# Patient Record
Sex: MALE | Race: WHITE | NOT HISPANIC OR LATINO | Employment: OTHER | URBAN - METROPOLITAN AREA
[De-identification: names, ages, dates, MRNs, and addresses within clinical notes are randomized per-mention and may not be internally consistent; named-entity substitution may affect disease eponyms.]

---

## 2018-07-17 ENCOUNTER — APPOINTMENT (OUTPATIENT)
Dept: RADIOLOGY | Facility: CLINIC | Age: 78
End: 2018-07-17
Payer: COMMERCIAL

## 2018-07-17 ENCOUNTER — OFFICE VISIT (OUTPATIENT)
Dept: URGENT CARE | Facility: CLINIC | Age: 78
End: 2018-07-17
Payer: COMMERCIAL

## 2018-07-17 VITALS
SYSTOLIC BLOOD PRESSURE: 178 MMHG | TEMPERATURE: 97.4 F | WEIGHT: 160 LBS | DIASTOLIC BLOOD PRESSURE: 88 MMHG | RESPIRATION RATE: 22 BRPM | HEART RATE: 84 BPM | OXYGEN SATURATION: 95 % | BODY MASS INDEX: 22.9 KG/M2 | HEIGHT: 70 IN

## 2018-07-17 DIAGNOSIS — S09.93XA FACIAL INJURY, INITIAL ENCOUNTER: ICD-10-CM

## 2018-07-17 DIAGNOSIS — S09.93XA FACIAL INJURY, INITIAL ENCOUNTER: Primary | ICD-10-CM

## 2018-07-17 PROCEDURE — 99203 OFFICE O/P NEW LOW 30 MIN: CPT

## 2018-07-17 PROCEDURE — 70160 X-RAY EXAM OF NASAL BONES: CPT

## 2018-07-17 PROCEDURE — S9088 SERVICES PROVIDED IN URGENT: HCPCS

## 2018-07-17 RX ORDER — PROPRANOLOL HYDROCHLORIDE 10 MG/1
10 TABLET ORAL 3 TIMES DAILY
COMMUNITY

## 2018-07-17 RX ORDER — DONEPEZIL HYDROCHLORIDE 10 MG/1
10 TABLET, FILM COATED ORAL
COMMUNITY

## 2018-07-17 RX ORDER — MULTIVITAMIN
1 CAPSULE ORAL DAILY
COMMUNITY
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
## 2021-01-20 ENCOUNTER — APPOINTMENT (EMERGENCY)
Dept: RADIOLOGY | Facility: HOSPITAL | Age: 81
End: 2021-01-20
Payer: COMMERCIAL

## 2021-01-20 ENCOUNTER — HOSPITAL ENCOUNTER (EMERGENCY)
Facility: HOSPITAL | Age: 81
Discharge: HOME | End: 2021-01-20
Attending: EMERGENCY MEDICINE | Admitting: EMERGENCY MEDICINE
Payer: COMMERCIAL

## 2021-01-20 VITALS
TEMPERATURE: 97.9 F | HEIGHT: 70 IN | WEIGHT: 169 LBS | BODY MASS INDEX: 24.2 KG/M2 | HEART RATE: 71 BPM | DIASTOLIC BLOOD PRESSURE: 75 MMHG | RESPIRATION RATE: 20 BRPM | OXYGEN SATURATION: 98 % | SYSTOLIC BLOOD PRESSURE: 137 MMHG

## 2021-01-20 DIAGNOSIS — R07.9 CHEST PAIN: Primary | ICD-10-CM

## 2021-01-20 DIAGNOSIS — J18.9 PNEUMONIA: ICD-10-CM

## 2021-01-20 DIAGNOSIS — R93.89 ABNORMAL CHEST X-RAY: ICD-10-CM

## 2021-01-20 LAB
ALBUMIN SERPL BCP-MCNC: 3.8 G/DL (ref 3.5–5)
ALP SERPL-CCNC: 105 U/L (ref 46–116)
ALT SERPL W P-5'-P-CCNC: 31 U/L (ref 12–78)
ANION GAP SERPL CALCULATED.3IONS-SCNC: 9 MMOL/L (ref 4–13)
AST SERPL W P-5'-P-CCNC: 22 U/L (ref 5–45)
BASOPHILS # BLD AUTO: 0.05 THOUSANDS/ΜL (ref 0–0.1)
BASOPHILS NFR BLD AUTO: 1 % (ref 0–1)
BILIRUB SERPL-MCNC: 0.72 MG/DL (ref 0.2–1)
BUN SERPL-MCNC: 17 MG/DL (ref 5–25)
CALCIUM SERPL-MCNC: 8.6 MG/DL (ref 8.3–10.1)
CHLORIDE SERPL-SCNC: 107 MMOL/L (ref 100–108)
CK SERPL-CCNC: 100 U/L (ref 39–308)
CO2 SERPL-SCNC: 26 MMOL/L (ref 21–32)
CREAT SERPL-MCNC: 1.04 MG/DL (ref 0.6–1.3)
EOSINOPHIL # BLD AUTO: 0.13 THOUSAND/ΜL (ref 0–0.61)
EOSINOPHIL NFR BLD AUTO: 2 % (ref 0–6)
ERYTHROCYTE [DISTWIDTH] IN BLOOD BY AUTOMATED COUNT: 13.1 % (ref 11.6–15.1)
FLUAV RNA RESP QL NAA+PROBE: NEGATIVE
FLUBV RNA RESP QL NAA+PROBE: NEGATIVE
GFR SERPL CREATININE-BSD FRML MDRD: 68 ML/MIN/1.73SQ M
GLUCOSE SERPL-MCNC: 124 MG/DL (ref 65–140)
HCT VFR BLD AUTO: 48.6 % (ref 36.5–49.3)
HGB BLD-MCNC: 15.9 G/DL (ref 12–17)
IMM GRANULOCYTES # BLD AUTO: 0.03 THOUSAND/UL (ref 0–0.2)
IMM GRANULOCYTES NFR BLD AUTO: 0 % (ref 0–2)
INR PPP: 1.06 (ref 0.84–1.19)
LIPASE SERPL-CCNC: 221 U/L (ref 73–393)
LYMPHOCYTES # BLD AUTO: 1.6 THOUSANDS/ΜL (ref 0.6–4.47)
LYMPHOCYTES NFR BLD AUTO: 19 % (ref 14–44)
MAGNESIUM SERPL-MCNC: 2 MG/DL (ref 1.6–2.6)
MCH RBC QN AUTO: 31.4 PG (ref 26.8–34.3)
MCHC RBC AUTO-ENTMCNC: 32.7 G/DL (ref 31.4–37.4)
MCV RBC AUTO: 96 FL (ref 82–98)
MONOCYTES # BLD AUTO: 0.52 THOUSAND/ΜL (ref 0.17–1.22)
MONOCYTES NFR BLD AUTO: 6 % (ref 4–12)
NEUTROPHILS # BLD AUTO: 6.09 THOUSANDS/ΜL (ref 1.85–7.62)
NEUTS SEG NFR BLD AUTO: 72 % (ref 43–75)
NRBC BLD AUTO-RTO: 0 /100 WBCS
PLATELET # BLD AUTO: 134 THOUSANDS/UL (ref 149–390)
PMV BLD AUTO: 12.4 FL (ref 8.9–12.7)
POTASSIUM SERPL-SCNC: 4 MMOL/L (ref 3.5–5.3)
PROT SERPL-MCNC: 7.3 G/DL (ref 6.4–8.2)
PROTHROMBIN TIME: 13.6 SECONDS (ref 11.6–14.5)
RBC # BLD AUTO: 5.07 MILLION/UL (ref 3.88–5.62)
RSV RNA RESP QL NAA+PROBE: NEGATIVE
SARS-COV-2 RNA RESP QL NAA+PROBE: NEGATIVE
SODIUM SERPL-SCNC: 142 MMOL/L (ref 136–145)
TROPONIN I SERPL-MCNC: <0.02 NG/ML
TROPONIN I SERPL-MCNC: <0.02 NG/ML
TSH SERPL DL<=0.05 MIU/L-ACNC: 1.93 UIU/ML (ref 0.36–3.74)
WBC # BLD AUTO: 8.42 THOUSAND/UL (ref 4.31–10.16)

## 2021-01-20 PROCEDURE — 80053 COMPREHEN METABOLIC PANEL: CPT | Performed by: EMERGENCY MEDICINE

## 2021-01-20 PROCEDURE — 36415 COLL VENOUS BLD VENIPUNCTURE: CPT | Performed by: EMERGENCY MEDICINE

## 2021-01-20 PROCEDURE — 99285 EMERGENCY DEPT VISIT HI MDM: CPT

## 2021-01-20 PROCEDURE — 0241U HB NFCT DS VIR RESP RNA 4 TRGT: CPT | Performed by: EMERGENCY MEDICINE

## 2021-01-20 PROCEDURE — 83735 ASSAY OF MAGNESIUM: CPT | Performed by: EMERGENCY MEDICINE

## 2021-01-20 PROCEDURE — 71045 X-RAY EXAM CHEST 1 VIEW: CPT

## 2021-01-20 PROCEDURE — 85610 PROTHROMBIN TIME: CPT | Performed by: EMERGENCY MEDICINE

## 2021-01-20 PROCEDURE — 85025 COMPLETE CBC W/AUTO DIFF WBC: CPT | Performed by: EMERGENCY MEDICINE

## 2021-01-20 PROCEDURE — 84443 ASSAY THYROID STIM HORMONE: CPT | Performed by: EMERGENCY MEDICINE

## 2021-01-20 PROCEDURE — 84484 ASSAY OF TROPONIN QUANT: CPT | Performed by: EMERGENCY MEDICINE

## 2021-01-20 PROCEDURE — 83690 ASSAY OF LIPASE: CPT | Performed by: EMERGENCY MEDICINE

## 2021-01-20 PROCEDURE — 96360 HYDRATION IV INFUSION INIT: CPT

## 2021-01-20 PROCEDURE — 82550 ASSAY OF CK (CPK): CPT | Performed by: EMERGENCY MEDICINE

## 2021-01-20 PROCEDURE — 99285 EMERGENCY DEPT VISIT HI MDM: CPT | Performed by: EMERGENCY MEDICINE

## 2021-01-20 RX ORDER — DOXYCYCLINE HYCLATE 100 MG/1
100 CAPSULE ORAL 2 TIMES DAILY
Qty: 14 CAPSULE | Refills: 0 | Status: SHIPPED | OUTPATIENT
Start: 2021-01-20 | End: 2021-01-27

## 2021-01-20 RX ORDER — SODIUM CHLORIDE 9 MG/ML
3 INJECTION INTRAVENOUS
Status: DISCONTINUED | OUTPATIENT
Start: 2021-01-20 | End: 2021-01-20 | Stop reason: HOSPADM

## 2021-01-20 RX ORDER — DOXYCYCLINE HYCLATE 100 MG/1
100 CAPSULE ORAL ONCE
Status: COMPLETED | OUTPATIENT
Start: 2021-01-20 | End: 2021-01-20

## 2021-01-20 RX ADMIN — SODIUM CHLORIDE 500 ML: 0.9 INJECTION, SOLUTION INTRAVENOUS at 10:39

## 2021-01-20 RX ADMIN — DOXYCYCLINE 100 MG: 100 CAPSULE ORAL at 14:21

## 2021-01-20 NOTE — PROGRESS NOTES
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

## 2021-01-20 NOTE — DISCHARGE INSTRUCTIONS
Your chest x-ray shows bilateral infiltrates concerning for COVID-19 pneumonia  Your COVID-19 test, however, was negative today  You will be started on antibiotics prophylactically to cover you for pneumonia  Please discussed return to work with your manager

## 2021-01-20 NOTE — ED PROVIDER NOTES
History  Chief Complaint   Patient presents with    Chest Pain     Pt reports sudden onset of left sided chest pain while sitting at a lunch room table, EMS gave 2 SL nitro and 324mg ASA pts chest has since resolved  77-year-old male with a past medical history of diverticulitis, BPH, hypertension, daily smoker one pack per day presents with left-sided sudden-onset chest pain that started at 8:45 a m  This morning, now resolved  Patient was at work and was sitting down  Denies previous history of chest pain and denies history of myocardial infarction, stents or aortic dissection  Patient denies trauma, fall or exertional activity  Patient arrives by EMS and received aspirin 324 mg p o  And two sublingual nitros with chest pain resolution  Patient denies diaphoresis, fever, chills, headache, neck stiffness, sore throat, cough, sputum production, nausea, vomiting, chest pain, shortness of breath, back pain, rash, abdominal pain, urinary symptoms, penile discharge, scrotal swelling, focal motor or sensory deficits, lower extremity swelling or pain, diarrhea, bloody stools or constipation  Patient has never seen a cardiologist before  No known COVID-19 exposure  Dr Conrad Crawford wore PPE during clinical evaluation due to COVID-19 pandemic including bonnet, eye goggles, face mask, gown and gloves            History provided by:  Patient and medical records   used: No    Chest Pain  Pain location:  L chest  Pain quality: pressure, sharp and tightness    Pain radiates to:  Does not radiate  Pain radiates to the back: no    Pain severity:  No pain  Onset quality:  Sudden  Duration:  10 minutes  Timing:  Constant  Progression:  Resolved  Chronicity:  New  Context: at rest    Context: not breathing, no drug use, not eating, no intercourse, not lifting, no movement, not raising an arm, no stress and no trauma    Relieved by:  Aspirin and nitroglycerin  Worsened by:  Nothing tried  Ineffective treatments:  None tried  Associated symptoms: no abdominal pain, no AICD problem, no altered mental status, no anorexia, no anxiety, no back pain, no claudication, no cough, no diaphoresis, no dizziness, no dysphagia, no fatigue, no fever, no headache, no heartburn, no lower extremity edema, no nausea, no near-syncope, no numbness, no orthopnea, no palpitations, no PND, no shortness of breath, no syncope, not vomiting and no weakness    Risk factors: hypertension, male sex and smoking    Risk factors: no aortic disease, no birth control, no coronary artery disease, no diabetes mellitus, no Nathaniel-Danlos syndrome, no high cholesterol, no immobilization, no Marfan's syndrome, not obese, not pregnant, no prior DVT/PE and no surgery        Prior to Admission Medications   Prescriptions Last Dose Informant Patient Reported? Taking? Misc Natural Products ([facility] PROSTATE HEALTH FORMULA PO)   Yes Yes   Sig: Take by mouth   Multiple Vitamin (MULTIVITAMIN) capsule   Yes Yes   Sig: Take 1 capsule by mouth daily   donepezil (ARICEPT) 10 mg tablet   Yes Yes   Sig: Take 10 mg by mouth daily at bedtime   propranolol (INDERAL) 10 mg tablet   Yes Yes   Sig: Take 10 mg by mouth 3 (three) times a day      Facility-Administered Medications: None       Past Medical History:   Diagnosis Date    BPH (benign prostatic hyperplasia)     Diverticulitis     Hypertension        History reviewed  No pertinent surgical history  History reviewed  No pertinent family history  I have reviewed and agree with the history as documented  E-Cigarette/Vaping     E-Cigarette/Vaping Substances     Social History     Tobacco Use    Smoking status: Current Every Day Smoker     Packs/day: 1 00     Types: Cigarettes    Smokeless tobacco: Never Used   Substance Use Topics    Alcohol use: No    Drug use: No       Review of Systems   Constitutional: Negative for chills, diaphoresis, fatigue and fever     HENT: Negative for congestion, drooling, facial swelling, nosebleeds, sneezing, trouble swallowing and voice change  Eyes: Negative for photophobia, pain and visual disturbance  Respiratory: Negative for cough, chest tightness, shortness of breath and wheezing  Cardiovascular: Positive for chest pain  Negative for palpitations, orthopnea, claudication, leg swelling, syncope, PND and near-syncope  Gastrointestinal: Negative for abdominal pain, anorexia, constipation, diarrhea, heartburn, nausea and vomiting  Genitourinary: Negative for decreased urine volume, difficulty urinating, discharge, dysuria, flank pain, frequency, hematuria, penile pain, penile swelling, scrotal swelling, testicular pain and urgency  Musculoskeletal: Negative for arthralgias, back pain, myalgias, neck pain and neck stiffness  Skin: Negative for color change, pallor, rash and wound  Allergic/Immunologic: Negative for immunocompromised state  Neurological: Negative for dizziness, tremors, seizures, syncope, facial asymmetry, speech difficulty, weakness, light-headedness, numbness and headaches  Hematological: Negative for adenopathy  Psychiatric/Behavioral: Negative for agitation, confusion, hallucinations and suicidal ideas  The patient is not nervous/anxious  Physical Exam  Physical Exam  Vitals signs and nursing note reviewed  Exam conducted with a chaperone present  Constitutional:       General: He is not in acute distress  Appearance: Normal appearance  He is well-developed and normal weight  He is not ill-appearing, toxic-appearing or diaphoretic  HENT:      Head: Normocephalic and atraumatic  Jaw: There is normal jaw occlusion  Right Ear: Hearing, tympanic membrane, ear canal and external ear normal  There is no impacted cerumen  No mastoid tenderness  No hemotympanum  Left Ear: Hearing, tympanic membrane, ear canal and external ear normal  There is no impacted cerumen  No mastoid tenderness  No hemotympanum        Nose: Nose normal       Right Nostril: No epistaxis  Left Nostril: No epistaxis  Right Sinus: No maxillary sinus tenderness or frontal sinus tenderness  Left Sinus: No maxillary sinus tenderness or frontal sinus tenderness  Mouth/Throat:      Lips: Pink  No lesions  Mouth: Mucous membranes are moist  No lacerations or angioedema  Tongue: No lesions  Tongue does not deviate from midline  Palate: No mass and lesions  Pharynx: Oropharynx is clear  Uvula midline  No pharyngeal swelling, oropharyngeal exudate, posterior oropharyngeal erythema or uvula swelling  Tonsils: No tonsillar exudate or tonsillar abscesses  Eyes:      General: Lids are normal  Vision grossly intact  Gaze aligned appropriately  No visual field deficit or scleral icterus  Right eye: No discharge  Left eye: No discharge  Extraocular Movements: Extraocular movements intact  Right eye: No nystagmus  Left eye: No nystagmus  Conjunctiva/sclera: Conjunctivae normal       Right eye: Right conjunctiva is not injected  Left eye: Left conjunctiva is not injected  Pupils: Pupils are equal, round, and reactive to light  Neck:      Musculoskeletal: Full passive range of motion without pain, normal range of motion and neck supple  No neck rigidity, spinous process tenderness or muscular tenderness  Thyroid: No thyroid mass or thyromegaly  Trachea: Trachea and phonation normal    Cardiovascular:      Rate and Rhythm: Normal rate and regular rhythm  Pulses: Normal pulses  Radial pulses are 2+ on the right side and 2+ on the left side  Dorsalis pedis pulses are 2+ on the right side and 2+ on the left side  Heart sounds: Normal heart sounds, S1 normal and S2 normal    Pulmonary:      Effort: Pulmonary effort is normal  No tachypnea, accessory muscle usage, respiratory distress or retractions        Breath sounds: Normal breath sounds and air entry  No stridor or decreased air movement  No decreased breath sounds, wheezing, rhonchi or rales  Chest:      Chest wall: No tenderness  Abdominal:      General: Abdomen is flat  Bowel sounds are normal  There is no distension  Palpations: Abdomen is soft  Abdomen is not rigid  There is no hepatomegaly, splenomegaly or mass  Tenderness: There is no abdominal tenderness  There is no right CVA tenderness, left CVA tenderness, guarding or rebound  Negative signs include Aguirre's sign and McBurney's sign  Hernia: No hernia is present  Genitourinary:     Penis: Normal        Scrotum/Testes: Normal    Musculoskeletal: Normal range of motion  General: No swelling, tenderness, deformity or signs of injury  Right lower leg: He exhibits no tenderness  No edema  Left lower leg: He exhibits no tenderness  No edema  Lymphadenopathy:      Cervical: No cervical adenopathy  Skin:     General: Skin is warm and dry  Capillary Refill: Capillary refill takes less than 2 seconds  Coloration: Skin is not ashen, cyanotic, jaundiced, mottled, pale or sallow  Findings: No abrasion, abscess, acne, bruising, burn, ecchymosis, erythema, signs of injury, laceration, lesion, petechiae, rash or wound  Rash is not macular or papular  Nails: There is no clubbing  Neurological:      General: No focal deficit present  Mental Status: He is alert and oriented to person, place, and time  Mental status is at baseline  He is not disoriented  GCS: GCS eye subscore is 4  GCS verbal subscore is 5  GCS motor subscore is 6  Cranial Nerves: Cranial nerves are intact  No cranial nerve deficit, dysarthria or facial asymmetry  Sensory: Sensation is intact  No sensory deficit  Motor: Motor function is intact  No weakness, tremor, atrophy, abnormal muscle tone or seizure activity  Coordination: Coordination is intact  Coordination normal       Gait: Gait is intact  Gait normal       Comments: Patient is AAOx4, GCS 15; speaking clearly and appropriately; motor and sensation intact; visual fields intact; cranial nerves II-XII grossly intact; no facial droop, slurred speech or arm drift   Psychiatric:         Attention and Perception: Attention and perception normal  He is attentive  Mood and Affect: Mood and affect normal          Speech: Speech normal          Behavior: Behavior normal  Behavior is cooperative  Thought Content:  Thought content normal          Cognition and Memory: Cognition and memory normal          Judgment: Judgment normal          Vital Signs  ED Triage Vitals [01/20/21 1024]   Temperature Pulse Respirations Blood Pressure SpO2   97 9 °F (36 6 °C) 84 20 (!) 180/92 97 %      Temp Source Heart Rate Source Patient Position - Orthostatic VS BP Location FiO2 (%)   Temporal Monitor Lying Left arm --      Pain Score       No Pain           Vitals:    01/20/21 1130 01/20/21 1330 01/20/21 1337 01/20/21 1449   BP: 150/82 159/87 151/81 137/75   Pulse: 75 68 66 71   Patient Position - Orthostatic VS: Lying Sitting Lying Lying         Visual Acuity      ED Medications  Medications   sodium chloride (PF) 0 9 % injection 3 mL (has no administration in time range)   sodium chloride 0 9 % bolus 500 mL (0 mL Intravenous Stopped 1/20/21 1121)   doxycycline hyclate (VIBRAMYCIN) capsule 100 mg (100 mg Oral Given 1/20/21 1421)       Diagnostic Studies  Results Reviewed     Procedure Component Value Units Date/Time    Troponin I repeat in 3hrs [[id number]]  (Normal) Collected: 01/20/21 1330    Lab Status: Final result Specimen: Blood from Arm, Left Updated: 01/20/21 1351     Troponin I <0 02 ng/mL     COVID19, Influenza A/B, RSV PCR, [facility] [[id number]]  (Normal) Collected: 01/20/21 1123    Lab Status: Final result Specimen: Nares from Nasopharyngeal Swab Updated: 01/20/21 1210     SARS-CoV-2 Negative     INFLUENZA A PCR Negative     INFLUENZA B PCR Negative     RSV PCR Negative    Narrative: This test has been authorized by FDA under an EUA (Emergency Use Assay) for use by authorized laboratories  Clinical caution and judgement should be used with the interpretation of these results with consideration of the clinical impression and other laboratory testing  Testing reported as "Positive" or "Negative" has been proven to be accurate according to standard laboratory validation requirements  All testing is performed with control materials showing appropriate reactivity at standard intervals      Comprehensive metabolic panel [[id number]] Collected: 01/20/21 1038    Lab Status: Final result Specimen: Blood from Arm, Left Updated: 01/20/21 1112     Sodium 142 mmol/L      Potassium 4 0 mmol/L      Chloride 107 mmol/L      CO2 26 mmol/L      ANION GAP 9 mmol/L      BUN 17 mg/dL      Creatinine 1 04 mg/dL      Glucose 124 mg/dL      Calcium 8 6 mg/dL      AST 22 U/L      ALT 31 U/L      Alkaline Phosphatase 105 U/L      Total Protein 7 3 g/dL      Albumin 3 8 g/dL      Total Bilirubin 0 72 mg/dL      eGFR 68 ml/min/1 73sq m     Narrative:      [place] guidelines for Chronic Kidney Disease (CKD):     Stage 1 with normal or high GFR (GFR > 90 mL/min/1 73 square meters)    Stage 2 Mild CKD (GFR = 60-89 mL/min/1 73 square meters)    Stage 3A Moderate CKD (GFR = 45-59 mL/min/1 73 square meters)    Stage 3B Moderate CKD (GFR = 30-44 mL/min/1 73 square meters)    Stage 4 Severe CKD (GFR = 15-29 mL/min/1 73 square meters)    Stage 5 End Stage CKD (GFR <15 mL/min/1 73 square meters)  Note: GFR calculation is accurate only with a steady state creatinine    Lipase [[id number]]  (Normal) Collected: 01/20/21 1038    Lab Status: Final result Specimen: Blood from Arm, Left Updated: 01/20/21 1112     Lipase 221 u/L     Magnesium [[id number]]  (Normal) Collected: 01/20/21 1038    Lab Status: Final result Specimen: Blood from Arm, Left Updated: 01/20/21 1112 Magnesium 2 0 mg/dL     TSH, 3rd generation [[id number]]  (Normal) Collected: 01/20/21 1038    Lab Status: Final result Specimen: Blood from Arm, Left Updated: 01/20/21 1112     TSH 3RD GENERATON 1 931 uIU/mL     Narrative:      Patients undergoing fluorescein dye angiography may retain small amounts of fluorescein in the body for 48-72 hours post procedure  Samples containing fluorescein can produce falsely depressed TSH values  If the patient had this procedure,a specimen should be resubmitted post fluorescein clearance        CK (with reflex to MB) [[id number]]  (Normal) Collected: 01/20/21 1038    Lab Status: Final result Specimen: Blood from Arm, Left Updated: 01/20/21 1112     Total  U/L     Troponin I [[id number]]  (Normal) Collected: 01/20/21 1038    Lab Status: Final result Specimen: Blood from Arm, Left Updated: 01/20/21 1108     Troponin I <0 02 ng/mL     Protime-INR [[id number]]  (Normal) Collected: 01/20/21 1038    Lab Status: Final result Specimen: Blood from Arm, Left Updated: 01/20/21 1058     Protime 13 6 seconds      INR 1 06    CBC and differential [[id number]]  (Abnormal) Collected: 01/20/21 1038    Lab Status: Final result Specimen: Blood from Arm, Left Updated: 01/20/21 1045     WBC 8 42 Thousand/uL      RBC 5 07 Million/uL      Hemoglobin 15 9 g/dL      Hematocrit 48 6 %      MCV 96 fL      MCH 31 4 pg      MCHC 32 7 g/dL      RDW 13 1 %      MPV 12 4 fL      Platelets 919 Thousands/uL      nRBC 0 /100 WBCs      Neutrophils Relative 72 %      Immat GRANS % 0 %      Lymphocytes Relative 19 %      Monocytes Relative 6 %      Eosinophils Relative 2 %      Basophils Relative 1 %      Neutrophils Absolute 6 09 Thousands/µL      Immature Grans Absolute 0 03 Thousand/uL      Lymphocytes Absolute 1 60 Thousands/µL      Monocytes Absolute 0 52 Thousand/µL      Eosinophils Absolute 0 13 Thousand/µL      Basophils Absolute 0 05 Thousands/µL                  X-ray chest 1 view portable   Final Result by Chris Torre MD (01/20 1108)      Diffuse bilateral groundglass opacities suggesting possible Covid pneumonia      Findings personally discussed by phone with Dr Juana Castro at 11:10 AM, 1/20/2021            Workstation performed: [id number]                    Procedures  ECG 12 Lead Documentation Only    Date/Time: 1/20/2021 10:23 AM  Performed by: Petr Dorantes MD  Authorized by: Petr Dorantes MD     Indications / Diagnosis:  Cp  Patient location:  ED  Previous ECG:     Comparison to cardiac monitor: Yes    Interpretation:     Interpretation: normal    Rate:     ECG rate:  85bpm    ECG rate assessment: normal    Rhythm:     Rhythm: sinus rhythm    Ectopy:     Ectopy: none    QRS:     QRS axis:  Left  Conduction:     Conduction: normal    ST segments:     ST segments:  Abnormal    Depression:  V6, V5, V4, I and II  T waves:     T waves: flattening and inverted      Flattening:  AVL and V1    Inverted:  AVR and V1  Comments:      No STEMI  FL 126ms  QRS 102ms  QT 454ms             ED Course  ED Course as of Jan 20 1450   Wed Jan 20, 2021   1124 CXR:IMPRESSION:     Diffuse bilateral groundglass opacities suggesting possible Covid pneumonia     Findings personally discussed by phone with Dr Juana Castro at 11:10 AM, 1/20/2021         1125 HEART score 5      1125 Texted Dr Trever Angel, Hospitalist for MS observation status for rule out ACS  02 [phone] 46 test added due to CXR findings, patient denies cough, shortness of breath or fever  1130 Will get delta troponin, per Dr Trever Angel  If trending upwards, then patient may require transfer for cardiac cath  [facility] patient  He has no chest pain at this time  Reviewed labs and imaging and plan for delta troponin at 1:30 p m       1222 Dr Trever Angel has evaluated patient in the ED   If delta troponin is normal, then discharge with outpatient follow-up with Cardiology for stress test       1325 COVID negative      1333 CXR:IMPRESSION:     Diffuse bilateral groundglass opacities suggesting possible Covid pneumonia     Findings personally discussed by phone with Dr Anamika Villegas at 11:10 AM, 1/20/2021         22 [id number] Reassess patient  He has no chest pain  Discussed chest x-ray findings patient denies shortness of breath or cough  No known COVID exposures  Will start patient on doxycycline to cover him as he is a heavy smoker  1432 Delta troponin within normal limits  Reassessed patient  He has no complaints at this time  Reviewed labs and imaging  Plan for discharge with primary care provider follow-up and cardiology referral in the next 72 hours for outpatient evaluation and possible stress test   Work note provided  Wife at bedside will drive patient home  Strict return to ER precautions discussed with and acknowledged by patient                  MDM  Number of Diagnoses or Management Options  Chest pain:      Amount and/or Complexity of Data Reviewed  Clinical lab tests: ordered and reviewed  Tests in the radiology section of CPT®: reviewed and ordered  Tests in the medicine section of CPT®: reviewed and ordered  Review and summarize past medical records: yes  Discuss the patient with other providers: yes (Hospitalist, Dr Moo Christie)  Independent visualization of images, tracings, or specimens: yes (Chest x-ray, EKG)    Risk of Complications, Morbidity, and/or Mortality  Presenting problems: moderate  Diagnostic procedures: moderate  Management options: moderate    Patient Progress  Patient progress: stable      Disposition  Final diagnoses:   Chest pain   Abnormal chest x-ray   Pneumonia     Time reflects when diagnosis was documented in both MDM as applicable and the Disposition within this note     Time User Action Codes Description Comment    1/20/2021 12:03 PM Rachel Packer Add [R07 [id number]] Chest pain     1/20/2021  2:33 PM Rachel Packer Add [R93 [id number]] Abnormal chest x-ray     1/20/2021  2:35 PM Rachel Smith [J18 [id number]] Pneumonia ED Disposition     ED Disposition Condition Date/Time Comment    Discharge Stable Wed Jan 20, 2021  2:33 PM [id number] discharge to home/self care  Follow-up Information     Follow up With Specialties Details Why Contact Info    Priyank Suh MD Internal Medicine Call in 1 day Please follow-up with your primary care provider for suspected COVID pneumonia  [address] [facility]      Sweta Overton MD Cardiology Call in 3 days Please follow-up with cardiology within the next 3 days for outpatient evaluation and possible stress test  Eloy Bedoya [address] [place], [place] [address] [phone]            Patient's Medications   Discharge Prescriptions    DOXYCYCLINE HYCLATE (VIBRAMYCIN) 100 MG CAPSULE    Take 1 capsule (100 mg total) by mouth 2 (two) times a day for 7 days       Start Date: 1/20/2021 End Date: 1/27/2021       Order Dose: 100 mg       Quantity: 14 capsule    Refills: 0     No discharge procedures on file      PDMP Review     None          ED Provider  Electronically Signed by    MD Jamil Gonsales MD  01/20/21 5332

## 2021-01-20 NOTE — Clinical Note
Shruthi Barrazas was seen and treated in our emergency department on 1/20/2021  Other - See Comments        Diagnosis:     Anitha Sun  may return to work on return date  He may return on this date:     Please discuss with your manager when you can return to work  If you have any questions or concerns, please don't hesitate to call        Erika Moody MD    ______________________________           _______________          _______________  Hospital Representative                              Date                                Time

## 2021-10-19 ENCOUNTER — OFFICE VISIT (OUTPATIENT)
Dept: URGENT CARE | Facility: CLINIC | Age: 81
End: 2021-10-19
Payer: COMMERCIAL

## 2021-10-19 VITALS
OXYGEN SATURATION: 92 % | HEIGHT: 69 IN | TEMPERATURE: 100.4 F | BODY MASS INDEX: 25.18 KG/M2 | RESPIRATION RATE: 20 BRPM | HEART RATE: 112 BPM | WEIGHT: 170 LBS

## 2021-10-19 DIAGNOSIS — R05.9 COUGH: ICD-10-CM

## 2021-10-19 DIAGNOSIS — R09.02 HYPOXIA: Primary | ICD-10-CM

## 2021-10-19 DIAGNOSIS — R50.9 FEVER, UNSPECIFIED FEVER CAUSE: ICD-10-CM

## 2021-10-19 LAB
SL AMB  POCT GLUCOSE, UA: ABNORMAL
SL AMB LEUKOCYTE ESTERASE,UA: ABNORMAL
SL AMB POCT BILIRUBIN,UA: ABNORMAL
SL AMB POCT BLOOD,UA: ABNORMAL
SL AMB POCT CLARITY,UA: CLEAR
SL AMB POCT COLOR,UA: YELLOW
SL AMB POCT KETONES,UA: ABNORMAL
SL AMB POCT NITRITE,UA: ABNORMAL
SL AMB POCT PH,UA: 6.5
SL AMB POCT SPECIFIC GRAVITY,UA: 1.01
SL AMB POCT URINE PROTEIN: ABNORMAL
SL AMB POCT UROBILINOGEN: ABNORMAL

## 2021-10-19 PROCEDURE — 81002 URINALYSIS NONAUTO W/O SCOPE: CPT | Performed by: PHYSICIAN ASSISTANT

## 2021-10-19 PROCEDURE — 99213 OFFICE O/P EST LOW 20 MIN: CPT | Performed by: PHYSICIAN ASSISTANT

## 2021-10-19 PROCEDURE — S9083 URGENT CARE CENTER GLOBAL: HCPCS | Performed by: PHYSICIAN ASSISTANT

## 2021-10-19 RX ORDER — AMLODIPINE BESYLATE 5 MG/1
TABLET ORAL
COMMUNITY
Start: 2021-10-04

## 2021-10-19 RX ORDER — DEXTROMETHORPHAN HYDROBROMIDE AND PROMETHAZINE HYDROCHLORIDE 15; 6.25 MG/5ML; MG/5ML
5 SYRUP ORAL 4 TIMES DAILY PRN
COMMUNITY
Start: 2021-10-19

## 2021-10-19 RX ORDER — LOSARTAN POTASSIUM 50 MG/1
TABLET ORAL
COMMUNITY
Start: 2021-10-04

## 2021-10-19 RX ORDER — TAMSULOSIN HYDROCHLORIDE 0.4 MG/1
CAPSULE ORAL
COMMUNITY
Start: 2021-10-04

## 2021-10-19 RX ORDER — MEMANTINE HYDROCHLORIDE 10 MG/1
TABLET ORAL
COMMUNITY
Start: 2021-08-16

## 2024-05-13 ENCOUNTER — HOSPITAL ENCOUNTER (INPATIENT)
Facility: HOSPITAL | Age: 84
LOS: 1 days | Discharge: HOME | DRG: 067 | End: 2024-05-14
Attending: EMERGENCY MEDICINE | Admitting: INTERNAL MEDICINE
Payer: COMMERCIAL

## 2024-05-13 ENCOUNTER — APPOINTMENT (INPATIENT)
Dept: MRI IMAGING | Facility: HOSPITAL | Age: 84
DRG: 067 | End: 2024-05-13
Payer: COMMERCIAL

## 2024-05-13 ENCOUNTER — APPOINTMENT (INPATIENT)
Dept: NON INVASIVE DIAGNOSTICS | Facility: HOSPITAL | Age: 84
DRG: 067 | End: 2024-05-13
Payer: COMMERCIAL

## 2024-05-13 ENCOUNTER — APPOINTMENT (EMERGENCY)
Dept: CT IMAGING | Facility: HOSPITAL | Age: 84
DRG: 067 | End: 2024-05-13
Payer: COMMERCIAL

## 2024-05-13 DIAGNOSIS — R42 DIZZINESS: Primary | ICD-10-CM

## 2024-05-13 DIAGNOSIS — I63.9 STROKE (HCC): ICD-10-CM

## 2024-05-13 DIAGNOSIS — I10 UNCONTROLLED HYPERTENSION: ICD-10-CM

## 2024-05-13 DIAGNOSIS — I63.521 ACUTE ISCHEMIC MULTIFOCAL RIGHT-SIDED ANTERIOR CIRCULATION STROKE (HCC): ICD-10-CM

## 2024-05-13 PROBLEM — R29.90 STROKE-LIKE SYMPTOMS: Status: ACTIVE | Noted: 2024-05-13

## 2024-05-13 PROBLEM — F03.90 DEMENTIA (HCC): Status: ACTIVE | Noted: 2024-05-13

## 2024-05-13 PROBLEM — N40.0 BPH (BENIGN PROSTATIC HYPERPLASIA): Status: ACTIVE | Noted: 2024-05-13

## 2024-05-13 LAB
2HR DELTA HS TROPONIN: 0 NG/L
4HR DELTA HS TROPONIN: 0 NG/L
ALBUMIN SERPL BCP-MCNC: 4.3 G/DL (ref 3.5–5)
ALP SERPL-CCNC: 102 U/L (ref 34–104)
ALT SERPL W P-5'-P-CCNC: 17 U/L (ref 7–52)
ANION GAP SERPL CALCULATED.3IONS-SCNC: 6 MMOL/L (ref 4–13)
AORTIC ROOT: 3.3 CM
APICAL FOUR CHAMBER EJECTION FRACTION: 60 %
APTT PPP: 35 SECONDS (ref 23–37)
AST SERPL W P-5'-P-CCNC: 16 U/L (ref 13–39)
BASOPHILS # BLD AUTO: 0.05 THOUSANDS/ÂΜL (ref 0–0.1)
BASOPHILS NFR BLD AUTO: 1 % (ref 0–1)
BILIRUB SERPL-MCNC: 0.68 MG/DL (ref 0.2–1)
BSA FOR ECHO PROCEDURE: 1.93 M2
BUN SERPL-MCNC: 15 MG/DL (ref 5–25)
CALCIUM SERPL-MCNC: 9.1 MG/DL (ref 8.4–10.2)
CARDIAC TROPONIN I PNL SERPL HS: 4 NG/L
CHLORIDE SERPL-SCNC: 110 MMOL/L (ref 96–108)
CO2 SERPL-SCNC: 26 MMOL/L (ref 21–32)
CREAT SERPL-MCNC: 1.06 MG/DL (ref 0.6–1.3)
E WAVE DECELERATION TIME: 255 MS
E/A RATIO: 0.54
EOSINOPHIL # BLD AUTO: 0.24 THOUSAND/ÂΜL (ref 0–0.61)
EOSINOPHIL NFR BLD AUTO: 2 % (ref 0–6)
ERYTHROCYTE [DISTWIDTH] IN BLOOD BY AUTOMATED COUNT: 13.3 % (ref 11.6–15.1)
FRACTIONAL SHORTENING: 22 (ref 28–44)
GFR SERPL CREATININE-BSD FRML MDRD: 65 ML/MIN/1.73SQ M
GLUCOSE SERPL-MCNC: 142 MG/DL (ref 65–140)
GLUCOSE SERPL-MCNC: 144 MG/DL (ref 65–140)
HCT VFR BLD AUTO: 46.6 % (ref 36.5–49.3)
HGB BLD-MCNC: 15.3 G/DL (ref 12–17)
IMM GRANULOCYTES # BLD AUTO: 0.06 THOUSAND/UL (ref 0–0.2)
IMM GRANULOCYTES NFR BLD AUTO: 1 % (ref 0–2)
INR PPP: 0.98 (ref 0.84–1.19)
INTERVENTRICULAR SEPTUM IN DIASTOLE (PARASTERNAL SHORT AXIS VIEW): 1.1 CM
INTERVENTRICULAR SEPTUM: 1.1 CM (ref 0.6–1.1)
LAAS-AP2: 14.9 CM2
LAAS-AP4: 12.9 CM2
LEFT ATRIUM SIZE: 3.4 CM
LEFT ATRIUM VOLUME (MOD BIPLANE): 32 ML
LEFT ATRIUM VOLUME INDEX (MOD BIPLANE): 16.6 ML/M2
LEFT INTERNAL DIMENSION IN SYSTOLE: 3.2 CM (ref 2.1–4)
LEFT VENTRICLE DIASTOLIC VOLUME (MOD BIPLANE): 53 ML
LEFT VENTRICLE DIASTOLIC VOLUME INDEX (MOD BIPLANE): 27.5 ML/M2
LEFT VENTRICLE SYSTOLIC VOLUME (MOD BIPLANE): 21 ML
LEFT VENTRICLE SYSTOLIC VOLUME INDEX (MOD BIPLANE): 10.9 ML/M2
LEFT VENTRICULAR INTERNAL DIMENSION IN DIASTOLE: 4.1 CM (ref 3.5–6)
LEFT VENTRICULAR POSTERIOR WALL IN END DIASTOLE: 1.2 CM
LEFT VENTRICULAR STROKE VOLUME: 36 ML
LV EF: 60 %
LVSV (TEICH): 36 ML
LYMPHOCYTES # BLD AUTO: 2.09 THOUSANDS/ÂΜL (ref 0.6–4.47)
LYMPHOCYTES NFR BLD AUTO: 21 % (ref 14–44)
MCH RBC QN AUTO: 31.1 PG (ref 26.8–34.3)
MCHC RBC AUTO-ENTMCNC: 32.8 G/DL (ref 31.4–37.4)
MCV RBC AUTO: 95 FL (ref 82–98)
MONOCYTES # BLD AUTO: 0.66 THOUSAND/ÂΜL (ref 0.17–1.22)
MONOCYTES NFR BLD AUTO: 7 % (ref 4–12)
MV E'TISSUE VEL-LAT: 6 CM/S
MV E'TISSUE VEL-SEP: 6 CM/S
MV PEAK A VEL: 0.89 M/S
MV PEAK E VEL: 48 CM/S
MV STENOSIS PRESSURE HALF TIME: 74 MS
MV VALVE AREA P 1/2 METHOD: 2.97
NEUTROPHILS # BLD AUTO: 6.84 THOUSANDS/ÂΜL (ref 1.85–7.62)
NEUTS SEG NFR BLD AUTO: 68 % (ref 43–75)
NRBC BLD AUTO-RTO: 0 /100 WBCS
PLATELET # BLD AUTO: 143 THOUSANDS/UL (ref 149–390)
PMV BLD AUTO: 12.2 FL (ref 8.9–12.7)
POTASSIUM SERPL-SCNC: 3.9 MMOL/L (ref 3.5–5.3)
PROT SERPL-MCNC: 7.3 G/DL (ref 6.4–8.4)
PROTHROMBIN TIME: 13.2 SECONDS (ref 11.6–14.5)
RA PRESSURE ESTIMATED: 8 MMHG
RBC # BLD AUTO: 4.92 MILLION/UL (ref 3.88–5.62)
RIGHT ATRIUM AREA SYSTOLE A4C: 12.7 CM2
RIGHT VENTRICLE ID DIMENSION: 3.8 CM
SL CV LEFT ATRIUM LENGTH A2C: 4.9 CM
SL CV LV EF: 60
SL CV PED ECHO LEFT VENTRICLE DIASTOLIC VOLUME (MOD BIPLANE) 2D: 76 ML
SL CV PED ECHO LEFT VENTRICLE SYSTOLIC VOLUME (MOD BIPLANE) 2D: 40 ML
SODIUM SERPL-SCNC: 142 MMOL/L (ref 135–147)
TRICUSPID ANNULAR PLANE SYSTOLIC EXCURSION: 2 CM
WBC # BLD AUTO: 9.94 THOUSAND/UL (ref 4.31–10.16)

## 2024-05-13 PROCEDURE — 70551 MRI BRAIN STEM W/O DYE: CPT

## 2024-05-13 PROCEDURE — 85730 THROMBOPLASTIN TIME PARTIAL: CPT | Performed by: EMERGENCY MEDICINE

## 2024-05-13 PROCEDURE — 80053 COMPREHEN METABOLIC PANEL: CPT | Performed by: EMERGENCY MEDICINE

## 2024-05-13 PROCEDURE — 82948 REAGENT STRIP/BLOOD GLUCOSE: CPT

## 2024-05-13 PROCEDURE — 99285 EMERGENCY DEPT VISIT HI MDM: CPT | Performed by: EMERGENCY MEDICINE

## 2024-05-13 PROCEDURE — 84484 ASSAY OF TROPONIN QUANT: CPT | Performed by: EMERGENCY MEDICINE

## 2024-05-13 PROCEDURE — 99223 1ST HOSP IP/OBS HIGH 75: CPT | Performed by: PSYCHIATRY & NEUROLOGY

## 2024-05-13 PROCEDURE — 70496 CT ANGIOGRAPHY HEAD: CPT

## 2024-05-13 PROCEDURE — 36415 COLL VENOUS BLD VENIPUNCTURE: CPT

## 2024-05-13 PROCEDURE — 99223 1ST HOSP IP/OBS HIGH 75: CPT | Performed by: INTERNAL MEDICINE

## 2024-05-13 PROCEDURE — 70498 CT ANGIOGRAPHY NECK: CPT

## 2024-05-13 PROCEDURE — 93306 TTE W/DOPPLER COMPLETE: CPT

## 2024-05-13 PROCEDURE — 85610 PROTHROMBIN TIME: CPT | Performed by: EMERGENCY MEDICINE

## 2024-05-13 PROCEDURE — 93005 ELECTROCARDIOGRAM TRACING: CPT

## 2024-05-13 PROCEDURE — 85025 COMPLETE CBC W/AUTO DIFF WBC: CPT | Performed by: EMERGENCY MEDICINE

## 2024-05-13 PROCEDURE — 99285 EMERGENCY DEPT VISIT HI MDM: CPT

## 2024-05-13 RX ORDER — CLOPIDOGREL BISULFATE 75 MG/1
75 TABLET ORAL DAILY
Status: DISCONTINUED | OUTPATIENT
Start: 2024-05-14 | End: 2024-05-14 | Stop reason: HOSPADM

## 2024-05-13 RX ORDER — HEPARIN SODIUM 5000 [USP'U]/ML
5000 INJECTION, SOLUTION INTRAVENOUS; SUBCUTANEOUS EVERY 8 HOURS SCHEDULED
Status: DISCONTINUED | OUTPATIENT
Start: 2024-05-14 | End: 2024-05-14 | Stop reason: HOSPADM

## 2024-05-13 RX ORDER — ACETAMINOPHEN 325 MG/1
650 TABLET ORAL EVERY 4 HOURS PRN
Status: DISCONTINUED | OUTPATIENT
Start: 2024-05-13 | End: 2024-05-14 | Stop reason: HOSPADM

## 2024-05-13 RX ORDER — MEMANTINE HYDROCHLORIDE 10 MG/1
10 TABLET ORAL DAILY
Status: DISCONTINUED | OUTPATIENT
Start: 2024-05-13 | End: 2024-05-14 | Stop reason: HOSPADM

## 2024-05-13 RX ORDER — HYDRALAZINE HYDROCHLORIDE 20 MG/ML
20 INJECTION INTRAMUSCULAR; INTRAVENOUS ONCE
Status: DISCONTINUED | OUTPATIENT
Start: 2024-05-13 | End: 2024-05-14 | Stop reason: HOSPADM

## 2024-05-13 RX ORDER — ASPIRIN 81 MG/1
81 TABLET, CHEWABLE ORAL DAILY
Status: DISCONTINUED | OUTPATIENT
Start: 2024-05-13 | End: 2024-05-14 | Stop reason: HOSPADM

## 2024-05-13 RX ORDER — HYDRALAZINE HYDROCHLORIDE 20 MG/ML
5 INJECTION INTRAMUSCULAR; INTRAVENOUS EVERY 6 HOURS PRN
Status: DISCONTINUED | OUTPATIENT
Start: 2024-05-13 | End: 2024-05-14 | Stop reason: HOSPADM

## 2024-05-13 RX ORDER — TAMSULOSIN HYDROCHLORIDE 0.4 MG/1
0.4 CAPSULE ORAL
Status: DISCONTINUED | OUTPATIENT
Start: 2024-05-13 | End: 2024-05-14 | Stop reason: HOSPADM

## 2024-05-13 RX ORDER — ATORVASTATIN CALCIUM 40 MG/1
40 TABLET, FILM COATED ORAL EVERY EVENING
Status: DISCONTINUED | OUTPATIENT
Start: 2024-05-13 | End: 2024-05-14 | Stop reason: HOSPADM

## 2024-05-13 RX ORDER — CLOPIDOGREL BISULFATE 75 MG/1
75 TABLET ORAL DAILY
Status: DISCONTINUED | OUTPATIENT
Start: 2024-05-13 | End: 2024-05-13

## 2024-05-13 RX ORDER — SILDENAFIL 25 MG/1
25 TABLET, FILM COATED ORAL DAILY PRN
COMMUNITY

## 2024-05-13 RX ORDER — CLOPIDOGREL BISULFATE 75 MG/1
300 TABLET ORAL ONCE
Status: COMPLETED | OUTPATIENT
Start: 2024-05-13 | End: 2024-05-13

## 2024-05-13 RX ORDER — LORAZEPAM 2 MG/ML
1 INJECTION INTRAMUSCULAR EVERY 6 HOURS PRN
Status: DISCONTINUED | OUTPATIENT
Start: 2024-05-13 | End: 2024-05-14 | Stop reason: HOSPADM

## 2024-05-13 RX ORDER — NICOTINE 21 MG/24HR
1 PATCH, TRANSDERMAL 24 HOURS TRANSDERMAL DAILY
Status: DISCONTINUED | OUTPATIENT
Start: 2024-05-13 | End: 2024-05-14 | Stop reason: HOSPADM

## 2024-05-13 RX ORDER — ASPIRIN 81 MG/1
81 TABLET ORAL DAILY
COMMUNITY
End: 2024-05-14

## 2024-05-13 RX ORDER — DONEPEZIL HYDROCHLORIDE 5 MG/1
10 TABLET, FILM COATED ORAL
Status: DISCONTINUED | OUTPATIENT
Start: 2024-05-13 | End: 2024-05-14 | Stop reason: HOSPADM

## 2024-05-13 RX ADMIN — MEMANTINE 10 MG: 10 TABLET ORAL at 15:50

## 2024-05-13 RX ADMIN — ASPIRIN 81 MG 81 MG: 81 TABLET ORAL at 15:50

## 2024-05-13 RX ADMIN — ATORVASTATIN CALCIUM 40 MG: 40 TABLET, FILM COATED ORAL at 17:24

## 2024-05-13 RX ADMIN — CLOPIDOGREL 300 MG: 75 TABLET ORAL at 15:49

## 2024-05-13 RX ADMIN — IOHEXOL 85 ML: 350 INJECTION, SOLUTION INTRAVENOUS at 12:11

## 2024-05-13 RX ADMIN — TAMSULOSIN HYDROCHLORIDE 0.4 MG: 0.4 CAPSULE ORAL at 15:50

## 2024-05-13 RX ADMIN — DONEPEZIL HYDROCHLORIDE 10 MG: 5 TABLET ORAL at 21:48

## 2024-05-13 RX ADMIN — NICOTINE 1 PATCH: 14 PATCH, EXTENDED RELEASE TRANSDERMAL at 15:49

## 2024-05-13 RX ADMIN — LORAZEPAM 1 MG: 2 INJECTION INTRAMUSCULAR; INTRAVENOUS at 13:26

## 2024-05-13 NOTE — ED NOTES
Patient's family member updated that patient got admitted and given patient's room number per request.      Martha Skinner RN  05/13/24 [id number]

## 2024-05-13 NOTE — TELEMEDICINE
TeleConsultation - Neurology   Joe Aviles 83 y.o. male MRN: [id number]  Unit/Bed#: -01 Encounter: [id number]    REQUIRED DOCUMENTATION:     1. This service was provided via Telemedicine.  2. Provider located at [facility].  3. TeleMed provider: Lei Guerrier DO.  4. Identify all parties in room with patient during tele consult:  None  5.Patient was then informed that this was a Telemedicine visit and that the exam was being conducted confidentially over secure lines. My office door was closed. No one else was in the room.  Patient acknowledged consent and understanding of privacy and security of the Telemedicine visit, and gave us permission to have the assistant stay in the room in order to assist with the history and to conduct the exam.  I informed the patient that I have reviewed their record in Epic and presented the opportunity for them to ask any questions regarding the visit today.  The patient agreed to participate.     Assessment/Plan     * Acute ischemic multifocal right-sided anterior circulation stroke (HCC)  Assessment & Plan  84 y/o M with HTN and dementia that presents with transient left arm numbness, left arm weakness, generalized weakness in legs, and gait/balance dysfunction in the setting of multiple R hemispheric infarcts and severe b/l proximal ICA stenosis. Suspect symptomatic R carotid disease causing R MCA territory infarcts.    -continue neurochecks; notify with changes  -HCT reviewed - age-indeterminate infarcts in R BG and occipital lobe  -CTA reviewed - severe b/l ICA stenosis proximally; R vert hypoplastic and terminates or occludes intracranially  -MRI brain reviewed - multiple acute infarcts throughout R MCA territory  -obtain TTE  -telemetry  -recommend CUS and Vascular Surgery consultation; ideally would have carotid revascularization within 1-2 weeks if deemed a surgical candidate  -despite the L ICA being asymptomatic at this time, given the severity of stenosis it  would be reasonable to consider revascularization of the L ICA at a later date  -continue home ASA; loading with Plavix 300mg now then 75mg daily for DAPT  -initiate Atorvastatin 40mg daily  -no need for permissive HTN at this point; goal normotension  -check lipid panel/A1c  -PT/OT  -will follow; call with questions      Joe Aviles will need follow up in in 6 weeks with neurovascular attending or advance practitioner. He will not require outpatient neurological testing.      Reason for Consult / Principal Problem: stroke  Hx and PE limited by: N/A    HPI: Joe Aviles is a 83 y.o. male with HTN and dementia who presents with generalized weakness, dizziness, and gait dysfunction. Pt states that 4 days ago he noted numbness in his left arm and went to [facility], where a CTA was performed and revealed age-indeterminate strokes on the R. He was recommended for admission but left AMA. Since that time numbness improved and resolved but he overall began to feel more weak and was having difficulty with balance, which prompted him to come back to the hospital. CTA head/neck here again confirmed age-indeterminate strokes on the R along with severe stenosis of both proximal ICAs. He was subsequently admitted and had an MRI brain done today that confirmed multiple acute infarcts on the R along with chronic infarcts.    Pt himself states he feels generally weak but moreso in the left arm and both legs. Denies any sensory change, speech change, or visual change.    Inpatient consult to Neurology  Consult performed by: Lei Guerrier DO  Consult ordered by: Gordon Gallardo MD         Review of Systems   Musculoskeletal:  Positive for gait problem.   Neurological:  Positive for dizziness, weakness and numbness.   All other systems reviewed and are negative.      Historical Information   Past Medical History:   Diagnosis Date    BPH (benign prostatic hyperplasia)     Dementia (HCC)     Diverticulitis     Hypertension     Macular  degeneration     Stroke (HCC)      Past Surgical History:   Procedure Laterality Date    CYSTOSCOPY       Social History   Social History     Substance and Sexual Activity   Alcohol Use No     Social History     Substance and Sexual Activity   Drug Use No     E-Cigarette/Vaping    E-Cigarette Use Never User      E-Cigarette/Vaping Substances    Nicotine No     THC No     CBD No     Flavoring No      Social History     Tobacco Use   Smoking Status Every Day    Current packs/day: 0.50    Types: Cigarettes   Smokeless Tobacco Never     Family History: History reviewed. No pertinent family history.    Review of previous medical records was completed.    Meds/Allergies   all current active meds have been reviewed, current meds:   Current Facility-Administered Medications   Medication Dose Route Frequency    acetaminophen (TYLENOL) tablet 650 mg  650 mg Oral Q4H PRN    aspirin chewable tablet 81 mg  81 mg Oral Daily    atorvastatin (LIPITOR) tablet 40 mg  40 mg Oral QPM    clopidogrel (PLAVIX) tablet 300 mg  300 mg Oral Once    [START ON 5/14/2024] clopidogrel (PLAVIX) tablet 75 mg  75 mg Oral Daily    donepezil (ARICEPT) tablet 10 mg  10 mg Oral HS    [START ON 5/14/2024] heparin (porcine) subcutaneous injection 5,000 Units  5,000 Units Subcutaneous Q8H FAUSTO    hydrALAZINE (APRESOLINE) injection 20 mg  20 mg Intravenous Once    hydrALAZINE (APRESOLINE) injection 5 mg  5 mg Intravenous Q6H PRN    LORazepam (ATIVAN) injection 1 mg  1 mg Intravenous Q6H PRN    memantine (NAMENDA) tablet 10 mg  10 mg Oral Daily    nicotine (NICODERM CQ) 14 mg/24hr TD 24 hr patch 1 patch  1 patch Transdermal Daily    tamsulosin (FLOMAX) capsule 0.4 mg  0.4 mg Oral Daily With Dinner   , and PTA meds:   Prior to Admission Medications   Prescriptions Last Dose Informant Patient Reported? Taking?   Apoaequorin (PREVAGEN PO) 5/12/2024  Yes Yes   Sig: Take 1 Capful by mouth in the morning   Multiple Vitamin (MULTIVITAMIN) capsule 5/12/2024  Yes Yes  "  Sig: Take 1 capsule by mouth daily   Multiple Vitamins-Minerals (OCUVITE PO) 5/12/2024  Yes Yes   Sig: Take 1 tablet by mouth daily   amLODIPine (NORVASC) 5 mg tablet 5/12/2024  Yes Yes   Sig: Take 5 mg by mouth daily   aspirin (Aspirin 81) 81 mg EC tablet 5/12/2024  Yes Yes   Sig: Take 81 mg by mouth daily   donepezil (ARICEPT) 10 mg tablet More than a month  Yes No   Sig: Take 10 mg by mouth daily at bedtime   Patient not taking: Reported on 5/13/2024   losartan (COZAAR) 50 mg tablet 5/12/2024  Yes Yes   Sig: Take 50 mg by mouth daily   memantine (NAMENDA) 10 mg tablet 5/12/2024  Yes Yes   Sig: Take 10 mg by mouth daily   promethazine-dextromethorphan (PHENERGAN-DM) 6.25-15 mg/5 mL oral syrup More than a month  Yes No   Sig: Take 5 mL by mouth 4 (four) times a day as needed   propranolol (INDERAL) 10 mg tablet 5/12/2024  Yes Yes   Sig: Take 10 mg by mouth 3 (three) times a day   sildenafil (VIAGRA) 25 MG tablet More than a month  Yes No   Sig: Take 25 mg by mouth daily as needed for erectile dysfunction   tamsulosin (FLOMAX) 0.4 mg 5/12/2024  Yes Yes   Sig: Take 0.4 mg by mouth daily with dinner      Facility-Administered Medications: None       No Known Allergies    Objective   Vitals:Blood pressure (!) 183/104, pulse 65, temperature (!) 97.2 °F (36.2 °C), temperature source Temporal, resp. rate 16, height 5' 9\" (1.753 m), weight 77.1 kg (170 lb), SpO2 97%.,Body mass index is 25.1 kg/m².  No intake or output data in the 24 hours ending 05/13/24 1508    Invasive Devices:   Invasive Devices       Peripheral Intravenous Line  Duration             Peripheral IV 05/13/24 Left Antecubital <1 day                    Physical Exam  Constitutional:       Appearance: He is well-developed.   HENT:      Head: Normocephalic and atraumatic.   Eyes:      Extraocular Movements: EOM normal.      Conjunctiva/sclera: Conjunctivae normal.   Pulmonary:      Effort: No respiratory distress.   Abdominal:      General: There is no " distension.   Musculoskeletal:         General: No swelling.      Cervical back: No rigidity.   Skin:     Coloration: Skin is not jaundiced.   Neurological:      Mental Status: He is alert and oriented to person, place, and time.      Coordination: Heel to Shin Test normal.   Psychiatric:         Speech: Speech normal.       Neurologic Exam     Mental Status   Oriented to person, place, and time.   Attention: normal. Concentration: normal.   Speech: speech is normal   Level of consciousness: alert  Knowledge: good.   Able to name object. Able to repeat. Normal comprehension.     Cranial Nerves     CN II   Visual fields full to confrontation.     CN III, IV, VI   Extraocular motions are normal.     CN V   Facial sensation intact.     CN VII   Facial expression full, symmetric.     CN VIII   Hearing: intact    CN XII   Tongue deviation: none    Motor Exam     Strength   Strength 5/5 except as noted. Slight drift present in LUE     Sensory Exam   Light touch normal.     Gait, Coordination, and Reflexes     Coordination   Heel to shin coordination: normalSlight dysmetria in LUE but appears in proportion to weakness       Lab Results: I have personally reviewed pertinent reports.  , CBC:   Results from last 7 days   Lab Units 05/13/24  1148   WBC Thousand/uL 9.94   RBC Million/uL 4.92   HEMOGLOBIN g/dL 15.3   HEMATOCRIT % 46.6   MCV fL 95   PLATELETS Thousands/uL 143*   , BMP/CMP:   Results from last 7 days   Lab Units 05/13/24  1148 05/09/24  1031   SODIUM mmol/L 142 142   POTASSIUM mmol/L 3.9 3.8   CHLORIDE mmol/L 110* 110*   CO2 mmol/L 26 27   BUN mg/dL 15 14   CREATININE mg/dL 1.06 0.98   CALCIUM mg/dL 9.1 8.6   AST U/L 16 19   ALT U/L 17 17   ALK PHOS U/L 102 81   EGFR ml/min/1.73sq m 65 77   , HgBA1C:   , Coagulation:   Results from last 7 days   Lab Units 05/13/24  1148 05/09/24  1031   PROTIME sec  --  13.6   INR  0.98 1.0   , Lipid Profile:     Imaging Studies: I have personally reviewed pertinent films in  PACS  EKG, Pathology, and Other Studies: I have personally reviewed pertinent reports.    VTE Prophylaxis: Heparin    Code Status: Level 1 - Full Code

## 2024-05-13 NOTE — ASSESSMENT & PLAN NOTE
"82 yo M with PMH of HTN who initally presented with diizziness. He was diagnosed on CTA with multiple indeterminate strokes 4 days ago but left ama at that time.  CTA revealed \" Hypodensities indicating age-indeterminate infarction in the   right basal ganglia, new from the prior. Small chronic infarct in the right   Caudate\"  Will admit for stroke pathway  MRI brain  Asa,plavix, statin  Echo  Pt/ot eval  Telemetry monitoring  Permissive htn for now  Neuro consult  "

## 2024-05-13 NOTE — ASSESSMENT & PLAN NOTE
84 y/o M with HTN and dementia that presents with transient left arm numbness, left arm weakness, generalized weakness in legs, and gait/balance dysfunction in the setting of multiple R hemispheric infarcts and severe b/l proximal ICA stenosis. Suspect symptomatic R carotid disease causing R MCA territory infarcts.    -continue neurochecks; notify with changes  -HCT reviewed - age-indeterminate infarcts in R BG and occipital lobe  -CTA reviewed - severe b/l ICA stenosis proximally; R vert hypoplastic and terminates or occludes intracranially  -MRI brain reviewed - multiple acute infarcts throughout R MCA territory  -obtain TTE  -telemetry  -recommend CUS and Vascular Surgery consultation; ideally would have carotid revascularization within 1-2 weeks if deemed a surgical candidate  -despite the L ICA being asymptomatic at this time, given the severity of stenosis it would be reasonable to consider revascularization of the L ICA at a later date  -continue home ASA; loading with Plavix 300mg now then 75mg daily for DAPT  -initiate Atorvastatin 40mg daily  -no need for permissive HTN at this point; goal normotension  -check lipid panel/A1c  -PT/OT  -will follow; call with questions

## 2024-05-13 NOTE — ED NOTES
This RN called MRI and made them aware that patient is claustrophobic so IV ativan was ordered by ED provider for patient to get prior to MRI. MRI does not have time for patient yet, but informed this RN that they will update her w/ MRI time so patient can be medicated accordingly.      Martha Skinner RN  05/13/24 0351

## 2024-05-13 NOTE — H&P
"[facility]  H&P  Name: Joe Aviles 83 y.o. male I MRN: [id number]  Unit/Bed#: ED 04 I Date of Admission: 5/13/2024   Date of Service: 5/13/2024 I Hospital Day: 0      Assessment/Plan   * Stroke-like symptoms  Assessment & Plan  84 yo M with PMH of HTN who initally presented with diizziness. He was diagnosed on CTA with multiple indeterminate strokes 4 days ago but left ama at that time.  CTA revealed \" Hypodensities indicating age-indeterminate infarction in the   right basal ganglia, new from the prior. Small chronic infarct in the right   Caudate\"  Will admit for stroke pathway  MRI brain  Asa,plavix, statin  Echo  Pt/ot eval  Telemetry monitoring  Permissive htn for now  Neuro consult    Dementia (HCC)  Assessment & Plan  C/w aricept  Mental status at baseline   Continue to monitor    BPH (benign prostatic hyperplasia)  Assessment & Plan  C/w flomax    HTN (hypertension)  Assessment & Plan  Hold home bp meds for now to allow permissive htn  Hydralaine for sbp >200           VTE Prophylaxis: Heparin  / sequential compression device   Code Status: full code  POLST: There is no POLST form on file for this patient (pre-hospital)  Discussion with family: pt    Anticipated Length of Stay:  Patient will be admitted on an Inpatient basis with an anticipated length of stay of  > 2 midnights.   Justification for Hospital Stay: stroke like symptoms    Total Time for Visit, including Counseling / Coordination of Care: 60 minutes.  Greater than 50% of this total time spent on direct patient counseling and coordination of care.    Chief Complaint:   dizziness    History of Present Illness:    Joe Aviles is a 83 y.o. male with PMH of HTN, BPH who initally presented with dizziness. Reports dizziness for the past few days and also reports difficulty walking. Otherwise denies any acute complaints. Denies numbness, weakness, slurred speech, confusion, chest pain, sob, cough, headache or any " other complaints.    Review of Systems:    Review of Systems   Constitutional:  Negative for appetite change, chills, diaphoresis, fatigue, fever and unexpected weight change.   HENT:  Negative for congestion, rhinorrhea and sore throat.    Eyes:  Negative for photophobia and visual disturbance.   Respiratory:  Negative for cough, shortness of breath and wheezing.    Cardiovascular:  Negative for chest pain, palpitations and leg swelling.   Gastrointestinal:  Negative for abdominal pain, anal bleeding, blood in stool, constipation, diarrhea, nausea and vomiting.   Genitourinary:  Negative for decreased urine volume, difficulty urinating, dysuria, flank pain, frequency, hematuria and urgency.   Musculoskeletal:  Negative for arthralgias, back pain, joint swelling and myalgias.   Skin:  Negative for color change and rash.   Neurological:  Positive for dizziness. Negative for seizures, facial asymmetry, speech difficulty, numbness and headaches.   Psychiatric/Behavioral:  Negative for agitation, confusion and decreased concentration. The patient is not nervous/anxious.        Past Medical and Surgical History:     Past Medical History:   Diagnosis Date    BPH (benign prostatic hyperplasia)     Dementia (HCC)     Diverticulitis     Hypertension     Stroke (HCC)        Past Surgical History:   Procedure Laterality Date    CYSTOSCOPY         Meds/Allergies:    Prior to Admission medications    Medication Sig Start Date End Date Taking? Authorizing Provider   amLODIPine (NORVASC) 5 mg tablet  10/4/21   Historical Provider, MD   donepezil (ARICEPT) 10 mg tablet Take 10 mg by mouth daily at bedtime    Historical Provider, MD   losartan (COZAAR) 50 mg tablet  10/4/21   Historical Provider, MD   memantine (NAMENDA) 10 mg tablet  8/16/21   Historical Provider, MD   Misc Natural Products (MENS PROSTATE HEALTH FORMULA PO) Take by mouth    Historical Provider, MD   Multiple Vitamin (MULTIVITAMIN) capsule Take 1 capsule by mouth  daily    Historical Provider, MD   promethazine-dextromethorphan (PHENERGAN-DM) 6.25-15 mg/5 mL oral syrup Take 5 mL by mouth 4 (four) times a day as needed 10/19/21   Historical Provider, MD   propranolol (INDERAL) 10 mg tablet Take 10 mg by mouth 3 (three) times a day    Historical Provider, MD   tamsulosin (FLOMAX) 0.4 mg  10/4/21   Historical Provider, MD LY have reviewed home medications with patient personally.    Allergies: No Known Allergies    Social History:     Marital Status: /Civil Union   O  Patient Pre-hospital Living Situation: home  Patient Pre-hospital Level of Mobility: independent  Patient Pre-hospital Diet Restrictions: none  Substance Use History:   Social History     Substance and Sexual Activity   Alcohol Use No     Social History     Tobacco Use   Smoking Status Every Day    Current packs/day: 0.50    Types: Cigarettes   Smokeless Tobacco Never     Social History     Substance and Sexual Activity   Drug Use No       Family History:    History reviewed. No pertinent family history.    Physical Exam:     Vitals:   Blood Pressure: 158/80 (05/13/24 1230)  Pulse: 65 (05/13/24 1230)  Temperature: 98.3 °F (36.8 °C) (05/13/24 1143)  Temp Source: Temporal (05/13/24 1143)  Respirations: 20 (05/13/24 1230)  SpO2: 97 % (05/13/24 1230)    Physical Exam  Constitutional:       General: He is not in acute distress.     Appearance: He is well-developed. He is not diaphoretic.   HENT:      Head: Normocephalic and atraumatic.      Nose: Nose normal.      Mouth/Throat:      Pharynx: No oropharyngeal exudate.   Eyes:      General: No scleral icterus.     Conjunctiva/sclera: Conjunctivae normal.   Cardiovascular:      Rate and Rhythm: Normal rate and regular rhythm.      Heart sounds: Normal heart sounds. No murmur heard.     No friction rub. No gallop.   Pulmonary:      Effort: Pulmonary effort is normal. No respiratory distress.      Breath sounds: Normal breath sounds. No wheezing or rales.   Chest:       Chest wall: No tenderness.   Abdominal:      General: Bowel sounds are normal. There is no distension.      Palpations: Abdomen is soft.      Tenderness: There is no abdominal tenderness. There is no guarding.   Musculoskeletal:         General: No tenderness or deformity. Normal range of motion.      Cervical back: Normal range of motion and neck supple.   Skin:     General: Skin is warm and dry.      Findings: No erythema.   Neurological:      Mental Status: He is alert. Mental status is at baseline.             Additional Data:     Lab Results: I have personally reviewed pertinent reports.      Results from last 7 days   Lab Units 05/13/24  1148   WBC Thousand/uL 9.94   HEMOGLOBIN g/dL 15.3   HEMATOCRIT % 46.6   PLATELETS Thousands/uL 143*   SEGS PCT % 68   LYMPHO PCT % 21   MONO PCT % 7   EOS PCT % 2     Results from last 7 days   Lab Units 05/13/24  1148   SODIUM mmol/L 142   POTASSIUM mmol/L 3.9   CHLORIDE mmol/L 110*   CO2 mmol/L 26   BUN mg/dL 15   CREATININE mg/dL 1.06   ANION GAP mmol/L 6   CALCIUM mg/dL 9.1   ALBUMIN g/dL 4.3   TOTAL BILIRUBIN mg/dL 0.68   ALK PHOS U/L 102   ALT U/L 17   AST U/L 16   GLUCOSE RANDOM mg/dL 142*     Results from last 7 days   Lab Units 05/13/24  1148   INR  0.98     Results from last 7 days   Lab Units 05/13/24  1142   POC GLUCOSE mg/dl 144*               Imaging: I have personally reviewed pertinent reports.      CTA head and neck with and without contrast   Final Result by Soren Angeles MD (05/13 6597)      CT head   - Age-indeterminate small infarcts in right basal ganglia and right occipital lobe. Consider MRI brain without contrast for further evaluation.   - Mild chronic microangiopathy.      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 This may be developmental over chronic occlusion.   - No other sites of large vessel occlusion in the head or neck.   - Severe stenosis  (approximately 83% narrowing) in bilateral ICA proximal cervical segments due to predominantly noncalcified atherosclerotic disease. Recommend vascular surgery consultation for further revaluation.   - Negative CTA head and neck for dissection or aneurysm.      Additional chronic/incidental findings as detailed above.         I personally discussed this study with MARCUS OCONNOR on 5/13/2024 12:44 PM.                                 Workstation performed: [id number]         MRI ED order    (Results Pending)       EKG, Pathology, and Other Studies Reviewed on Admission:   EKG: reviewed    Allscripts / Epic Records Reviewed: Yes     ** Please Note: This note has been constructed using a voice recognition system. **

## 2024-05-13 NOTE — ED PROVIDER NOTES
"History  Chief Complaint   Patient presents with    Dizziness     Patient reports dizziness with brain fog and headache starting approximately 1 hour ago. Patient dgx with stroke on Thursday.     83-year-old male presenting the emergency room with report previously of generalized weakness dizziness and balance issues ongoing for a few few weeks.  Patient was seen and evaluated at [facility] about 4 days ago.  Diagnosed with age-indeterminate basal ganglia and strokes.  Admission have been advised at that time.  Patient had refused admission and signed out AGAINST MEDICAL ADVICE.    Contacted his physician the following day and was told to return to the emergency room.  That was on the 10th.    Patient now presenting to the emergency room reporting that he has \"brain fog and dizziness.\"  Also with headache.      History provided by:  Patient and spouse  Dizziness  Quality:  Lightheadedness and imbalance  Severity:  Unable to specify  Onset quality:  Gradual  Duration:  4 days  Timing:  Constant  Progression:  Unchanged  Associated symptoms: no chest pain, no diarrhea, no nausea, no palpitations, no shortness of breath, no vomiting and no weakness        Prior to Admission Medications   Prescriptions Last Dose Informant Patient Reported? Taking?   Misc Natural Products (MENS PROSTATE HEALTH FORMULA PO)   Yes No   Sig: Take by mouth   Multiple Vitamin (MULTIVITAMIN) capsule   Yes No   Sig: Take 1 capsule by mouth daily   amLODIPine (NORVASC) 5 mg tablet   Yes No   donepezil (ARICEPT) 10 mg tablet   Yes No   Sig: Take 10 mg by mouth daily at bedtime   losartan (COZAAR) 50 mg tablet   Yes No   memantine (NAMENDA) 10 mg tablet   Yes No   promethazine-dextromethorphan (PHENERGAN-DM) 6.25-15 mg/5 mL oral syrup   Yes No   Sig: Take 5 mL by mouth 4 (four) times a day as needed   propranolol (INDERAL) 10 mg tablet   Yes No   Sig: Take 10 mg by mouth 3 (three) times a day   tamsulosin (FLOMAX) 0.4 mg   Yes No    "   Facility-Administered Medications: None       Past Medical History:   Diagnosis Date    BPH (benign prostatic hyperplasia)     Dementia (HCC)     Diverticulitis     Hypertension     Stroke (HCC)        Past Surgical History:   Procedure Laterality Date    CYSTOSCOPY         History reviewed. No pertinent family history.  I have reviewed and agree with the history as documented.    E-Cigarette/Vaping    E-Cigarette Use Never User      E-Cigarette/Vaping Substances    Nicotine No     THC No     CBD No     Flavoring No      Social History     Tobacco Use    Smoking status: Every Day     Current packs/day: 0.50     Types: Cigarettes    Smokeless tobacco: Never   Vaping Use    Vaping status: Never Used   Substance Use Topics    Alcohol use: No    Drug use: No       Review of Systems   Respiratory:  Negative for shortness of breath and wheezing.    Cardiovascular:  Negative for chest pain and palpitations.   Gastrointestinal:  Negative for diarrhea, nausea and vomiting.   Neurological:  Positive for dizziness and light-headedness. Negative for syncope and weakness.   All other systems reviewed and are negative.      Physical Exam  Physical Exam  Vitals and nursing note reviewed.   Constitutional:       General: He is not in acute distress.     Appearance: He is normal weight. He is not ill-appearing or toxic-appearing.   HENT:      Head: Normocephalic and atraumatic.      Right Ear: External ear normal.      Left Ear: External ear normal.      Nose: Nose normal.      Mouth/Throat:      Mouth: Mucous membranes are moist.   Cardiovascular:      Rate and Rhythm: Normal rate.      Heart sounds: No murmur heard.  Pulmonary:      Effort: Pulmonary effort is normal. No respiratory distress.      Breath sounds: No stridor. No wheezing or rales.   Abdominal:      General: Abdomen is flat. There is no distension.   Musculoskeletal:         General: No signs of injury.   Skin:     Coloration: Skin is not pale.   Neurological:       Mental Status: He is alert.      Comments: See NIH scale.   Psychiatric:         Mood and Affect: Mood normal.         Thought Content: Thought content normal.         Judgment: Judgment normal.         Vital Signs  ED Triage Vitals   Temperature Pulse Respirations Blood Pressure SpO2   05/13/24 1143 05/13/24 1143 05/13/24 1143 05/13/24 1145 05/13/24 1143   98.3 °F (36.8 °C) 76 17 (!) 209/102 97 %      Temp Source Heart Rate Source Patient Position - Orthostatic VS BP Location FiO2 (%)   05/13/24 1143 05/13/24 1143 05/13/24 1143 05/13/24 1143 --   Temporal Monitor Sitting Right arm       Pain Score       05/13/24 1230       2           Vitals:    05/13/24 1143 05/13/24 1145 05/13/24 1230   BP:  (!) 209/102 158/80   Pulse: 76  65   Patient Position - Orthostatic VS: Sitting  Lying         Visual Acuity  Visual Acuity      Flowsheet Row Most Recent Value   L Pupil Size (mm) 3   R Pupil Size (mm) 3            ED Medications  Medications   hydrALAZINE (APRESOLINE) injection 20 mg (20 mg Intravenous Not Given 5/13/24 1236)   LORazepam (ATIVAN) injection 1 mg (has no administration in time range)   iohexol (OMNIPAQUE) 350 MG/ML injection (MULTI-DOSE) 85 mL (85 mL Intravenous Given 5/13/24 1211)       Diagnostic Studies  Results Reviewed       Procedure Component Value Units Date/Time    HS Troponin 0hr (reflex protocol) [[id number]]  (Normal) Collected: 05/13/24 1148    Lab Status: Final result Specimen: Blood from Arm, Left Updated: 05/13/24 1220     hs TnI 0hr 4 ng/L     HS Troponin I 2hr [[id number]]     Lab Status: No result Specimen: Blood     HS Troponin I 4hr [[id number]]     Lab Status: No result Specimen: Blood     Comprehensive metabolic panel [[id number]]  (Abnormal) Collected: 05/13/24 1148    Lab Status: Final result Specimen: Blood from Arm, Left Updated: 05/13/24 1212     Sodium 142 mmol/L      Potassium 3.9 mmol/L      Chloride 110 mmol/L      CO2 26 mmol/L      ANION GAP 6 mmol/L      BUN 15 mg/dL       Creatinine 1.06 mg/dL      Glucose 142 mg/dL      Calcium 9.1 mg/dL      AST 16 U/L      ALT 17 U/L      Alkaline Phosphatase 102 U/L      Total Protein 7.3 g/dL      Albumin 4.3 g/dL      Total Bilirubin 0.68 mg/dL      eGFR 65 ml/min/1.73sq m     Narrative:      National Kidney Disease Foundation guidelines for Chronic Kidney Disease (CKD):     Stage 1 with normal or high GFR (GFR > 90 mL/min/1.73 square meters)    Stage 2 Mild CKD (GFR = 60-89 mL/min/1.73 square meters)    Stage 3A Moderate CKD (GFR = 45-59 mL/min/1.73 square meters)    Stage 3B Moderate CKD (GFR = 30-44 mL/min/1.73 square meters)    Stage 4 Severe CKD (GFR = 15-29 mL/min/1.73 square meters)    Stage 5 End Stage CKD (GFR <15 mL/min/1.73 square meters)  Note: GFR calculation is accurate only with a steady state creatinine    Protime-INR [[id number]]  (Normal) Collected: 05/13/24 1148    Lab Status: Final result Specimen: Blood from Arm, Left Updated: 05/13/24 1208     Protime 13.2 seconds      INR 0.98    APTT [[id number]]  (Normal) Collected: 05/13/24 1148    Lab Status: Final result Specimen: Blood from Arm, Left Updated: 05/13/24 1208     PTT 35 seconds     CBC and differential [[id number]]  (Abnormal) Collected: 05/13/24 1148    Lab Status: Final result Specimen: Blood from Arm, Left Updated: 05/13/24 1153     WBC 9.94 Thousand/uL      RBC 4.92 Million/uL      Hemoglobin 15.3 g/dL      Hematocrit 46.6 %      MCV 95 fL      MCH 31.1 pg      MCHC 32.8 g/dL      RDW 13.3 %      MPV 12.2 fL      Platelets 143 Thousands/uL      nRBC 0 /100 WBCs      Segmented % 68 %      Immature Grans % 1 %      Lymphocytes % 21 %      Monocytes % 7 %      Eosinophils Relative 2 %      Basophils Relative 1 %      Absolute Neutrophils 6.84 Thousands/µL      Absolute Immature Grans 0.06 Thousand/uL      Absolute Lymphocytes 2.09 Thousands/µL      Absolute Monocytes 0.66 Thousand/µL      Eosinophils Absolute 0.24 Thousand/µL      Basophils Absolute 0.05 Thousands/µL      Fingerstick Glucose (POCT) [[id number]]  (Abnormal) Collected: 05/13/24 1142    Lab Status: Final result Specimen: Blood Updated: 05/13/24 1143     POC Glucose 144 mg/dl                    CTA head and neck with and without contrast   Final Result by Soren Angeles MD (05/13 1247)      CT head   - Age-indeterminate small infarcts in right basal ganglia and right occipital lobe. Consider MRI brain without contrast for further evaluation.   - Mild chronic microangiopathy.      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 This may be developmental over chronic occlusion.   - No other sites of large vessel occlusion in the head or neck.   - Severe stenosis (approximately 83% narrowing) in bilateral ICA proximal cervical segments due to predominantly noncalcified atherosclerotic disease. Recommend vascular surgery consultation for further revaluation.   - Negative CTA head and neck for dissection or aneurysm.      Additional chronic/incidental findings as detailed above.         I personally discussed this study with MYLES MCKENZIE on 5/13/2024 12:44 PM.                                 Workstation performed: [id number]         MRI ED order    (Results Pending)              Procedures  ECG 12 Lead Documentation Only    Date/Time: 5/13/2024 11:53 AM    Performed by: Myles Mckenzie DO  Authorized by: Myles Mckenzie DO    ECG reviewed by me, the ED Provider: yes    Patient location:  ED  Previous ECG:     Previous ECG:  Unavailable  Interpretation:     Interpretation: normal    Rate:     ECG rate assessment: normal    Rhythm:     Rhythm: sinus rhythm    Ectopy:     Ectopy: none    QRS:     QRS axis:  Left  Conduction:     Conduction: normal    ST segments:     ST segments:  Non-specific  T waves:     T waves: non-specific             ED Course  ED Course as of 05/13/24 1303   Mon May 13, 2024   1153 Patient with known stroke.  Also with  history of dementia.  Also with history of recent rectal bleed.   1235 Discussed with neurology.  Recommending holding on dual antiplatelet therapy at this time until there is more definitive study via MRI.  Recommends admission for stroke pathway.   1245 Discussed findings of age-indeterminate infarcts with radiology.  Recommending MRI.   1302 Referred for admission.                  Stroke Assessment       Row Name 05/13/24 1206             NIH Stroke Scale    Interval Baseline      Level of Consciousness (1a.) 0      LOC Questions (1b.) 0      LOC Commands (1c.) 0      Best Gaze (2.) 0      Visual (3.) 0      Facial Palsy (4.) 0      Motor Arm, Left (5a.) 0      Motor Arm, Right (5b.) 0      Motor Leg, Left (6a.) 0      Motor Leg, Right (6b.) 0      Limb Ataxia (7.) 0      Sensory (8.) 0      Best Language (9.) 0      Dysarthria (10.) 0      Extinction and Inattention (11.) (Formerly Neglect) 0      Total 0                    Flowsheet Row Most Recent Value   Thrombolytic Decision Options    Thrombolytic Decision Patient not a candidate.   Patient is not a candidate options Unclear time of onset outside appropriate time window., comment  [Stroke occurred several days ago.  Was diagnosed at [facility] on the ninth.  Signed out AGAINST MEDICAL ADVICE.]                      SBIRT 20yo+      Flowsheet Row Most Recent Value   Initial Alcohol Screen: US AUDIT-C     1. How often do you have a drink containing alcohol? 0 Filed at: 05/13/2024 1145   2. How many drinks containing alcohol do you have on a typical day you are drinking?  0 Filed at: 05/13/2024 1145   3a. Male UNDER 65: How often do you have five or more drinks on one occasion? 0 Filed at: 05/13/2024 1145   3b. FEMALE Any Age, or MALE 65+: How often do you have 4 or more drinks on one occassion? 0 Filed at: 05/13/2024 1145   Audit-C Score 0 Filed at: 05/13/2024 1145   ZEINA: How many times in the past year have you...    Used an illegal drug or used a  prescription medication for non-medical reasons? Never Filed at: 05/13/2024 1145                      Medical Decision Making  Patient presented to the emergency department and a MSE was performed. The patient was evaluated for complaint related to acute dizziness.  Patient is potentially at risk for, but not limited to, benign positional vertigo, labyrinthitis, URI, stroke, intracranial abscess or tumor, systemic infectious process, hypertension or medication reaction.  Several of these diagnoses have been evaluated and ruled out by history and physical.  As needed, patient will be further evaluated with laboratory and imaging studies.  Higher level diagnostics, such as CT imaging or ultrasound, may also be required.  Please see work-up portion of the note for further evaluation of patient's risk.  Socioeconomic factors were also considered as part of the decision-making process.  Unless otherwise stated in the chart or patient is admitted as elsewhere documented, any previously prescribed medications will be maintained.      Problems Addressed:  Dizziness: chronic illness or injury with exacerbation, progression, or side effects of treatment  Stroke (HCC): chronic illness or injury with exacerbation, progression, or side effects of treatment  Uncontrolled hypertension: acute illness or injury     Details: Blood pressure improved to 158/80    Amount and/or Complexity of Data Reviewed  Independent Historian: spouse     Details: Spouse provided additional history.  External Data Reviewed: radiology, ECG and notes.  Labs: ordered.  Radiology: ordered.  Discussion of management or test interpretation with external provider(s): Care was managed with neurology and slim and radiology    Risk  Prescription drug management.  Decision regarding hospitalization.  Risk Details: Patient presented to the emergency department and a MSE was performed. The patient was evaluated and diagnosed with acute imbalance issues and evidence  of age-indeterminate infarcts. This is a new issue that will require additional planned work-up and treatment in a hospitalized setting. As may have been required as part of this evaluation, clinical laboratory test, radiology imaging and medical testing (I.e. EKG) were ordered as necessitated by the patient's presentation. I independently reviewed these studies, imaging and testing. This patient's case is considered to be a considerable risk secondary to the above listed disease process and poses a threat to the patient's well-being and baseline function. Further in-patient diagnostic testing and management, which may include the administration of parenteral medications, is required.                 Disposition  Final diagnoses:   Dizziness   Stroke (HCC)   Uncontrolled hypertension     Time reflects when diagnosis was documented in both MDM as applicable and the Disposition within this note       Time User Action Codes Description Comment    5/13/2024 12:08 PM Myles Mckenzie [R42] Dizziness     5/13/2024 12:08 PM Myles Mckenzie [I63.9] Stroke (HCC)     5/13/2024 12:08 PM Myles Mckenzie [I10] Uncontrolled hypertension           ED Disposition       ED Disposition   Admit    Condition   Stable    Date/Time   Mon May 13, 2024 1208    Comment                  Follow-up Information    None         Patient's Medications   Discharge Prescriptions    No medications on file       No discharge procedures on file.    PDMP Review       None            ED Provider  Electronically Signed by             Myles Mckenzie DO  05/13/24 1165

## 2024-05-14 ENCOUNTER — TELEPHONE (OUTPATIENT)
Dept: VASCULAR SURGERY | Facility: CLINIC | Age: 84
End: 2024-05-14

## 2024-05-14 ENCOUNTER — APPOINTMENT (INPATIENT)
Dept: NON INVASIVE DIAGNOSTICS | Facility: HOSPITAL | Age: 84
DRG: 067 | End: 2024-05-14
Payer: COMMERCIAL

## 2024-05-14 VITALS
SYSTOLIC BLOOD PRESSURE: 157 MMHG | HEIGHT: 69 IN | WEIGHT: 170 LBS | RESPIRATION RATE: 18 BRPM | OXYGEN SATURATION: 94 % | BODY MASS INDEX: 25.18 KG/M2 | DIASTOLIC BLOOD PRESSURE: 92 MMHG | TEMPERATURE: 97.2 F | HEART RATE: 92 BPM

## 2024-05-14 LAB
ANION GAP SERPL CALCULATED.3IONS-SCNC: 6 MMOL/L (ref 4–13)
ATRIAL RATE: 67 BPM
BASOPHILS # BLD AUTO: 0.04 THOUSANDS/ÂΜL (ref 0–0.1)
BASOPHILS NFR BLD AUTO: 1 % (ref 0–1)
BUN SERPL-MCNC: 17 MG/DL (ref 5–25)
CALCIUM SERPL-MCNC: 8.7 MG/DL (ref 8.4–10.2)
CHLORIDE SERPL-SCNC: 110 MMOL/L (ref 96–108)
CHOLEST SERPL-MCNC: 111 MG/DL
CO2 SERPL-SCNC: 26 MMOL/L (ref 21–32)
CREAT SERPL-MCNC: 0.98 MG/DL (ref 0.6–1.3)
DME PARACHUTE DELIVERY DATE REQUESTED: NORMAL
DME PARACHUTE DELIVERY NOTE: NORMAL
DME PARACHUTE ITEM DESCRIPTION: NORMAL
DME PARACHUTE ORDER STATUS: NORMAL
DME PARACHUTE SUPPLIER NAME: NORMAL
DME PARACHUTE SUPPLIER PHONE: NORMAL
EOSINOPHIL # BLD AUTO: 0.29 THOUSAND/ÂΜL (ref 0–0.61)
EOSINOPHIL NFR BLD AUTO: 3 % (ref 0–6)
ERYTHROCYTE [DISTWIDTH] IN BLOOD BY AUTOMATED COUNT: 13.3 % (ref 11.6–15.1)
EST. AVERAGE GLUCOSE BLD GHB EST-MCNC: 134 MG/DL
GFR SERPL CREATININE-BSD FRML MDRD: 72 ML/MIN/1.73SQ M
GLUCOSE SERPL-MCNC: 114 MG/DL (ref 65–140)
HBA1C MFR BLD: 6.3 %
HCT VFR BLD AUTO: 41.3 % (ref 36.5–49.3)
HDLC SERPL-MCNC: 26 MG/DL
HGB BLD-MCNC: 14 G/DL (ref 12–17)
IMM GRANULOCYTES # BLD AUTO: 0.06 THOUSAND/UL (ref 0–0.2)
IMM GRANULOCYTES NFR BLD AUTO: 1 % (ref 0–2)
LDLC SERPL CALC-MCNC: 59 MG/DL (ref 0–100)
LYMPHOCYTES # BLD AUTO: 1.57 THOUSANDS/ÂΜL (ref 0.6–4.47)
LYMPHOCYTES NFR BLD AUTO: 18 % (ref 14–44)
MCH RBC QN AUTO: 31.8 PG (ref 26.8–34.3)
MCHC RBC AUTO-ENTMCNC: 33.9 G/DL (ref 31.4–37.4)
MCV RBC AUTO: 94 FL (ref 82–98)
MONOCYTES # BLD AUTO: 0.63 THOUSAND/ÂΜL (ref 0.17–1.22)
MONOCYTES NFR BLD AUTO: 7 % (ref 4–12)
NEUTROPHILS # BLD AUTO: 5.94 THOUSANDS/ÂΜL (ref 1.85–7.62)
NEUTS SEG NFR BLD AUTO: 70 % (ref 43–75)
NRBC BLD AUTO-RTO: 0 /100 WBCS
P AXIS: 46 DEGREES
PLATELET # BLD AUTO: 123 THOUSANDS/UL (ref 149–390)
PMV BLD AUTO: 11.9 FL (ref 8.9–12.7)
POTASSIUM SERPL-SCNC: 3.5 MMOL/L (ref 3.5–5.3)
PR INTERVAL: 146 MS
QRS AXIS: -51 DEGREES
QRSD INTERVAL: 110 MS
QT INTERVAL: 384 MS
QTC INTERVAL: 405 MS
RBC # BLD AUTO: 4.4 MILLION/UL (ref 3.88–5.62)
SODIUM SERPL-SCNC: 142 MMOL/L (ref 135–147)
T WAVE AXIS: 25 DEGREES
TRIGL SERPL-MCNC: 129 MG/DL
VENTRICULAR RATE: 67 BPM
WBC # BLD AUTO: 8.53 THOUSAND/UL (ref 4.31–10.16)

## 2024-05-14 PROCEDURE — 80048 BASIC METABOLIC PNL TOTAL CA: CPT | Performed by: INTERNAL MEDICINE

## 2024-05-14 PROCEDURE — 85025 COMPLETE CBC W/AUTO DIFF WBC: CPT | Performed by: INTERNAL MEDICINE

## 2024-05-14 PROCEDURE — 99448 NTRPROF PH1/NTRNET/EHR 21-30: CPT

## 2024-05-14 PROCEDURE — 97167 OT EVAL HIGH COMPLEX 60 MIN: CPT

## 2024-05-14 PROCEDURE — 97116 GAIT TRAINING THERAPY: CPT

## 2024-05-14 PROCEDURE — 97163 PT EVAL HIGH COMPLEX 45 MIN: CPT

## 2024-05-14 PROCEDURE — 97535 SELF CARE MNGMENT TRAINING: CPT

## 2024-05-14 PROCEDURE — 93880 EXTRACRANIAL BILAT STUDY: CPT

## 2024-05-14 PROCEDURE — 83036 HEMOGLOBIN GLYCOSYLATED A1C: CPT | Performed by: INTERNAL MEDICINE

## 2024-05-14 PROCEDURE — 80061 LIPID PANEL: CPT | Performed by: INTERNAL MEDICINE

## 2024-05-14 PROCEDURE — 93880 EXTRACRANIAL BILAT STUDY: CPT | Performed by: SURGERY

## 2024-05-14 PROCEDURE — NC001 PR NO CHARGE

## 2024-05-14 PROCEDURE — 99239 HOSP IP/OBS DSCHRG MGMT >30: CPT

## 2024-05-14 RX ORDER — LOSARTAN POTASSIUM 50 MG/1
50 TABLET ORAL DAILY
Status: DISCONTINUED | OUTPATIENT
Start: 2024-05-14 | End: 2024-05-14 | Stop reason: HOSPADM

## 2024-05-14 RX ORDER — ATORVASTATIN CALCIUM 40 MG/1
40 TABLET, FILM COATED ORAL EVERY EVENING
Qty: 30 TABLET | Refills: 0 | Status: SHIPPED | OUTPATIENT
Start: 2024-05-14

## 2024-05-14 RX ORDER — CLOPIDOGREL BISULFATE 75 MG/1
75 TABLET ORAL DAILY
Qty: 30 TABLET | Refills: 0 | Status: SHIPPED | OUTPATIENT
Start: 2024-05-15

## 2024-05-14 RX ORDER — AMLODIPINE BESYLATE 5 MG/1
5 TABLET ORAL DAILY
Status: DISCONTINUED | OUTPATIENT
Start: 2024-05-14 | End: 2024-05-14 | Stop reason: HOSPADM

## 2024-05-14 RX ORDER — PROPRANOLOL HYDROCHLORIDE 10 MG/1
10 TABLET ORAL 3 TIMES DAILY
Status: DISCONTINUED | OUTPATIENT
Start: 2024-05-14 | End: 2024-05-14 | Stop reason: HOSPADM

## 2024-05-14 RX ORDER — ASPIRIN 81 MG/1
81 TABLET, CHEWABLE ORAL DAILY
Qty: 30 TABLET | Refills: 0 | Status: SHIPPED | OUTPATIENT
Start: 2024-05-15

## 2024-05-14 RX ADMIN — CLOPIDOGREL 75 MG: 75 TABLET ORAL at 08:38

## 2024-05-14 RX ADMIN — ACETAMINOPHEN 325MG 650 MG: 325 TABLET ORAL at 15:17

## 2024-05-14 RX ADMIN — ASPIRIN 81 MG 81 MG: 81 TABLET ORAL at 08:38

## 2024-05-14 RX ADMIN — AMLODIPINE BESYLATE 5 MG: 5 TABLET ORAL at 14:17

## 2024-05-14 RX ADMIN — MEMANTINE 10 MG: 10 TABLET ORAL at 08:38

## 2024-05-14 RX ADMIN — HEPARIN SODIUM 5000 UNITS: 5000 INJECTION, SOLUTION INTRAVENOUS; SUBCUTANEOUS at 05:14

## 2024-05-14 RX ADMIN — NICOTINE 1 PATCH: 14 PATCH, EXTENDED RELEASE TRANSDERMAL at 08:38

## 2024-05-14 RX ADMIN — HEPARIN SODIUM 5000 UNITS: 5000 INJECTION, SOLUTION INTRAVENOUS; SUBCUTANEOUS at 13:33

## 2024-05-14 RX ADMIN — PROPRANOLOL HYDROCHLORIDE 10 MG: 10 TABLET ORAL at 15:17

## 2024-05-14 RX ADMIN — TAMSULOSIN HYDROCHLORIDE 0.4 MG: 0.4 CAPSULE ORAL at 15:17

## 2024-05-14 RX ADMIN — LOSARTAN POTASSIUM 50 MG: 50 TABLET, FILM COATED ORAL at 14:17

## 2024-05-14 NOTE — UTILIZATION REVIEW
Initial Clinical Review    Admission: Date/Time/Statement:   Admission Orders (From admission, onward)       Ordered        05/13/24 1302  INPATIENT ADMISSION  Once                          Orders Placed This Encounter   Procedures    INPATIENT ADMISSION     Standing Status:   Standing     Number of Occurrences:   1     Order Specific Question:   Level of Care     Answer:   Med Surg [16]     Order Specific Question:   Estimated length of stay     Answer:   More than 2 Midnights     Order Specific Question:   Certification     Answer:   I certify that inpatient services are medically necessary for this patient for a duration of greater than two midnights. See H&P and MD Progress Notes for additional information about the patient's course of treatment.     ED Arrival Information       Expected   -    Arrival   5/13/2024 11:36    Acuity   Urgent              Means of arrival   Walk-In    Escorted by   Spouse    Service   Hospitalist    Admission type   Emergency              Arrival complaint   Pt had stroke on thurs, unable to use left arm             Chief Complaint   Patient presents with    Dizziness     Patient reports dizziness with brain fog and headache starting approximately 1 hour ago. Patient dgx with stroke on Thursday.       Initial Presentation: 83 y.o. male with hx HTN, BPH , dementia on Aricept who initally presents to ED from home  with dizziness. Reports dizziness for the past few days and also reports difficulty walking. Pt reports SOTO . Pt was  evaluated at [facility] about 4 days ago. Diagnosed with age-indeterminate strokes.  Pt refused admission at that time, left AMA . On exam today, pt alert, at mental baseline .BP elevated .NIHSS0 .  CT head shows age-indeterminate small infarcts in right basal ganglia and right occipital lobe. CTA H/N shows Markedly hypoplastic right vertebral artery appears to terminate in distal V3/proximal V4 segment,  severe stenosis (approximately 83%  narrowing) in bilateral ICA proximal cervical segments . Pt admitted as Inpatient to telemetry with stroke like sx . HTN. Plan- Stroke path- telemetry , neuro consult. MRI brain. Asa,plavix, statin . ECHO. PT/OT. Permissive HTN for now. IV Hydralaine for sbp >200     Neuro consult- telemed - MRI brain reviewed - multiple acute infarcts throughout R MCA territory .CTA reviewed - severe b/l ICA stenosis proximally; R vert hypoplastic and terminates or occludes intracranially    Suspect symptomatic R carotid disease causing R MCA territory infarcts. Plan- TTE. Telemetry. Neuro checks . ecommend CUS and Vascular Surgery consultation; ideally would have carotid revascularization within 1-2 weeks if deemed a surgical candidate. Ddespite the L ICA being asymptomatic at this time, given the severity of stenosis it would be reasonable to consider revascularization of the L ICA at a later date .  Continue home ASA; loading with Plavix 300mg now then 75mg daily for DAPT . Start Atorvastatin 40mg daily . No need for permissive HTN at this point; goal normotension . Lipid A1c. PT/OT .     Anticipated Length of Stay/Certification Statement: Patient will be admitted on an Inpatient basis with an anticipated length of stay of  > 2 midnights.   Justification for Hospital Stay: stroke like symptoms     Date: 5/14    Day 2:    SLP-  Pt passed nsg dysphagia assessment . When offered assessment and referral for ongoing therapy, pt and wife declined and stated they will be using home health care.    [facility] sx consult- Multifocal acute infarcts throughout right MCA territory. No focal neuro deficits on exam today pe internal med. .  CV duplex pending to assess ICA velocities. Patient will likely benefit from right carotid revascularization (CEA vs TCAR) for further stroke prevention. Per neuro, ideally intervention should be performed in next 1-2 weeks .  Patient and wife unsure whether they would like to consider surgical intervention at  this time.  Will ask for cardiac risk stratification prior to vascular surgery. TTE showed EF 60% with mild concentric hypertrophy. Cardiology consulted. Continue DAPT, statin. BP control per neuro .   PT- level III rehab resource intensity .AM-PAC Basic Mobility Inpatient Short Form Raw Score is 20. A Raw score of greater than 16 suggests the patient may benefit from discharge to home.        Update- Carotid artery ultrasound also with significant stenosis . Patient and wife now in agreement to proceed with R carotid surgery currently scheduled for 5/22 at [facility] with Dr. Barajas.A1c: 6.3 . Lipid panel: cholesterol: 111, triglycerides: 129, HDL: 26, LDL: 59 .He will be discharged and readmitted for the procedure .  Do not stop ASA, plavix prior to surgery. No further weakness in the left upper extremity. No dizziness, lightheadedness     ED Triage Vitals   Temperature Pulse Respirations Blood Pressure SpO2   05/13/24 1143 05/13/24 1143 05/13/24 1143 05/13/24 1145 05/13/24 1143   98.3 °F (36.8 °C) 76 17 (!) 209/102 97 %      Temp Source Heart Rate Source Patient Position - Orthostatic VS BP Location FiO2 (%)   05/13/24 1143 05/13/24 1143 05/13/24 1143 05/13/24 1143 --   Temporal Monitor Sitting Right arm       Pain Score       05/13/24 1230       2          Wt Readings from Last 1 Encounters:   05/13/24 77.1 kg (170 lb)     Additional Vital Signs:   ate/Time Temp Pulse Resp BP MAP (mmHg) SpO2   05/14/24 10:28:08 -- 98 -- 197/105 Abnormal  136 96 %   05/14/24 0915 97.2 °F (36.2 °C) Abnormal  89 18 169/100 123 96 %   05/14/24 0730 -- -- -- -- -- 96 %   05/14/24 05:10:50 97 °F (36.1 °C) Abnormal  82 18 169/100 123 96 %   05/14/24 0115 97.2 °F (36.2 °C) Abnormal  74 18 154/86 109 93 %   05/13/24 2315 97 °F (36.1 °C) Abnormal  71 18 137/77 96 92 %   05/13/24 2115 97.2 °F (36.2 °C) Abnormal  72 16 132/76 95 96 %   05/13/24 1915 97.2 °F (36.2 °C) Abnormal  81 17 149/84 106 97 %   05/13/24 1900 -- 81  -- -- -- 97 %   05/13/24 1815 97.2 °F (36.2 °C) Abnormal  -- -- -- -- --   05/13/24 1715 97 °F (36.1 °C) Abnormal  75 18 179/103 Abnormal  128 --   05/13/24 1615 97 °F (36.1 °C) Abnormal  66 18 180/103 Abnormal  -- --   05/13/24 15:18:03 97.2 °F (36.2 °C) Abnormal  67 18 181/105 Abnormal  130 97 %   05/13/24 1515 97.2 °F (36.2 °C) Abnormal  70 18 181/105 Abnormal  -- --   05/13/24 1415 97.2 °F (36.2 °C) Abnormal  65 16 183/104 Abnormal  130 --   05/13/24 1230 -- 65 20 158/80 110 97 %       Date and Time Eye Opening Best Verbal Response Best Motor Response Nahid Coma Scale Score   05/14/24 0915 4 5 6 15   05/14/24 0730 4 5 6 15   05/14/24 0515 4 5 6 15   05/14/24 0115 4 5 6 15   05/13/24 2315 4 5 6 15   05/13/24 2115 4 5 6 15   05/13/24 1915 4 5 6 15   05/13/24 1715 4 5 6 15   05/13/24 1615 4 5 6 15   05/13/24 1515 4 5 6 15   05/13/24 1415 4 5 6 15   05/13/24 1144 4 5 6 15         Pertinent Labs/Diagnostic Test Results:    5/13   ECG- Normal sinus rhythm  Left axis deviation   ECHO- TTE      Left Ventricle: Left ventricular cavity size is normal. There is mild concentric hypertrophy. The left ventricular ejection fraction is 60% by biplane measurement. Systolic function is normal. Although no diagnostic regional wall motion abnormality was identified, this possibility cannot be completely excluded on the basis of this study. Diastolic function is mildly abnormal, consistent with grade I (abnormal) relaxation.    Atrial Septum: No patent foramen ovale detected, confirmed at rest using agitated saline contrast, confirmed by provocation with cough, using agitated saline contrast and with valsalva, using agitated saline contrast.    IVC/SVC: The right atrial pressure is estimated at 8.0 mmHg. The inferior vena cava is normal in size. Respirophasic changes were blunted (less than 50% variation).        MRI brain wo contrast   Final Result by Soren Angeles MD (05/13 6667)      Multifocal small acute/subacute  infarcts in right frontal, right parietal, right temporal, right occipital lobes and right basal ganglia.      Chronic small infarcts in right frontal, right parietal, and right occipital lobes.      Moderate chronic microangiopathy.      The study was marked in EPIC for immediate notification.            Workstation performed: [id number]         CTA head and neck with and without contrast   Final Result by Soren Angeles MD (05/13 1247)      CT head   - Age-indeterminate small infarcts in right basal ganglia and right occipital lobe. Consider MRI brain without contrast for further evaluation.   - Mild chronic microangiopathy.      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 This may be developmental over chronic occlusion.   - No other sites of large vessel occlusion in the head or neck.   - Severe stenosis (approximately 83% narrowing) in bilateral ICA proximal cervical segments due to predominantly noncalcified atherosclerotic disease. Recommend vascular surgery consultation for further revaluation.   - Negative CTA head and neck for dissection or aneurysm.      Additional chronic/incidental findings as detailed above.         I personally discussed this study with MARCUS OCONNOR on 5/13/2024 12:44 PM.                                 Workstation performed: [id number]         VAS carotid complete study    (5/14/24    RIGHT:  There is >70% stenosis noted in the internal carotid artery. Plaque is  heterogenous and irregular.  Vertebral artery flow is antegrade. There is no significant subclavian artery  disease.     LEFT:  There is 70-99% stenosis noted in the internal carotid artery. Plaque is  heterogenous and irregular.  Vertebral artery flow is antegrade. There is no significant subclavian artery  disease.         Results from last 7 days   Lab Units 05/14/24  0501 05/13/24  1148   WBC Thousand/uL 8.53 9.94   HEMOGLOBIN g/dL  14.0 15.3   HEMATOCRIT % 41.3 46.6   PLATELETS Thousands/uL 123* 143*   TOTAL NEUT ABS Thousands/µL 5.94 6.84         Results from last 7 days   Lab Units 05/14/24  0501 05/13/24  1148 05/09/24  1031   SODIUM mmol/L 142 142 142   POTASSIUM mmol/L 3.5 3.9 3.8   CHLORIDE mmol/L 110* 110* 110*   CO2 mmol/L 26 26 27   ANION GAP mmol/L 6 6 5   BUN mg/dL 17 15 14   CREATININE mg/dL 0.98 1.06 0.98   EGFR ml/min/1.73sq m 72 65 77   CALCIUM mg/dL 8.7 9.1 8.6     Results from last 7 days   Lab Units 05/13/24  1148 05/09/24  1031   AST U/L 16 19   ALT U/L 17 17   ALK PHOS U/L 102 81   TOTAL PROTEIN g/dL 7.3 6.4   ALBUMIN g/dL 4.3 3.9   TOTAL BILIRUBIN mg/dL 0.68 0.6     Results from last 7 days   Lab Units 05/13/24  1142   POC GLUCOSE mg/dl 144*     Results from last 7 days   Lab Units 05/14/24  0501 05/13/24  1148 05/09/24  1031   GLUCOSE RANDOM mg/dL 114 142* 137*         Results from last 7 days   Lab Units 05/14/24  0501   HEMOGLOBIN A1C % 6.3*   EAG mg/dl 134             Results from last 7 days   Lab Units 05/13/24  1809 05/13/24  1549 05/13/24  1148   HS TNI 0HR ng/L  --   --  4   HS TNI 2HR ng/L  --  4  --    HSTNI D2 ng/L  --  0  --    HS TNI 4HR ng/L 4  --   --    HSTNI D4 ng/L 0  --   --          Results from last 7 days   Lab Units 05/13/24  1148 05/09/24  1031   PROTIME seconds 13.2  --    INR  0.98 1.0   PTT seconds 35  --                        ED Treatment:   Medication Administration from 05/13/2024 1132 to 05/13/2024 1322         Date/Time Order Dose Route Action Comments     05/13/2024 1236 EDT hydrALAZINE (APRESOLINE) injection 20 mg 20 mg Intravenous Not Given BP came down. Dr. Mckenzie made aware. Med not to be given.     05/13/2024 1211 EDT iohexol (OMNIPAQUE) 350 MG/ML injection (MULTI-DOSE) 85 mL 85 mL Intravenous Given --          Past Medical History:   Diagnosis Date    BPH (benign prostatic hyperplasia)     Dementia (HCC)     Diverticulitis     Hypertension     Macular degeneration     Stroke (HCC)       Present on Admission:   Acute ischemic multifocal right-sided anterior circulation stroke (HCC)      Admitting Diagnosis: Dizziness [R42]  Stroke (HCC) [I63.9]  Uncontrolled hypertension [I10]  Age/Sex: 83 y.o. male  Admission Orders:  Scheduled Medications:  aspirin, 81 mg, Oral, Daily  atorvastatin, 40 mg, Oral, QPM  clopidogrel, 75 mg, Oral, Daily  donepezil, 10 mg, Oral, HS  heparin (porcine), 5,000 Units, Subcutaneous, Q8H FAUSTO  memantine, 10 mg, Oral, Daily  nicotine, 1 patch, Transdermal, Daily  tamsulosin, 0.4 mg, Oral, Daily With Dinner    clopidogrel (PLAVIX) tablet 300 mg  Dose: 300 mg  Freq: Once Route: PO  x1 5/13 @ 1549   Continuous IV Infusions:     PRN Meds:  acetaminophen, 650 mg, Oral, Q4H PRN  hydrALAZINE, 5 mg, Intravenous, Q6H PRN  LORazepam, 1 mg, Intravenous, Q6H PRN x1 5/13        Telemetry   Reg diet   NIHSS   Neuro checks    SCD   OOB as lisa         IP CONSULT TO NEUROLOGY  IP CONSULT TO CASE MANAGEMENT  IP CONSULT TO NUTRITION SERVICES    Network Utilization Review Department  ATTENTION: Please call with any questions or concerns to [phone] and carefully listen to the prompts so that you are directed to the right person. All voicemails are confidential.   For Discharge needs, contact Care Management DC Support Team at [phone] opt. 2  Send all requests for admission clinical reviews, approved or denied determinations and any other requests to dedicated fax number below belonging to the campus where the patient is receiving treatment. List of dedicated fax numbers for the Facilities:  FACILITY NAME UR FAX NUMBER   ADMISSION DENIALS (Administrative/Medical Necessity) [phone]   DISCHARGE SUPPORT TEAM (NETWORK) [phone]   PARENT CHILD HEALTH (Maternity/NICU/Pediatrics) [phone]   [facility] [phone]   [facility] [phone]   [facility] [phone]   [facility]  [place] [phone]   [facility] [phone]   [facility] [phone]   [facility] [phone]   [facility] [phone]   [facility] [phone]   [facility] [phone]   [facility] [phone]   [facility] [phone]

## 2024-05-14 NOTE — DISCHARGE INSTR - AVS FIRST PAGE
Dear Joe Aviles,     It was our pleasure to care for you here at [facility]. For follow up as well as any medication refills, we recommend that you follow up with your primary care physician. Here are the most important instructions/ recommendations at discharge:     Notable Medication Adjustments -   Start taking lipitor 40 mg daily  Start taking plavix 75 mg daily  Start taking aspirin 81 mg daily  Testing Required after Discharge -   Further testing per vascular surgery and neurology recommendations  Important follow up information -   Follow up with PCP in 1 week  Follow up with vascular surgery in 1 week  Follow up with neurology in 6 weeks  Follow up with cardiology within the week for cardiac clearance - please call both [facility] cardiology office to see if you can have cardiac clearance and [facility] cardiology to schedule cardiac clearance  Follow up with vascular surgery for surgery on 5/22 at [address].   Other Instructions -   Please continue taking medications as prescribed. If you have any new or worsening symptoms, please return to the ED for further evaluation.   Please review this entire after visit summary as additional general instructions including medication list, appointments, activity, diet, any pertinent wound care, and other additional recommendations from your care team that may be provided for you.      Sincerely,     Louise Stringer PA-C

## 2024-05-14 NOTE — PHYSICAL THERAPY NOTE
PHYSICAL THERAPY EVALUATION      NAME:  Joe Aviles    DATE: 05/14/24    AGE:   83 y.o.  Mrn:   [id number]  ADMIT DX:  Dizziness [R42]  Stroke (HCC) [I63.9]  Uncontrolled hypertension [I10]    Past Medical History:   Diagnosis Date    BPH (benign prostatic hyperplasia)     Dementia (HCC)     Diverticulitis     Hypertension     Macular degeneration     Stroke (HCC)      Length Of Stay: 1  Performed at least 2 patient identifiers during session: Name and Birthday          PHYSICAL THERAPY EVALUATION:       05/14/24 0959   PT Last Visit   PT Visit Date 05/14/24   Note Type   Note type Evaluation   Pain Assessment   Pain Assessment Tool 0-10   Pain Score 2   Pain Location/Orientation   (Headache)   Restrictions/Precautions   Weight Bearing Precautions Per Order No   Other Precautions Chair Alarm;Bed Alarm;Fall Risk   Home Living   Type of Home Mobile home   Home Layout One level;Performs ADLs on one level;Able to live on main level with bedroom/bathroom;Stairs to enter with rails  (4 LEIGHANN B rails)   Bathroom Shower/Tub Walk-in shower   Bathroom Toilet Standard   Home Equipment Cane  (Rollator)   Prior Function   Level of [place] Independent with ADLs;Independent with functional mobility;Needs assistance with IADLS   Lives With Spouse   Receives Help From Family;Friend(s)   IADLs Independent with medication management;Family/Friend/Other provides transportation;Family/Friend/Other provides meals   Falls in the last 6 months 0   Comments IND no AD   General   Additional Pertinent History MRI showing multiple R infarcts.  Hx of dementia.   Family/Caregiver Present Yes   Cognition   Arousal/Participation Cooperative   Orientation Level Oriented X4   RLE Assessment   RLE Assessment WFL   LLE Assessment   LLE Assessment WFL  (L LE slightly weaker than R but remains WFL)   Vision-Basic Assessment   Visual History Macular degeneration   Light Touch   RLE Light Touch Grossly intact   LLE Light Touch Grossly intact    Proprioception   RLE Proprioception Grossly intact   LLE Proprioception Grossly Intact   Bed Mobility   Supine to Sit 6  Modified independent   Transfers   Sit to Stand   (SBA)   Additional items Verbal cues   Stand to Sit   (SBA)   Additional items Verbal cues   Stand pivot   (SBA)   Additional items Verbal cues   Additional Comments No AD   Ambulation/Elevation   Gait pattern Improper Weight shift;Inconsistent juan   Gait Assistance   (SBA)   Additional items Verbal cues   Assistive Device None   Distance 10 ft   Stair Management Assistance   (see tx aspect of note)   Balance   Static Sitting Normal   Dynamic Sitting Good   Static Standing Fair +   Dynamic Standing Fair   Ambulatory Fair   Activity Tolerance   Activity Tolerance Patient limited by fatigue   Medical Staff Made Aware OT Jacquelyn   Assessment   Prognosis Good   Problem List Decreased strength;Impaired balance;Decreased mobility;Decreased endurance   Barriers to Discharge None   Goals   STG Expiration Date 05/28/24   PT Treatment Day 1   Plan   Treatment/Interventions Functional transfer training;LE strengthening/ROM;Elevations;Therapeutic exercise;Endurance training;Patient/family training;Equipment eval/education;Gait training;Compensatory technique education;OT;Spoke to advanced practitioner   PT Frequency 3-5x/wk   Discharge Recommendation   Rehab Resource Intensity Level, PT III (Minimum Resource Intensity)   Additional Comments use of cane outside of the home   AM-PAC Basic Mobility Inpatient   Turning in Flat Bed Without Bedrails 4   Lying on Back to Sitting on Edge of Flat Bed Without Bedrails 4   Moving Bed to Chair 3   Standing Up From Chair Using Arms 3   Walk in Room 3   Climb 3-5 Stairs With Railing 3   Basic Mobility Inpatient Raw Score 20   Basic Mobility Standardized Score 43.99   [facility] Highest Level Of Mobility   JH-HLM Goal 6: Walk 10 steps or more   JH-HLM Achieved 7: Walk 25 feet or more   Additional Treatment Session    Start Time 1027   End Time 1037   Treatment Assessment Pt tolerates tx session fairly well.  Interventions consisting of transfer training, gait training, and stair navigation. Pt noted to have an elevated BP with exertion of 197/105; reports continued HA of 2/10 and mild dizziness of 2/10 as well.  Spouse pleased with pt's current functional mobility status.   Equipment Use Pt ambulates 45 ft x 1 and 115 ft x 1 no AD SPV.  Pt navigates 5 steps using B rails SPV.   End of Consult   Patient Position at End of Consult Bedside chair;Bed/Chair alarm activated;All needs within reach     (Please find full objective findings from PT assessment regarding body systems outlined above).     Assessment: Pt is a 83 y.o. male seen for PT evaluation s/p admission to [facility] on 5/13/2024 with Acute ischemic multifocal right-sided anterior circulation stroke (HCC).  Order placed for PT services.  Upon evaluation: Pt is presenting with impaired functional mobility due to decreased strength, decreased endurance, impaired balance, gait deviations, and fall risk requiring  stand-by assistance for transfers and ambulation with no AD . Pt's clinical presentation is currently unstable/unpredictable given the functional mobility deficits above, coupled with fall risks as indicated by AM-PAC 6-Clicks: 20/24 as well as polypharmacy.  Pt's PMHx and comorbidities that may affect physical performance and progress include: CVA, Dementia, HTN, and limited vision. Personal factors affecting pt at time of IE include: questionable non-compliance, anxiety, step(s) to enter environment, advanced age, and inability to perform IADLs. Pt will benefit from continued skilled PT services to address deficits as defined above and to maximize level of functional mobility to facilitate return toward PLOF and improved QOL. From PT/mobility standpoint, recommendation at time of d/c would be Level III (Minimum Resource Intensity) pending  progress in order to reduce fall risk and maximize pt's functional independence and consistency with mobility in order to facilitate return to PLOF.  Recommend ther ex next 1-2 sessions and dynamic standing balance activities .     The patient's AM-PAC Basic Mobility Inpatient Short Form Raw Score is 20. A Raw score of greater than 16 suggests the patient may benefit from discharge to home. Please also refer to the recommendation of the Physical Therapist for safe discharge planning.       Goals: Pt will perform transfers with modified I to increase Indep in home environment and prepare for ambulation. Pt will ambulate with no AD for >/= 250 ft with  modified I  to decrease risk for falls and improve gait quality and to access home environment. Pt will complete >/= 4 steps with with bilateral handrails with modified I to return to home with LEIGHANN. Pt will participate in objective balance assessment to determine baseline fall risk.        Wilfredo Trevizo, PT,DPT

## 2024-05-14 NOTE — PLAN OF CARE
Problem: PHYSICAL THERAPY ADULT  Goal: Performs mobility at highest level of function for planned discharge setting.  See evaluation for individualized goals.  Description: Treatment/Interventions: Functional transfer training, LE strengthening/ROM, Elevations, Therapeutic exercise, Endurance training, Patient/family training, Equipment eval/education, Gait training, Compensatory technique education, OT, Spoke to advanced practitioner          See flowsheet documentation for full assessment, interventions and recommendations.  Note: Prognosis: Good  Problem List: Decreased strength, Impaired balance, Decreased mobility, Decreased endurance  Assessment: Pt is a 83 y.o. 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  Order placed for PT services.  Upon evaluation: Pt is presenting with impaired functional mobility due to decreased strength, decreased endurance, impaired balance, gait deviations, and fall risk requiring  stand-by assistance for transfers and ambulation with no AD . Pt's clinical presentation is currently unstable/unpredictable given the functional mobility deficits above, coupled with fall risks as indicated by AM-PAC 6-Clicks: 20/24 as well as polypharmacy.  Pt's PMHx and comorbidities that may affect physical performance and progress include: CVA, Dementia, HTN, and limited vision. Personal factors affecting pt at time of IE include: questionable non-compliance, anxiety, step(s) to enter environment, advanced age, and inability to perform IADLs. Pt will benefit from continued skilled PT services to address deficits as defined above and to maximize level of functional mobility to facilitate return toward PLOF and improved QOL. From PT/mobility standpoint, recommendation at time of d/c would be Level III (Minimum Resource Intensity) pending progress in order to reduce fall risk and  maximize pt's functional independence and consistency with mobility in order to facilitate return to PLOF.  Recommend ther ex next 1-2 sessions and dynamic standing balance activities .  Barriers to Discharge: None     Rehab Resource Intensity Level, PT: III (Minimum Resource Intensity)    See flowsheet documentation for full assessment.

## 2024-05-14 NOTE — ASSESSMENT & PLAN NOTE
"82 yo M with PMH of HTN who initally presented with diizziness. He was diagnosed on CTA with multiple indeterminate strokes 4 days ago but left ama at that time.  CTA revealed \" Hypodensities indicating age-indeterminate infarction in the right basal ganglia, new from the prior. Small chronic infarct in the right Caudate; Severe stenosis (approximately 83% narrowing) in bilateral ICA proximal cervical segments due to predominantly noncalcified atherosclerotic disease.\"  Will admit for stroke pathway  MRI brain: Multifocal small acute/subacute infarcts in right frontal, right parietal, right temporal, right occipital lobes and right basal ganglia. Chronic small infarcts in right frontal, right parietal, and right occipital lobes.  Asa,plavix, statin  Echo WNL  Pt/ot eval  Telemetry monitoring  Neuro consult: recommending CUS and vascular surgery consult, continue ASA, load with plavix 300 mg then 75 mg daily for DAPT, start lipitor 40 mg qd, follow up with neurology in 6 weeks.  Carotid artery ultrasound also with significant stenosis  Vascular surgery consult: Patient will likely benefit from right carotid revascularization (CEA vs TCAR) for further stroke prevention. Per neuro, ideally intervention should be performed in next 1-2 weeks. Surgery would have to be done at [facility] or [facility]. Will ask for cardiac risk stratification prior to vascular surgery. TTE showed EF 60% with mild concentric hypertrophy.   Patient would like to be discharged today and outpatient follow up with cardiology clearance and outpatient vascular surgery follow up prior to surgery. Follow up with cardiology for appointment on Friday 5/17 for cardiac clearance and vascular surgery on 5/21 prior to operation on 5/22. Continue with ASA and plavix prior to surgery.   Instructed to return to hospital if any new or worsening symptoms verbalized understanding.   Symptoms resolved. Continue ASA, plavix, lipitor on discharge. Follow up with cards, " neurology, vascular surgery.

## 2024-05-14 NOTE — CASE MANAGEMENT
Case Management Discharge Planning Note    Patient name Joe Aviles  Location /-01 MRN [id number]  : 1940 Date 2024       Current Admission Date: 2024  Current Admission Diagnosis:Acute ischemic multifocal right-sided anterior circulation stroke (HCC)   Patient Active Problem List    Diagnosis Date Noted    HTN (hypertension) 2024    BPH (benign prostatic hyperplasia) 2024    Dementia (HCC) 2024    Stroke-like symptoms 2024    Acute ischemic multifocal right-sided anterior circulation stroke (HCC) 2024      LOS (days): 1  Geometric Mean LOS (GMLOS) (days): 3.3  Days to GMLOS:2.3     OBJECTIVE:  Risk of Unplanned Readmission Score: 9.58         Current admission status: Inpatient   Preferred Pharmacy:   [facility] [address] [place] [address] [address] [facility]  [address] [facility] [address]  Phone: [phone] Fax: [phone]    [facility]pharmacy #[id number] [place], [place] - [address] [place] [id number]  Phone: [phone] Fax: [phone]    [facility] [address] [facility]  [address] [facility] [address]  Phone: [phone] Fax: [phone]    Primary Care Provider: Jayme Wolff MD    Primary Insurance: [facility]  Secondary Insurance: [employer]    DISCHARGE DETAILS:     A BSC has been recommended for pt at time of discharge.  CM placing order for BSC to [facility] in [place]            BSC delivered to pt bedside, all paperwork signed

## 2024-05-14 NOTE — SPEECH THERAPY NOTE
Speech Language/Pathology  Consult received.  Records reviewed.  Pt admitted c symptoms concerning for CVA/TIA.  Pt passed RN Dysphagia Assessment.  Communication deficits denied.  Pt and family report ongoing cognitive issues. When offered assessment and referral for ongoing therapy, pt and wife declined and stated they will be using home health care.      MRI results   Multifocal small acute/subacute infarcts in right frontal, right parietal, right temporal, right occipital lobes and right basal ganglia.  Chronic small infarcts in right frontal, right parietal, and right occipital lobes.  Moderate chronic microangiopathy.        Gretel Kong MS CCC-SLP  5/14/2024

## 2024-05-14 NOTE — OCCUPATIONAL THERAPY NOTE
Occupational Therapy Evaluation     Patient Name: Joe Aviles  Today's Date: 5/14/2024  Problem List  Principal Problem:    Acute ischemic multifocal right-sided anterior circulation stroke (HCC)  Active Problems:    HTN (hypertension)    BPH (benign prostatic hyperplasia)    Dementia (HCC)    Stroke-like symptoms    Past Medical History  Past Medical History:   Diagnosis Date    BPH (benign prostatic hyperplasia)     Dementia (HCC)     Diverticulitis     Hypertension     Macular degeneration     Stroke (HCC)      Past Surgical History  Past Surgical History:   Procedure Laterality Date    CYSTOSCOPY          05/14/24 1000   Note Type   Note type Evaluation   Pain Assessment   Pain Assessment Tool 0-10   Pain Score 2   Pain Location/Orientation Location: Head  (Headache)   Restrictions/Precautions   Other Precautions Chair Alarm;Bed Alarm;Fall Risk   Home Living   Type of Home Mobile home  (4 LEIGHANN B HRs)   Home Layout One level;Performs ADLs on one level;Able to live on main level with bedroom/bathroom   Bathroom Shower/Tub Walk-in shower   Bathroom Toilet Standard   Bathroom Equipment Shower chair  (Is waiting on grab bars for shower, and hopes to get a raised toilet)   Home Equipment Cane;Other (Comment)  (Rollator)   Additional Comments Pt reports living in a mobile home with 4 LEIGHANN B HRs and wasn't using AD for functional mobility PTA.   Prior Function   Level of [place] Independent with ADLs;Independent with functional mobility;Needs assistance with IADLS   Lives With Spouse   Receives Help From Family   IADLs Independent with medication management;Family/Friend/Other provides transportation;Family/Friend/Other provides meals  (Spouse is going to take over with medication management)   Falls in the last 6 months 0   Comments PTA, pt reports independence with ADLs and functional mobility has assistance for IADLs.   General   Family/Caregiver Present Yes  (Spouse)   ADL   UB Dressing Assistance 5   Supervision/Setup   LB Dressing Assistance 5  Supervision/Setup   LB Dressing Deficit Don/doff R sock;Don/doff L sock   Additional Comments Pt able to doff/don the R sock while seated EOB. Overall, UB/LB ADLs with supervision.   Bed Mobility   Supine to Sit 6  Modified independent  (HOB flat, no bedrails)   Additional Comments Pt received supine in bed upon start of session. Pt supine > sit EOB at mod I.   Transfers   Sit to Stand   (SBA)   Additional items Verbal cues   Stand to Sit   (SBA)   Additional items Verbal cues   Stand pivot   (SBA)   Additional items Verbal cues   Additional Comments All functional transfers without AD. Pt sit <> stand with SBA. Pt spt with SBA.   Functional Mobility   Functional Mobility   (SBA)   Additional Comments Pt participated in short functional distance in room, walking to bathroom with SBA and no AD.   Balance   Static Sitting Normal   Dynamic Sitting Good   Static Standing Fair +   Dynamic Standing Fair   Activity Tolerance   Activity Tolerance Patient limited by fatigue   Medical Staff Made Aware PT, Wilfredo   RUE Assessment   RUE Assessment WFL  (Strength grossly 4+/5)   LUE Assessment   LUE Assessment WFL  (Strength grossly 4/5)   Hand Function   Gross Motor Coordination Functional   Fine Motor Coordination Functional  (Mild coordination deficits on the L)   Hand Function Comments Pt is R hand dominant.   Sensation   Light Touch No apparent deficits   Proprioception   Proprioception   (Finger to nose test- mild L dysmetria noted)   Vision-Basic Assessment   Current Vision Wears glasses all the time   Visual History Macular degeneration   Patient Visual Report   (Denies visual changes since stroke)   Vision - Complex Assessment   Saccades   (Mild L eye saccadic movements noted when tracking)   Cognition   Overall Cognitive Status WFL   Arousal/Participation Alert;Cooperative   Attention Within functional limits   Orientation Level Oriented X4   Memory Unable to assess    Following Commands Follows one step commands without difficulty   Comments Dementia noted in chart, however demonstrating cognition that is WFL. Potential memory deficits noted   Assessment   Limitation Decreased ADL status;Decreased UE ROM;Decreased UE strength;Decreased Safe judgement during ADL;Decreased endurance;Visual deficit;Decreased fine motor control;Decreased self-care trans;Decreased high-level ADLs   Prognosis Fair   Assessment Pt is a 83 y.o. male, admitted to [facility] 5/13/2024 d/t experiencing dizziness. Dx: Acute ischemic multifocal R sided anterior circulation stroke. Pt with PMHx impacting their performance during ADL tasks, including: stroke-like symptoms, dementia, BPH, HTN, diverticulitis, macular degeneration, stroke. Prior to admission to the hospital Pt was performing ADLs without physical assistance. IADLs with physical assistance. Functional transfers/ambulation without physical assistance. Cognitive status PTA was WFL. OT order placed to assess Pt's ADLs, cognitive status, and performance during functional tasks in order to maximize safety and independence while making most appropriate d/c recommendations. PT/OT co-evaluation completed at this time d/t significant mobility deficits and safety concerns. Pt's clinical presentation is currently unstable/unpredictable given new onset deficits that affect Pt's occupational performance and ability to safely return to PLOF including decrease activity tolerance, decrease standing balance, decrease performance during ADL tasks, decrease cognition, decrease activity engagement, decrease performance during functional transfers, steps to enter home, limited family support, high fall risk, and limited insight to deficits combined with medical complications of abnormal CBC, abnormal blood sugars, and incontinence.  Personal factors affecting Pt at time of initial evaluation include: step(s) to enter environment, limited home support, advanced age,  inability to perform IADLs, inability to navigate community distances, limited insight into impairments, questionable non-compliance, and tobacco use. Pt will benefit from continued acute skilled OT services to address deficits as defined above and to maximize level independence/participation during ADLs and functional tasks to facilitate return toward PLOF and improved quality of life. Pt is anticipated to progress well towards goals of care. From an occupational therapy standpoint, No Post-Acute Rehabilitation Needs are recommended upon d/c.   Plan   Treatment Interventions ADL retraining;Functional transfer training;UE strengthening/ROM;Endurance training;Patient/family training;Equipment evaluation/education;Neuromuscular reeducation;Fine motor coordination activities;Continued evaluation;Energy conservation;Activityengagement   Goal Expiration Date 05/28/24   OT Treatment Day 1   OT Frequency 1-2x/wk   Discharge Recommendation   Rehab Resource Intensity Level, OT No post-acute rehabilitation needs  (OP PT)   Equipment Recommended (S)  Bedside commode   Commode Type (S)  Standard   AM-PAC Daily Activity Inpatient   Lower Body Dressing 3   Bathing 3   Toileting 3   Upper Body Dressing 3   Grooming 3   Eating 4   Daily Activity Raw Score 19   Daily Activity Standardized Score (Calc for Raw Score >=11) 40.22   AM-PAC Applied Cognition Inpatient   Following a Speech/Presentation 3   Understanding Ordinary Conversation 4   Taking Medications 3   Remembering Where Things Are Placed or Put Away 3   Remembering List of 4-5 Errands 3   Taking Care of Complicated Tasks 2   Applied Cognition Raw Score 18   Applied Cognition Standardized Score 38.07   Additional Treatment Session   Start Time 1020   End Time 1030   Treatment Assessment Pt participated in OT tx session #1 focused on ADLs and functional mobility. Pt alert and agreeable to participate. All functional transfers without AD. Pt participated in functional transfer  to/from standard toilet with SBA. Clothing management up/down with SBA. Pt participating in pericare in standing with SBA. Pt washed/dried B hands while standing at sink with SBA. Pt participated in short functional distance in room with SBA. Pt tolerated treatment fairly but is limited by fatigue. Discussed benefits of placing frame of BSC over toilet as pt and family reporting concerns regarding their toilet at home being too low and without bars. Pt and family understanding and receptive to education. At end of session, pt with PT Wilfredo and all needs met at this time.     The patient's raw score on the AM-PAC Daily Activity Inpatient Short Form is 19. A raw score of greater than or equal to 19 suggests the patient may benefit from discharge to home. Please refer to the recommendation of the Occupational Therapist for safe discharge planning.    Pt goals to be met by 5/28/2024:    Pt will demonstrate ability to complete grooming/hygiene tasks @ mod I after set-up.  Pt will demonstrate ability to complete supine<>sit @ mod I in order to increase safety and independence during ADL tasks.  Pt will demonstrate ability to complete UB ADLs including washing/dressing @ mod I in order to increase performance and participation during meaningful tasks  Pt will demonstrate ability to complete LB dressing @ mod I in order to increase safety and independence during meaningful tasks.   Pt will demonstrate ability to deepti/doff socks/shoes while sitting EOB/chair @ mod I in order to increase safety and independence during meaningful tasks.   Pt will demonstrate ability to complete toileting tasks including CM and pericare @ mod I in order to increase safety and independence during meaningful tasks.  Pt will demonstrate ability to complete EOB, chair, toilet/commode transfers @ mod I in order to increase performance and participation during functional tasks.  Pt will demonstrate ability to stand for 10 minutes while maintaining  F+ balance with use of RW for UB support PRN.  Pt will demonstrate ability to tolerate 30 minute OT session with no vc'ing for deep breathing or use of energy conservation techniques in order to increase activity tolerance during functional tasks.   Pt will demonstrate Good carryover of use of energy conservation/compensatory strategies during ADLs and functional tasks in order to increase safety and reduce risk for falls.   Pt will demonstrate Good attention and participation in continued evaluation of functional ambulation house hold distances in order to assist with safe d/c planning.  Pt will attend to continued cognitive assessments 100% of the time in order to provide most appropriate d/c recommendations.   Pt will follow 100% simple 2-step commands and be A&O x4 consistently with environmental cues to increase participation in functional activities.   Pt will identify 3 areas of interest/hobbies and 1 intervention on how to incorporate into daily life in order to increase interaction with environment and peers as well as increase participation in meaningful tasks.   Pt will demonstrate 100% carryover of BUE HEP in order to increase BUE MS and increase performance during functional tasks upon d/c home.    Errol Anderson MS, OTR/L

## 2024-05-14 NOTE — PLAN OF CARE
Problem: PHYSICAL THERAPY ADULT  Goal: Performs mobility at highest level of function for planned discharge setting.  See evaluation for individualized goals.  Description: Treatment/Interventions: Functional transfer training, LE strengthening/ROM, Elevations, Therapeutic exercise, Endurance training, Patient/family training, Equipment eval/education, Gait training, Compensatory technique education, OT, Spoke to advanced practitioner          See flowsheet documentation for full assessment, interventions and recommendations.  5/14/2024 1136 by Wilfredo Trevizo PT  Outcome: Progressing  Note: Prognosis: Good  Problem List: Decreased strength, Impaired balance, Decreased mobility, Decreased endurance  Pt tolerates tx session fairly well.  Interventions consisting of transfer training, gait training, and stair navigation. Pt noted to have an elevated BP with exertion of 197/105; reports continued HA of 2/10 and mild dizziness of 2/10 as well.  Spouse pleased with pt's current functional mobility status.  Barriers to Discharge: None     Rehab Resource Intensity Level, PT: III (Minimum Resource Intensity)    See flowsheet documentation for full assessment.

## 2024-05-14 NOTE — CONSULTS
Consult Note - Vascular Surgery   [facility]: [phone]    Assessment:  84yo male, current smoker, with PMH of HTN, BPH, thrombocytopenia, dementia. He is currently admitted on stroke pathway after presenting to the ED on 5/13 for left arm weakness, generalized bilateral leg weakness, gait imbalance, and transient left arm numbness. MRI demonstrates multifocal small acute/subacute infarcts on right with high grade bilateral ICA stenosis on CTA. Therefore, vascular was consulted for recommendations.     Imaging-     MRI brain 5/13/24: Multifocal small acute/subacute infarcts in right frontal, right parietal, right temporal, right occipital lobes and right basal ganglia. Chronic small infarcts in right frontal, right parietal, and right occipital lobes.Moderate chronic microangiopathy.    CTA head and neck 5/13/24: Markedly hypoplastic right vertebral artery appears to terminate in distal V3/proximal V4 segment. This may be developmental over chronic occlusion.  - Severe stenosis (approximately 83% narrowing) in bilateral ICA proximal cervical segments due to predominantly noncalcified atherosclerotic disease.     CV duplex pending     Plan:  -Multifocal acute infarcts throughout right MCA territory. No focal neuro deficits on exam today per [facility]. Left hand weakness resolved.   -CTA head and neck demonstrate bilateral high grade proximal ICA stenosis; approximately 80%.   -CV duplex pending to assess ICA velocities.  -Neuro following. Suspect symptomatic R ICA stenosis. On aspirin 81 mg and loaded with plavix in ED.   -Patient will likely benefit from right carotid revascularization (CEA vs TCAR) for further stroke prevention. Per neuro, ideally intervention should be performed in next 1-2 weeks. Surgery would have to be done at [facility] or [facility].   -Patient and wife unsure whether they would like to consider surgical intervention at this time.   -Will ask for cardiac risk stratification prior to vascular  surgery. TTE showed EF 60% with mild concentric hypertrophy. Cardiology consulted.   -Continue DAPT, statin.  -BP control per neuro recs.   -Complete smoking cessation.   -Further recommendations forthcoming pending CV duplex, cardiac evaluation, and further discussions with patient and family.   -PT/OT.   -D/w KAROLINE FIERRO.  -Will d/w Dr. Anaya.    *Patient was not examined by myself. Total of 25 minutes spent reviewing the chart, clinical images, diagnostics imaging studies and discussing with team.     ______________________________________________________________________    Consulting Service: MESSI      HPI: Joe Aviles is a 83 y.o. male, current smoker, with PMH of HTN, BPH, thrombocytopenia, dementia. He is currently admitted on stroke pathway after presenting to the ED on 5/13 for left arm weakness, generalized bilateral leg weakness, gait imbalance, and transient left arm numbness. He was recently seen at [facility] ED on 5/9 for weakness and gait dysfunction. Work up at that time showed age in-determinate strokes on the right and bilateral carotid stenosis. He was recommended for admission, however left AMA. MRI demonstrates multifocal small acute/subacute infarcts on right with high grade bilateral ICA stenosis on CTA. Therefore, vascular was consulted for recommendations.    Per [facility], patient is neuro intact. Left hand weakness has resolved and has good  strength in LUE. He is alert and oriented and answers questions appropriately. No evidence of facial asymmetry or droop.     He is on aspirin 81 mg at home.     He is a current smoker.     No prior history of vascular interventions.         Past Medical History:  Past Medical History:   Diagnosis Date    BPH (benign prostatic hyperplasia)     Dementia (HCC)     Diverticulitis     Hypertension     Macular degeneration     Stroke (HCC)        Past Surgical History:  Past Surgical History:   Procedure Laterality Date    CYSTOSCOPY         Social  History:  Social History     Substance and Sexual Activity   Alcohol Use Not Currently     Social History     Substance and Sexual Activity   Drug Use No     Social History     Tobacco Use   Smoking Status Every Day    Current packs/day: 0.50    Types: Cigarettes   Smokeless Tobacco Never       Family History:  History reviewed. No pertinent family history.    Allergies:  No Known Allergies    Medications:  Current Facility-Administered Medications   Medication Dose Route Frequency    acetaminophen (TYLENOL) tablet 650 mg  650 mg Oral Q4H PRN    aspirin chewable tablet 81 mg  81 mg Oral Daily    atorvastatin (LIPITOR) tablet 40 mg  40 mg Oral QPM    clopidogrel (PLAVIX) tablet 75 mg  75 mg Oral Daily    donepezil (ARICEPT) tablet 10 mg  10 mg Oral HS    heparin (porcine) subcutaneous injection 5,000 Units  5,000 Units Subcutaneous Q8H FAUSTO    hydrALAZINE (APRESOLINE) injection 20 mg  20 mg Intravenous Once    hydrALAZINE (APRESOLINE) injection 5 mg  5 mg Intravenous Q6H PRN    LORazepam (ATIVAN) injection 1 mg  1 mg Intravenous Q6H PRN    memantine (NAMENDA) tablet 10 mg  10 mg Oral Daily    nicotine (NICODERM CQ) 14 mg/24hr TD 24 hr patch 1 patch  1 patch Transdermal Daily    tamsulosin (FLOMAX) capsule 0.4 mg  0.4 mg Oral Daily With Dinner       Vitals:  Vitals:    05/14/24 1028   BP: (!) 197/105   Pulse: 98   Resp:    Temp:    SpO2: 96%       I/Os:  I/O last 3 completed shifts:  In: 240 [P.O.:240]  Out: -   I/O this shift:  In: 765 [P.O.:765]  Out: -     Lab Results and Cultures:   CBC with diff:   Lab Results   Component Value Date    WBC 8.53 05/14/2024    HGB 14.0 05/14/2024    HCT 41.3 05/14/2024    MCV 94 05/14/2024     (L) 05/14/2024    RBC 4.40 05/14/2024    MCH 31.8 05/14/2024    MCHC 33.9 05/14/2024    RDW 13.3 05/14/2024    MPV 11.9 05/14/2024    NRBC 0 05/14/2024   ,   BMP/CMP:  Lab Results   Component Value Date    K 3.5 05/14/2024    K 3.8 05/09/2024     (H) 05/14/2024     (H)  "05/09/2024    CO2 26 05/14/2024    CO2 27 05/09/2024    BUN 17 05/14/2024    BUN 14 05/09/2024    CREATININE 0.98 05/14/2024    CREATININE 0.98 05/09/2024    CALCIUM 8.7 05/14/2024    CALCIUM 8.6 05/09/2024    AST 16 05/13/2024    AST 19 05/09/2024    ALT 17 05/13/2024    ALT 17 05/09/2024    ALKPHOS 102 05/13/2024    ALKPHOS 81 05/09/2024    EGFR 72 05/14/2024    EGFR 77 05/09/2024    EGFR 69 12/11/2019   ,   Lipid Panel: No results found for: \"CHOL\",   Coags:   Lab Results   Component Value Date    PT 13.6 05/09/2024    PTT 35 05/13/2024    INR 0.98 05/13/2024    INR 1.0 05/09/2024   ,     Blood Culture: No results found for: \"BLOODCX\",   Urinalysis:   Lab Results   Component Value Date    COLORU yellow 10/19/2021    CLARITYU clear 10/19/2021    LEUKOCYTESUR neg 10/19/2021    NITRITE neg 10/19/2021    GLUCOSEU neg 10/19/2021    KETONESU neg 10/19/2021    BILIRUBINUR neg 10/19/2021    BLOODU trace 10/19/2021   ,   Urine Culture: No results found for: \"URINECX\",   Wound Culure: No results found for: \"WOUNDCULT\"    Imaging:  See above     Patient was not examined by myself. Total of 25 minutes spent reviewing the chart, clinical images, diagnostics imaging studies and discussing with team.         Zuleyka Burgos PA-C  5/14/2024      "

## 2024-05-14 NOTE — TELEPHONE ENCOUNTER
From: Zuleyka Burgos <[email or web address]>  Sent: Tuesday, May 14, 2024 3:27 PM  To: Any Kaye <[email or web address]>  Subject: Re: Joe Fontanaquinten 1940     Yes, more than likely discharge/readmit. I will let you know if he is agreeable to place him on the schedule. I think 5/22 with Dr. Barajas is fine.     Harriet   From: Any Kaye <[email or web address]>  Sent: Tuesday, May 14, 2024 3:14 PM  To: Zuleyka Burgos <[email or web address]>; Vascular Surgery Schedulers <[email or web address]>  Subject: Re: Joe oFntanaquinten 1940     Hi there,     Patient could go on 5/22 at [facility] with [facility] or 5/24/24 [facility] with [facility]. Would he be getting discharged prior?    Thanks    Any Kaye, Surgery Coordinator

## 2024-05-14 NOTE — PLAN OF CARE
Problem: OCCUPATIONAL THERAPY ADULT  Goal: Performs self-care activities at highest level of function for planned discharge setting.  See evaluation for individualized goals.  Description: Treatment Interventions: ADL retraining, Functional transfer training, UE strengthening/ROM, Endurance training, Patient/family training, Equipment evaluation/education, Neuromuscular reeducation, Fine motor coordination activities, Continued evaluation, Energy conservation, Activityengagement  Equipment Recommended: (S) Bedside commode       See flowsheet documentation for full assessment, interventions and recommendations.   Outcome: Progressing  Note: Limitation: Decreased ADL status, Decreased UE ROM, Decreased UE strength, Decreased Safe judgement during ADL, Decreased endurance, Visual deficit, Decreased fine motor control, Decreased self-care trans, Decreased high-level ADLs  Prognosis: Fair  Assessment: Pt is a 83 y.o. male, admitted to [facility]/13/2024 d/t experiencing dizziness. Dx: Acute ischemic multifocal R sided anterior circulation stroke. Pt with PMHx impacting their performance during ADL tasks, including: stroke-like symptoms, dementia, BPH, HTN, diverticulitis, macular degeneration, stroke. Prior to admission to the hospital Pt was performing ADLs without physical assistance. IADLs with physical assistance. Functional transfers/ambulation without physical assistance. Cognitive status PTA was WFL. OT order placed to assess Pt's ADLs, cognitive status, and performance during functional tasks in order to maximize safety and independence while making most appropriate d/c recommendations. PT/OT co-evaluation completed at this time d/t significant mobility deficits and safety concerns. Pt's clinical presentation is currently unstable/unpredictable given new onset deficits that affect Pt's occupational performance and ability to safely return to OF including decrease activity tolerance, decrease standing balance,  decrease performance during ADL tasks, decrease cognition, decrease activity engagement, decrease performance during functional transfers, steps to enter home, limited family support, high fall risk, and limited insight to deficits combined with medical complications of abnormal CBC, abnormal blood sugars, and incontinence.  Personal factors affecting Pt at time of initial evaluation include: step(s) to enter environment, limited home support, advanced age, inability to perform IADLs, inability to navigate community distances, limited insight into impairments, questionable non-compliance, and tobacco use. Pt will benefit from continued acute skilled OT services to address deficits as defined above and to maximize level independence/participation during ADLs and functional tasks to facilitate return toward PLOF and improved quality of life. Pt is anticipated to progress well towards goals of care. From an occupational therapy standpoint, No Post-Acute Rehabilitation Needs are recommended upon d/c.     Rehab Resource Intensity Level, OT: No post-acute rehabilitation needs (OP PT)

## 2024-05-14 NOTE — PROGRESS NOTES
Vascular Surgery Note-     Plan discussed with [facility] AP, patient, and patients wife, Shana, over the phone.     Patient agreeable to proceeding with right carotid intervention. He is currently scheduled for 5/22 at [facility] with Dr. Barajas. He will be discharged and readmitted for the procedure.     Cardiology consulted for risk stratification (P)    Do not stop ASA, plavix prior to surgery.     Patient and wife would like short interval follow up in the office with Dr. Barajas prior to surgery. This was arranged for 5/21.     All questions and concerns were addressed today.     Zuleyka Burgos PA-C  [facility]  [phone]

## 2024-05-14 NOTE — PLAN OF CARE
Problem: Potential for Falls  Goal: Patient will remain free of falls  Description: INTERVENTIONS:  - Educate patient/family on patient safety including physical limitations  - Instruct patient to call for assistance with activity   - Consult OT/PT to assist with strengthening/mobility   - Keep Call bell within reach  - Keep bed low and locked with side rails adjusted as appropriate  - Keep care items and personal belongings within reach  - Initiate and maintain comfort rounds  - Make Fall Risk Sign visible to staff    - Initiate/Maintain bed alarm  - Obtain necessary fall risk management equipment: bed alarm  - Apply yellow socks and bracelet for high fall risk patients  - Consider moving patient to room near nurses station  Outcome: Progressing     Problem: PAIN - ADULT  Goal: Verbalizes/displays adequate comfort level or baseline comfort level  Description: Interventions:  - Encourage patient to monitor pain and request assistance  - Assess pain using appropriate pain scale  - Administer analgesics based on type and severity of pain and evaluate response  - Implement non-pharmacological measures as appropriate and evaluate response  - Consider cultural and social influences on pain and pain management  - Notify physician/advanced practitioner if interventions unsuccessful or patient reports new pain  Outcome: Progressing     Problem: SAFETY ADULT  Goal: Patient will remain free of falls  Description: INTERVENTIONS:  - Educate patient/family on patient safety including physical limitations  - Instruct patient to call for assistance with activity   - Consult OT/PT to assist with strengthening/mobility   - Keep Call bell within reach  - Keep bed low and locked with side rails adjusted as appropriate  - Keep care items and personal belongings within reach  - Initiate and maintain comfort rounds  - Make Fall Risk Sign visible to staff  - Offer Toileting every 2 Hours, in advance of need  - Initiate/Maintain bed  alarm  - Obtain necessary fall risk management equipment: bed alarm  - Apply yellow socks and bracelet for high fall risk patients  - Consider moving patient to room near nurses station  Outcome: Progressing  Goal: Maintain or return to baseline ADL function  Description: INTERVENTIONS:  -  Assess patient's ability to carry out ADLs; assess patient's baseline for ADL function and identify physical deficits which impact ability to perform ADLs (bathing, care of mouth/teeth, toileting, grooming, dressing, etc.)  - Assess/evaluate cause of self-care deficits   - Assess range of motion  - Assess patient's mobility; develop plan if impaired  - Assess patient's need for assistive devices and provide as appropriate  - Encourage maximum independence but intervene and supervise when necessary  - Involve family in performance of ADLs  - Assess for home care needs following discharge   - Consider OT consult to assist with ADL evaluation and planning for discharge  - Provide patient education as appropriate  Outcome: Progressing  Goal: Maintains/Returns to pre admission functional level  Description: INTERVENTIONS:  - Perform AM-PAC 6 Click Basic Mobility/ Daily Activity assessment daily.  - Set and communicate daily mobility goal to care team and patient/family/caregiver.   - Collaborate with rehabilitation services on mobility goals if consulted    - Out of bed for toileting  - Record patient progress and toleration of activity level   Outcome: Progressing     Problem: DISCHARGE PLANNING  Goal: Discharge to home or other facility with appropriate resources  Description: INTERVENTIONS:  - Identify barriers to discharge w/patient and caregiver  - Arrange for needed discharge resources and transportation as appropriate  - Identify discharge learning needs (meds, wound care, etc.)  - Arrange for interpretive services to assist at discharge as needed  - Refer to Case Management Department for coordinating discharge planning if the  patient needs post-hospital services based on physician/advanced practitioner order or complex needs related to functional status, cognitive ability, or social support system  Outcome: Progressing     Problem: Knowledge Deficit  Goal: Patient/family/caregiver demonstrates understanding of disease process, treatment plan, medications, and discharge instructions  Description: Complete learning assessment and assess knowledge base.  Interventions:  - Provide teaching at level of understanding  - Provide teaching via preferred learning methods  Outcome: Progressing     Problem: Prexisting or High Potential for Compromised Skin Integrity  Goal: Skin integrity is maintained or improved  Description: INTERVENTIONS:  - Identify patients at risk for skin breakdown  - Assess and monitor skin integrity  - Assess and monitor nutrition and hydration status  - Monitor labs   - Assess for incontinence   - Turn and reposition patient  - Assist with mobility/ambulation  - Relieve pressure over bony prominences  - Avoid friction and shearing  - Provide appropriate hygiene as needed including keeping skin clean and dry  - Evaluate need for skin moisturizer/barrier cream  - Collaborate with interdisciplinary team   - Patient/family teaching  - Consider wound care consult   Outcome: Progressing     Problem: Neurological Deficit  Goal: Neurological status is stable or improving  Description: Interventions:  - Monitor and assess patient's level of consciousness, motor function, sensory function, and level of assistance needed for ADLs.   - Monitor and report changes from baseline. Collaborate with interdisciplinary team to initiate plan and implement interventions as ordered.   - Provide and maintain a safe environment.  - Consider seizure precautions.  - Consider fall precautions.  - Consider aspiration precautions.  - Consider bleeding precautions.  Outcome: Progressing     Problem: Activity Intolerance/Impaired Mobility  Goal:  Mobility/activity is maintained at optimum level for patient  Description: Interventions:  - Assess and monitor patient  barriers to mobility and need for assistive/adaptive devices.  - Assess patient's emotional response to limitations.  - Collaborate with interdisciplinary team and initiate plans and interventions as ordered.  - Encourage independent activity per ability.  - Maintain proper body alignment.  - Perform active/passive rom as tolerated/ordered.  - Plan activities to conserve energy.  - Turn patient as appropriate  Outcome: Progressing     Problem: Communication Impairment  Goal: Ability to express needs and understand communication  Description: Assess patient's communication skills and ability to understand information.  Patient will demonstrate use of effective communication techniques, alternative methods of communication and understanding even if not able to speak.     - Encourage communication and provide alternate methods of communication as needed.  - Collaborate with case management/ for discharge needs.  - Include patient/family/caregiver in decisions related to communication.  Outcome: Progressing     Problem: Potential for Aspiration  Goal: Non-ventilated patient's risk of aspiration is minimized  Description: Assess and monitor vital signs, respiratory status, and labs (WBC).  Monitor for signs of aspiration (tachypnea, cough, rales, wheezing, cyanosis, fever).    - Assess and monitor patient's ability to swallow.  - Place patient up in chair to eat if possible.  - HOB up at 90 degrees to eat if unable to get patient up into chair.  - Supervise patient during oral intake.   - Instruct patient/ family to take small bites.  - Instruct patient/ family to take small single sips when taking liquids.  - Follow patient-specific strategies generated by speech pathologist.  Outcome: Progressing  Goal: Ventilated patient's risk of aspiration is minimized  Description: Assess and  monitor vital signs, respiratory status, airway cuff pressure, and labs (WBC).  Monitor for signs of aspiration (tachypnea, cough, rales, wheezing, cyanosis, fever).    - Elevate head of bed 30 degrees if patient has tube feeding.  - Monitor tube feeding.  Outcome: Progressing     Problem: Nutrition  Goal: Nutrition/Hydration status is improving  Description: Monitor and assess patient's nutrition/hydration status for malnutrition (ex- brittle hair, bruises, dry skin, pale skin and conjunctiva, muscle wasting, smooth red tongue, and disorientation). Collaborate with interdisciplinary team and initiate plan and interventions as ordered.  Monitor patient's weight and dietary intake as ordered or per policy. Utilize nutrition screening tool and intervene per policy. Determine patient's food preferences and provide high-protein, high-caloric foods as appropriate.     - Assist patient with eating.  - Allow adequate time for meals.  - Encourage patient to take dietary supplement as ordered.  - Collaborate with clinical nutritionist.  - Include patient/family/caregiver in decisions related to nutrition.  Outcome: Progressing

## 2024-05-14 NOTE — DISCHARGE SUMMARY
"[facility]  Discharge- Joe Cowartgee 1940, 83 y.o. male MRN: [id number]  Unit/Bed#: -[facility] Encounter: [id number]  Primary Care Provider: Jayme Wolff MD   Date and time admitted to hospital: 5/13/2024 11:41 AM    * Stroke-like symptoms  Assessment & Plan  84 yo M with PMH of HTN who initally presented with diizziness. He was diagnosed on CTA with multiple indeterminate strokes 4 days ago but left ama at that time.  CTA revealed \" Hypodensities indicating age-indeterminate infarction in the right basal ganglia, new from the prior. Small chronic infarct in the right Caudate; Severe stenosis (approximately 83% narrowing) in bilateral ICA proximal cervical segments due to predominantly noncalcified atherosclerotic disease.\"  Will admit for stroke pathway  MRI brain: Multifocal small acute/subacute infarcts in right frontal, right parietal, right temporal, right occipital lobes and right basal ganglia. Chronic small infarcts in right frontal, right parietal, and right occipital lobes.  Asa,plavix, statin  Echo WNL  Pt/ot eval  Telemetry monitoring  Neuro consult: recommending CUS and vascular surgery consult, continue ASA, load with plavix 300 mg then 75 mg daily for DAPT, start lipitor 40 mg qd, follow up with neurology in 6 weeks.  Carotid artery ultrasound also with significant stenosis  Vascular surgery consult: Patient will likely benefit from right carotid revascularization (CEA vs TCAR) for further stroke prevention. Per neuro, ideally intervention should be performed in next 1-2 weeks. Surgery would have to be done at [facility] or [facility]. Will ask for cardiac risk stratification prior to vascular surgery. TTE showed EF 60% with mild concentric hypertrophy.   Patient would like to be discharged today and outpatient follow up with cardiology clearance and outpatient vascular surgery follow up prior to surgery. Follow up with cardiology for appointment on Friday 5/17 for cardiac " clearance and vascular surgery on 5/21 prior to operation on 5/22. Continue with ASA and plavix prior to surgery.   Instructed to return to hospital if any new or worsening symptoms verbalized understanding.   Symptoms resolved. Continue ASA, plavix, lipitor on discharge. Follow up with cards, neurology, vascular surgery.     Dementia (HCC)  Assessment & Plan  C/w aricept  Mental status at baseline   Continue to monitor    BPH (benign prostatic hyperplasia)  Assessment & Plan  C/w flomax    HTN (hypertension)  Assessment & Plan  No need for permissive HTN, continue home BP meds.   Continue on discharge       Medical Problems       Resolved Problems  Date Reviewed: 5/14/2024   None       Discharging Physician / Practitioner: Louise Stringer PA-C  PCP: Jayme Wolff MD  Admission Date:   Admission Orders (From admission, onward)       Ordered        05/13/24 1302  INPATIENT ADMISSION  Once                          Discharge Date: 05/14/24    Consultations During Hospital Stay:  Neurology, vascular surgery, pt/ot/speech    Procedures Performed:   none    Significant Findings / Test Results:   VAS carotid complete study   Final Result by Chelo Matthews MD (05/14 9014)      MRI brain wo contrast   Final Result by Soren Angeles MD (05/13 8025)      Multifocal small acute/subacute infarcts in right frontal, right parietal, right temporal, right occipital lobes and right basal ganglia.      Chronic small infarcts in right frontal, right parietal, and right occipital lobes.      Moderate chronic microangiopathy.      The study was marked in EPIC for immediate notification.            Workstation performed: [id number]         CTA head and neck with and without contrast   Final Result by Soren Angeles MD (05/13 9565)      CT head   - Age-indeterminate small infarcts in right basal ganglia and right occipital lobe. Consider MRI brain without contrast for further evaluation.   - Mild chronic  microangiopathy.      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 This may be developmental over chronic occlusion.   - No other sites of large vessel occlusion in the head or neck.   - Severe stenosis (approximately 83% narrowing) in bilateral ICA proximal cervical segments due to predominantly noncalcified atherosclerotic disease. Recommend vascular surgery consultation for further revaluation.   - Negative CTA head and neck for dissection or aneurysm.      Additional chronic/incidental findings as detailed above.         I personally discussed this study with MARCUS OCONNOR on 5/13/2024 12:44 PM.                                 Workstation performed: [id number]           VAS carotid artery duplex: RIGHT: There is >70% stenosis noted in the internal carotid artery. Plaque is heterogenous and irregular. Vertebral artery flow is antegrade. There is no significant subclavian artery disease. LEFT: There is 70-99% stenosis noted in the internal carotid artery. Plaque is heterogenous and irregular. Vertebral artery flow is antegrade. There is no significant subclavian artery disease.  Echo: Left Ventricle: Left ventricular cavity size is normal. There is mild concentric hypertrophy. The left ventricular ejection fraction is 60% by biplane measurement. Systolic function is normal. Although no diagnostic regional wall motion abnormality was identified, this possibility cannot be completely excluded on the basis of this study. Diastolic function is mildly abnormal, consistent with grade I (abnormal) relaxation. Atrial Septum: No patent foramen ovale detected, confirmed at rest using agitated saline contrast, confirmed by provocation with cough, using agitated saline contrast and with valsalva, using agitated saline contrast.  IVC/SVC: The right atrial pressure is estimated at 8.0 mmHg. The inferior vena cava is normal in size. Respirophasic  changes were blunted (less than 50% variation).  A1c: 6.3  Lipid panel: cholesterol: 111, triglycerides: 129, HDL: 26, LDL: 59  Troponin normal    Incidental Findings:   See above   I reviewed the above mentioned incidental findings with the patient and/or family and they expressed understanding.    Test Results Pending at Discharge (will require follow up):   none     Outpatient Tests Requested:  Further testing to be done per cardiology for cardiac clearance  Follow up with PCP in 1 week  Follow up with cardiology on Friday  Follow up with vascular surgery  Follow up with vascular surgery for CEA on 5/22  Follow up with neurology in 6 weeks    Complications:  none    Reason for Admission: stroke like symptoms    Hospital Course:   Joe Aviles is a 83 y.o. male patient who originally presented to the hospital on 5/13/2024 due to dizziness. Was diagnosed with a stroke outpatient at [facility] 4 days prior and left AMA after declining admission. Returned due to feeling off again and having recurrent symptoms. In the ED, a CTA head/neck was done and results above. Neurology consulted and recommended admission for stroke workup. MRI brain with results above. Neurology also recommended obtaining TTE, CUS, vascular surgery consult, and have carotid revascularization within 1-2 weeks if deemed surgical candidate. Recommended plavix load along with continuing DAPT. Carotid ultrasound with results above. Vascular surgery consulted and recommended that patient would benefit from right carotid revascularization. Recommended to be done within 1-2 weeks. Should have cardiac clearance prior to procedure. Attempted to have this coordinated inpatient, but unable to be done on day of discharge and patient did not want to stay overnight to have cardiology evaluate inpatient on 5/15. Was able to schedule for outpatient cardiology appointment for Friday 5/17 for cardiac clearance. Patient initially was unsure regarding vascular surgery  "intervention, but decided he would like to pursue at this time. Vascular surgery scheduled outpatient appointment to discuss procedure on 5/21 with patient scheduled for procedure on 5/22. Should continue with ASA and plavix prior to appointment. Continue with plavix. With stroke workup, PT/OT evaluated and recommended outpatient PT/OT. Speech saw patient and no speech needs. Should follow up with PCP in 1 week. Follow up with cardiology for cardiac clearance. Should follow up with neurology in 6 weeks. Follow up with vascular surgery for vascular intervention. Instructed patient to continue taking medications as prescribed. Instructed patient to return to the ED if he has any new or worsening symptoms. He verbalized understanding and agreement to the plans above.     The patient, initially admitted to the hospital as inpatient, was discharged earlier than expected given the following: completion of stroke workup, resolution of symptoms.    Please see above list of diagnoses and related plan for additional information.     Condition at Discharge: fair    Discharge Day Visit / Exam:   Subjective:  Seen and examined today. He said that he is feeling great currently. His symptoms have resolved. He said that he is glad that he came to the hospital and had the workup done. He is upset that he had the stroke, but he is glad that he had the extra testing to show he has severe carotid stenosis. He is agreeable to outpatient vascular surgery and cardiac clearance prior to this. No further weakness in the left upper extremity. No dizziness, lightheadedness, nausea, vomiting, diarrhea, constipation, abdominal pain, CP, SOB, fever, chills.   Vitals: Blood Pressure: 157/92 (05/14/24 1315)  Pulse: 92 (05/14/24 1315)  Temperature: (!) 97.2 °F (36.2 °C) (05/14/24 1315)  Temp Source: Temporal (05/14/24 1315)  Respirations: 18 (05/14/24 1315)  Height: 5' 9\" (175.3 cm) (05/13/24 1415)  Weight - Scale: 77.1 kg (170 lb) (05/13/24 " 1415)  SpO2: 94 % (05/14/24 1315)  Exam:   Physical Exam  Vitals reviewed.   Constitutional:       General: He is not in acute distress.     Appearance: He is not ill-appearing or toxic-appearing.   HENT:      Head: Normocephalic and atraumatic.      Mouth/Throat:      Mouth: Mucous membranes are moist.   Eyes:      General: No visual field deficit.  Cardiovascular:      Rate and Rhythm: Normal rate and regular rhythm.      Heart sounds: No murmur heard.  Pulmonary:      Effort: No respiratory distress.      Breath sounds: No stridor. No wheezing, rhonchi or rales.      Comments: Saturating well on room air  Abdominal:      General: Bowel sounds are normal. There is no distension.      Palpations: Abdomen is soft. There is no mass.      Tenderness: There is no abdominal tenderness.   Musculoskeletal:      Right lower leg: No edema.      Left lower leg: No edema.   Skin:     General: Skin is warm and dry.   Neurological:      General: No focal deficit present.      Mental Status: He is alert and oriented to person, place, and time.      Cranial Nerves: No cranial nerve deficit, dysarthria or facial asymmetry.      Sensory: Sensation is intact. No sensory deficit.      Motor: No weakness, tremor or pronator drift.      Comments: Answering all orientation questions appropriately, completely normal neuro exam.  strength equal bilaterally. Patient states symptoms completely resolved.    Psychiatric:         Mood and Affect: Mood normal.         Behavior: Behavior normal.         Discussion with Family: Updated  (wife) at bedside.    Discharge instructions/Information to patient and family:   See after visit summary for information provided to patient and family.      Provisions for Follow-Up Care:  See after visit summary for information related to follow-up care and any pertinent home health orders.      Mobility at time of Discharge:   Basic Mobility Inpatient Raw Score: 19  -[facility] Goal: 6: Walk 10  steps or more  JH-HLM Achieved: 7: Walk 25 feet or more  HLM Goal achieved. Continue to encourage appropriate mobility.     Disposition:   Home    Planned Readmission: yes for CEA with vascular surgery     Discharge Statement:  I spent 65 minutes discharging the patient. This time was spent on the day of discharge. I had direct contact with the patient on the day of discharge. Greater than 50% of the total time was spent examining patient, answering all patient questions, arranging and discussing plan of care with patient as well as directly providing post-discharge instructions.  Additional time then spent on discharge activities.    Discharge Medications:  See after visit summary for reconciled discharge medications provided to patient and/or family.      **Please Note: This note may have been constructed using a voice recognition system**

## 2024-05-14 NOTE — PLAN OF CARE
Problem: Potential for Falls  Goal: Patient will remain free of falls  Description: INTERVENTIONS:  - Educate patient/family on patient safety including physical limitations  - Instruct patient to call for assistance with activity   - Consult OT/PT to assist with strengthening/mobility   - Keep Call bell within reach  - Keep bed low and locked with side rails adjusted as appropriate  - Keep care items and personal belongings within reach  - Initiate and maintain comfort rounds  - Make Fall Risk Sign visible to staff  - Offer Toileting every 2 Hours, in advance of need  - Initiate/Maintain bed alarm  - Apply yellow socks and bracelet for high fall risk patients  - Consider moving patient to room near nurses station  Outcome: Progressing     Problem: PAIN - ADULT  Goal: Verbalizes/displays adequate comfort level or baseline comfort level  Description: Interventions:  - Encourage patient to monitor pain and request assistance  - Assess pain using appropriate pain scale  - Administer analgesics based on type and severity of pain and evaluate response  - Implement non-pharmacological measures as appropriate and evaluate response  - Consider cultural and social influences on pain and pain management  - Notify physician/advanced practitioner if interventions unsuccessful or patient reports new pain  Outcome: Progressing     Problem: SAFETY ADULT  Goal: Patient will remain free of falls  Description: INTERVENTIONS:  - Educate patient/family on patient safety including physical limitations  - Instruct patient to call for assistance with activity   - Consult OT/PT to assist with strengthening/mobility   - Keep Call bell within reach  - Keep bed low and locked with side rails adjusted as appropriate  - Keep care items and personal belongings within reach  - Initiate and maintain comfort rounds  - Make Fall Risk Sign visible to staff  - Apply yellow socks and bracelet for high fall risk patients  - Consider moving patient to  room near nurses station  Outcome: Progressing  Goal: Maintain or return to baseline ADL function  Description: INTERVENTIONS:  -  Assess patient's ability to carry out ADLs; assess patient's baseline for ADL function and identify physical deficits which impact ability to perform ADLs (bathing, care of mouth/teeth, toileting, grooming, dressing, etc.)  - Assess/evaluate cause of self-care deficits   - Assess range of motion  - Assess patient's mobility; develop plan if impaired  - Assess patient's need for assistive devices and provide as appropriate  - Encourage maximum independence but intervene and supervise when necessary  - Involve family in performance of ADLs  - Assess for home care needs following discharge   - Consider OT consult to assist with ADL evaluation and planning for discharge  - Provide patient education as appropriate  Outcome: Progressing  Goal: Maintains/Returns to pre admission functional level  Description: INTERVENTIONS:  - Perform AM-PAC 6 Click Basic Mobility/ Daily Activity assessment daily.  - Set and communicate daily mobility goal to care team and patient/family/caregiver.   - Collaborate with rehabilitation services on mobility goals if consulted  - Perform Range of Motion 2 times a day.  - Reposition patient every 2 hours.  - Dangle patient 2 times a day  - Stand patient 2 times a day  - Ambulate patient 2 times a day  - Out of bed to chair 2 times a day   - Out of bed for meals 2 times a day  - Out of bed for toileting  - Record patient progress and toleration of activity level   Outcome: Progressing     Problem: DISCHARGE PLANNING  Goal: Discharge to home or other facility with appropriate resources  Description: INTERVENTIONS:  - Identify barriers to discharge w/patient and caregiver  - Arrange for needed discharge resources and transportation as appropriate  - Identify discharge learning needs (meds, wound care, etc.)  - Arrange for interpretive services to assist at discharge as  needed  - Refer to Case Management Department for coordinating discharge planning if the patient needs post-hospital services based on physician/advanced practitioner order or complex needs related to functional status, cognitive ability, or social support system  Outcome: Progressing     Problem: Knowledge Deficit  Goal: Patient/family/caregiver demonstrates understanding of disease process, treatment plan, medications, and discharge instructions  Description: Complete learning assessment and assess knowledge base.  Interventions:  - Provide teaching at level of understanding  - Provide teaching via preferred learning methods  Outcome: Progressing     Problem: Prexisting or High Potential for Compromised Skin Integrity  Goal: Skin integrity is maintained or improved  Description: INTERVENTIONS:  - Identify patients at risk for skin breakdown  - Assess and monitor skin integrity  - Assess and monitor nutrition and hydration status  - Monitor labs   - Assess for incontinence   - Turn and reposition patient  - Assist with mobility/ambulation  - Relieve pressure over bony prominences  - Avoid friction and shearing  - Provide appropriate hygiene as needed including keeping skin clean and dry  - Evaluate need for skin moisturizer/barrier cream  - Collaborate with interdisciplinary team   - Patient/family teaching  - Consider wound care consult   Outcome: Progressing     Problem: Neurological Deficit  Goal: Neurological status is stable or improving  Description: Interventions:  - Monitor and assess patient's level of consciousness, motor function, sensory function, and level of assistance needed for ADLs.   - Monitor and report changes from baseline. Collaborate with interdisciplinary team to initiate plan and implement interventions as ordered.   - Provide and maintain a safe environment.  - Consider seizure precautions.  - Consider fall precautions.  - Consider aspiration precautions.  - Consider bleeding  precautions.  Outcome: Progressing     Problem: Activity Intolerance/Impaired Mobility  Goal: Mobility/activity is maintained at optimum level for patient  Description: Interventions:  - Assess and monitor patient  barriers to mobility and need for assistive/adaptive devices.  - Assess patient's emotional response to limitations.  - Collaborate with interdisciplinary team and initiate plans and interventions as ordered.  - Encourage independent activity per ability.  - Maintain proper body alignment.  - Perform active/passive rom as tolerated/ordered.  - Plan activities to conserve energy.  - Turn patient as appropriate  Outcome: Progressing     Problem: Communication Impairment  Goal: Ability to express needs and understand communication  Description: Assess patient's communication skills and ability to understand information.  Patient will demonstrate use of effective communication techniques, alternative methods of communication and understanding even if not able to speak.     - Encourage communication and provide alternate methods of communication as needed.  - Collaborate with case management/ for discharge needs.  - Include patient/family/caregiver in decisions related to communication.  Outcome: Progressing     Problem: Potential for Aspiration  Goal: Non-ventilated patient's risk of aspiration is minimized  Description: Assess and monitor vital signs, respiratory status, and labs (WBC).  Monitor for signs of aspiration (tachypnea, cough, rales, wheezing, cyanosis, fever).    - Assess and monitor patient's ability to swallow.  - Place patient up in chair to eat if possible.  - HOB up at 90 degrees to eat if unable to get patient up into chair.  - Supervise patient during oral intake.   - Instruct patient/ family to take small bites.  - Instruct patient/ family to take small single sips when taking liquids.  - Follow patient-specific strategies generated by speech pathologist.  Outcome:  Progressing  Goal: Ventilated patient's risk of aspiration is minimized  Description: Assess and monitor vital signs, respiratory status, airway cuff pressure, and labs (WBC).  Monitor for signs of aspiration (tachypnea, cough, rales, wheezing, cyanosis, fever).    - Elevate head of bed 30 degrees if patient has tube feeding.  - Monitor tube feeding.  Outcome: Progressing     Problem: Nutrition  Goal: Nutrition/Hydration status is improving  Description: Monitor and assess patient's nutrition/hydration status for malnutrition (ex- brittle hair, bruises, dry skin, pale skin and conjunctiva, muscle wasting, smooth red tongue, and disorientation). Collaborate with interdisciplinary team and initiate plan and interventions as ordered.  Monitor patient's weight and dietary intake as ordered or per policy. Utilize nutrition screening tool and intervene per policy. Determine patient's food preferences and provide high-protein, high-caloric foods as appropriate.     - Assist patient with eating.  - Allow adequate time for meals.  - Encourage patient to take dietary supplement as ordered.  - Collaborate with clinical nutritionist.  - Include patient/family/caregiver in decisions related to nutrition.  Outcome: Progressing

## 2024-05-14 NOTE — TELEPHONE ENCOUNTER
From: Zuleyka Burgos <[email or web address]>  Sent: Tuesday, May 14, 2024 3:10 PM  To: Vascular Surgery Schedulers <[email or web address]>  Subject: Joe Aviles 1940     Hello,     I wanted to put this patient on your radar. He is currently admitted to [facility] for symptomatic right ICA stenosis and would need intervention in the next 1-2 weeks. He is currently on the fence whether he would want surgery or not. In the event that he is agreeable, do you have an idea of where and when surgery would take place? He will likely need a right carotid endarterectomy.     Thanks,   Harriet Aviles (Legal Name)  83 y.o., 1940

## 2024-05-15 ENCOUNTER — PREP FOR PROCEDURE (OUTPATIENT)
Dept: VASCULAR SURGERY | Facility: CLINIC | Age: 84
End: 2024-05-15

## 2024-05-15 NOTE — TELEPHONE ENCOUNTER
Verified patient's insurance   CONFIRMED - Patient's insurance is [employer]  Is patient requesting a call when authorization has been obtained? Patient did not request a call.    Surgery Date: 5/22/24  Primary Surgeon: ADAN Desai/ Sven Kendrick (NPI: [id number])  Assisting Surgeon: Not Applicable (N/A)  Facility: [place] (Tax: [id number] / NPI: [id number])  Inpatient / Outpatient: Inpatient  Level: 2    Clearance Received: No clearance ordered.  Consent Received: Consent will be signed day of procedure.  Medication Hold / Last Dose: Not Applicable (N/A)  IR Notified: Not Applicable (N/A)  Rep. Notified:  [place] notified 5/15/24  Equipment Needs: Not Applicable (N/A)  Vas Lab Requested: Not Applicable (N/A)  Patient Contacted: 5/15/24    Diagnosis: R29.90  Procedure/ CPT Code(s): (TCAR)  Transcarotid Artery Revascularization // CPT: 35251    For varicose vein related procedures:   Last LEVDR: Not Applicable (N/A)  CEAP Classification: Not Applicable (N/A)  VCSS: Not Applicable (N/A)    Post Operative Date/ Time: 6/5/24 , 11am Zhou with Autumn Garvin (NPI: [id number])     *Please review medication hold(s), PATs, and check H&P with patient.*  PATIENT WAS MAILED SURGERY/SHOWERING/DISCHARGE/COVID INSTRUCTIONS AFTER REVIEWING WITH THEM VIA PHONE CALL.

## 2024-05-15 NOTE — TELEPHONE ENCOUNTER
From: Kristyn Mejia <[email or web address]>  Sent: Wednesday, May 15, 2024 8:05 AM  To: Any Kaye <[email or web address]>; Sven Kendrick <[email or web address]>  Subject: RE: [EXTERNAL] RE: TCAR vs CEA 5/22/24 SLB      Good morning Dr. Kendrick,  I have attached the right sided TCAR case plan for patient, JOAN Aviles.  He is an anatomical fit for TCAR.  Access depth is 2.2cm and the CCA runway length is 6.2cm.  Based off the dICA diameter (5.6mm) and the dCCA diameter (7.9mm), we would recommend a 5x30 pre-dil balloon and a 9x40 stent.  If you plan to proceed with TCAR, please confirm patient is on DAPT and a statin per TCAR protocol.  I will pencil him on our schedule for 5/22 (per Any). Please confirm date once a plan is confirmed.  Thanks team,

## 2024-05-15 NOTE — UTILIZATION REVIEW
NOTIFICATION OF ADMISSION DISCHARGE   This is a Notification of Discharge from [facility]. Please be advised that this patient has been discharge from our facility. Below you will find the admission and discharge date and time including the patient’s disposition.   UTILIZATION REVIEW CONTACT:  Monika Kumar  Utilization   Network Utilization Review Department  Phone: [phone] x carefully listen to the prompts. All voicemails are confidential.  Email: [email or web address]     ADMISSION INFORMATION  PRESENTATION DATE: 5/13/2024 11:41 AM  OBERVATION ADMISSION DATE:   INPATIENT ADMISSION DATE: 5/13/24  1:02 PM   DISCHARGE DATE: 5/14/2024  5:39 PM   DISPOSITION:Home/Self Care    Network Utilization Review Department  ATTENTION: Please call with any questions or concerns to [phone] and carefully listen to the prompts so that you are directed to the right person. All voicemails are confidential.   For Discharge needs, contact Care Management DC Support Team at [phone] opt. 2  Send all requests for admission clinical reviews, approved or denied determinations and any other requests to dedicated fax number below belonging to the campus where the patient is receiving treatment. List of dedicated fax numbers for the Facilities:  FACILITY NAME UR FAX NUMBER   ADMISSION DENIALS (Administrative/Medical Necessity) [phone]   DISCHARGE SUPPORT TEAM ([facility]) [phone]   PARENT CHILD HEALTH (Maternity/NICU/Pediatrics) [phone]   [facility] [phone]   [facility] [phone]   [facility] [phone]   [facility] [phone]   [facility] [phone]   [facility] [phone]   [facility] [phone]   [facility]  [phone]   [facility] [phone]   [facility] [phone]   [facility] [phone]   [facility] [phone]

## 2024-05-17 ENCOUNTER — OFFICE VISIT (OUTPATIENT)
Dept: CARDIOLOGY CLINIC | Facility: CLINIC | Age: 84
End: 2024-05-17
Payer: COMMERCIAL

## 2024-05-17 VITALS
SYSTOLIC BLOOD PRESSURE: 136 MMHG | WEIGHT: 168.6 LBS | BODY MASS INDEX: 24.97 KG/M2 | HEART RATE: 87 BPM | DIASTOLIC BLOOD PRESSURE: 100 MMHG | HEIGHT: 69 IN | OXYGEN SATURATION: 96 % | RESPIRATION RATE: 20 BRPM

## 2024-05-17 DIAGNOSIS — I65.23 BILATERAL CAROTID ARTERY STENOSIS: ICD-10-CM

## 2024-05-17 DIAGNOSIS — I73.9 PVD (PERIPHERAL VASCULAR DISEASE) (HCC): ICD-10-CM

## 2024-05-17 DIAGNOSIS — Z01.810 PREOP CARDIOVASCULAR EXAM: Primary | ICD-10-CM

## 2024-05-17 DIAGNOSIS — F17.219 CIGARETTE NICOTINE DEPENDENCE WITH NICOTINE-INDUCED DISORDER: ICD-10-CM

## 2024-05-17 DIAGNOSIS — I10 HYPERTENSION, UNSPECIFIED TYPE: ICD-10-CM

## 2024-05-17 PROCEDURE — 99204 OFFICE O/P NEW MOD 45 MIN: CPT | Performed by: INTERNAL MEDICINE

## 2024-05-17 RX ORDER — AMLODIPINE BESYLATE 5 MG/1
5 TABLET ORAL DAILY
Qty: 90 TABLET | Refills: 3 | Status: SHIPPED | OUTPATIENT
Start: 2024-05-17

## 2024-05-17 RX ORDER — LOSARTAN POTASSIUM 100 MG/1
50 TABLET ORAL DAILY
Qty: 90 TABLET | Refills: 3 | Status: ON HOLD | OUTPATIENT
Start: 2024-05-17 | End: 2024-05-23

## 2024-05-17 NOTE — PROGRESS NOTES
Joe Aviles   1940   [id number]   [unfilled]       PRE-OPERATIVE CARDIAC ASSESSMENT:    TYPE OF PROCEDURE: Carotid endarterectomy    NAME OF SURGEON: [facility] vascular surgery    PRIOR CARDIAC TESTIN. ECHOCARDIOGRAM:   2024: Normal left ventricular systolic function                                                                  2. STRESS TEST:     LEXISCAN-MPI: 2023 at [facility]: No ischemia or infarction    PROPOSED TESTING:  None    MED HOLD: None    BASED ON ABOVE DATA, THIS PATIENT'S CARDIOVASCULAR RISK FOR THE ABOVE PROCEDURE IS:    INTERMEDIATE to HIGH.  No additional cardiac testing is indicated at this time.    If you have any further questions, please feel free to contact me.    ANNAMARIE CASTANEDA MD, [facility] [address]  [address]  [facility] [id number] [phone]  [phone]      Patient name: Joe Aviles   YOB: 1940   MR no: [id number]      Diagnosis ICD-10-CM Associated Orders   1. Preop cardiovascular exam  Z01.810       2. Hypertension, unspecified type  I10 losartan (COZAAR) 100 MG tablet     amLODIPine (NORVASC) 5 mg tablet      3. PVD (peripheral vascular disease) ([facility])  I73.9       4. Bilateral carotid artery stenosis  I65.23       5. Cigarette nicotine dependence with nicotine-induced disorder  F17.219         ASSESSMENT AND RECOMMENDATIONS:  1. Preop cardiovascular exam  Assessment & Plan:  Patient has no symptoms of obstructive coronary artery disease at the present time.  Review of record reveals that he did have a Lexiscan-MPI in 2023 at [facility] which reported no evidence of ischemia or infarction and normal left ventricular systolic function.  Echocardiogram on 14 May 2024 reported normal left ventricular systolic function with no significant valvular pathology.  Based on the current data, his cardiovascular risk for the procedure is intermediate to high.  However this risk is  not modifiable and in the absence of any angina there is no indication to recommend invasive coronary angiography at this time.  In addition, in view of severe bilateral carotid disease and previous strokes, carotid intervention is medically indicated.  2. Hypertension, unspecified type  Assessment & Plan:  Patient admits that his blood pressure is on the high side at home at 140/90+ range.  I have advised him to start amlodipine 5 mg daily.  He states that he already is taking losartan 100 mg daily.  He takes Inderal for essential tremors.  Orders:  -     losartan (COZAAR) 100 MG tablet; Take 0.5 tablets (50 mg total) by mouth daily  -     amLODIPine (NORVASC) 5 mg tablet; Take 1 tablet (5 mg total) by mouth daily  3. PVD (peripheral vascular disease) ([facility])  Assessment & Plan:  Lower extremity duplex ultrasound from February 2023 showed:   The right middle superficial femoral artery demonstrates moderate   (50-75%) stenosis.     The left proximal and middle superficial femoral artery is occluded and   reconstitutes at the distal superficial femoral artery.     The left distal external iliac artery demonstrates severe (>75%)   stenosis.   His GUS was normal on the right side and severely reduced on the left side.  He does have mild claudication as well.  Once his carotid stenosis has been addressed, he will continue to follow with vascular surgery for his lower extremity peripheral arterial disease.  He will continue the dual antiplatelet therapy as well as statin therapy.  4. Bilateral carotid artery stenosis  Assessment & Plan:  Patient is scheduled for vascular surgery clinic evaluation on 21 May followed by carotid endarterectomy on 22 May.  His lipid profile is actually quite good on current therapy.  5. Cigarette nicotine dependence with nicotine-induced disorder  Assessment & Plan:  Pt has been a lifelong smoker.  Strongly counseled regarding smoking cessation.       CHIEF COMPLAINT:  Preop cardiac  evaluation, hypertension, carotid stenosis.    HPI:  83-year-old male with past medical history significant for hypertension, known bilateral moderate to severe carotid stenosis, known bilateral lower extremity arterial disease, chronic cigarette smoking who presents for his first cardiology visit which is a preop visit prior to carotid endarterectomy.  Patient was recently hospitalized in May 2024 [facility] with dizziness and some left arm numbness and CTA showed multiple indeterminate small infarcts in the right basal ganglia and right occipital lobe.  He was also noted to have 83% bilateral internal carotid artery stenosis.  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  Carotid duplex on May 14 reported more than 70% right internal carotid artery stenosis and 70 to 99% left internal carotid artery stenosis.  Interestingly, he is fairly active despite his comorbidities and denies any chest pain, palpitations or syncope.  He does admit to chronic mild dyspnea on exertion.  He still gets intermittent dizziness.    Past Medical History:   Diagnosis Date    BPH (benign prostatic hyperplasia)     Dementia (HCC)     Diverticulitis     Hypertension     Macular degeneration     Stroke (HCC)         Past Surgical History:   Procedure Laterality Date    CYSTOSCOPY          Social History     Tobacco Use    Smoking status: Every Day     Current packs/day: 0.50     Types: Cigarettes    Smokeless tobacco: Never   Vaping Use    Vaping status: Never Used   Substance Use Topics    Alcohol use: Not Currently    Drug use: No       History reviewed. No pertinent family history.     No Known Allergies      Current Outpatient Medications:     amLODIPine (NORVASC) 5 mg tablet, Take 5 mg by mouth daily, Disp: , Rfl:     amLODIPine (NORVASC) 5 mg tablet, Take 1 tablet (5 mg total) by mouth daily, Disp: 90 tablet, Rfl: 3    aspirin 81 mg chewable tablet, Chew 1 tablet (81 mg  total) daily, Disp: 30 tablet, Rfl: 0    atorvastatin (LIPITOR) 40 mg tablet, Take 1 tablet (40 mg total) by mouth every evening, Disp: 30 tablet, Rfl: 0    clopidogrel (PLAVIX) 75 mg tablet, Take 1 tablet (75 mg total) by mouth daily, Disp: 30 tablet, Rfl: 0    losartan (COZAAR) 100 MG tablet, Take 0.5 tablets (50 mg total) by mouth daily, Disp: 90 tablet, Rfl: 3    memantine (NAMENDA) 10 mg tablet, Take 10 mg by mouth daily, Disp: , Rfl:     Multiple Vitamin (MULTIVITAMIN) capsule, Take 1 capsule by mouth daily, Disp: , Rfl:     Multiple Vitamins-Minerals (OCUVITE PO), Take 1 tablet by mouth daily, Disp: , Rfl:     promethazine-dextromethorphan (PHENERGAN-DM) 6.25-15 mg/5 mL oral syrup, Take 5 mL by mouth 4 (four) times a day as needed, Disp: , Rfl:     propranolol (INDERAL) 10 mg tablet, Take 10 mg by mouth 3 (three) times a day, Disp: , Rfl:     sildenafil (VIAGRA) 25 MG tablet, Take 25 mg by mouth daily as needed for erectile dysfunction, Disp: , Rfl:     tamsulosin (FLOMAX) 0.4 mg, Take 0.4 mg by mouth daily with dinner, Disp: , Rfl:     Apoaequorin (PREVAGEN PO), Take 1 Capful by mouth in the morning (Patient not taking: Reported on 5/17/2024), Disp: , Rfl:      Lab Results   Component Value Date    CREATININE 0.98 05/14/2024    CREATININE 1.06 05/13/2024    CREATININE 0.98 05/09/2024    K 3.5 05/14/2024    K 3.9 05/13/2024    K 3.8 05/09/2024    SODIUM 142 05/14/2024    SODIUM 142 05/13/2024    SODIUM 142 05/09/2024    LDLCALC 59 05/14/2024    TRIG 129 05/14/2024    HDL 26 (L) 05/14/2024    CHOLESTEROL 111 05/14/2024    TSH 2.41 03/09/2023       I have personally reviewed the EKG from May 14 2024 which showed normal sinus rhythm with left axis deviation and nonspecific T wave abnormalities.    REVIEW OF SYSTEMS   Positive for: Dizziness, cough  Negative for: All remaining as reviewed below and in HPI.    SYSTEM SYMPTOMS REVIEWED:  General--weight change, fever, night sweats  Respiratory--cough, wheezing,  "shortness of breath, sputum production  Cardiovascular--chest pain, syncope, dyspnea on exertion, edema, decline in exercise tolerance, claudication   Gastrointestinal--persistent vomiting, diarrhea, abdominal distention, blood in stool   Muscular or skeletal--joint pain or swelling   Neurologic--headaches, syncope, abnormal movement  Hematologic--history of easy bruising and bleeding   Endocrine--thyroid enlargement, heat or cold intolerance, polyuria   Psychiatric--anxiety, depression     Vitals:    05/17/24 0758 05/17/24 0804   BP: (!) 164/112 136/100   BP Location: Left arm Right arm   Patient Position: Sitting Sitting   Cuff Size: Standard Standard   Pulse: 87    Resp: 20    SpO2: 96%    Weight: 76.5 kg (168 lb 9.6 oz)    Height: 5' 9\" (1.753 m)        Wt Readings from Last 3 Encounters:   05/17/24 76.5 kg (168 lb 9.6 oz)   05/13/24 77.1 kg (170 lb)   10/19/21 77.1 kg (170 lb)        Body mass index is 24.9 kg/m².     General physical examination:    General appearance: Alert, no acute distress, appears younger than stated age, normal weight HEENT: Mucous membranes are moist.  No obvious abnormality noted.  Neck: Supple with no lymphadenopathy.  No JVD.  Carotid pulses are intact.  No carotid bruit.  Cardiovascular system: Regular rhythm.  Normal S1 and S2.  No murmurs.  No rubs or gallops. Extremities: No edema. No cyanosis.  Pulmonary: Respirations unlabored.  Good air entry bilaterally.  Clear to auscultation bilaterally.  Gastrointestinal: Abdomen is soft and nontender.  Bowel sounds are positive.  Musculoskeletal: Upper Extremities: Normal upper motor strength. Lower Extremity: Normal motor strength. Gait: Normal.   Skin: Skin is warm. No rashes or lesions.  Neurological: Patient is alert and oriented with no gross motor deficits.  Psychiatric: Mood is normal.  Behavior is normal.        Thank you for allowing me to be a part of this patient's care. If you have further questions, please feel free to " "contact me.    Kraig Christensen MD, FACC, [place]    Portions of the record  have been created with voice recognition software.  Occasional grammatical mistakes or wrong word or \"sound alike\" substitutions may have occurred due to the inherent limitations of voice recognition software. Please reach out to me directly for any clarifications.     "

## 2024-05-17 NOTE — ASSESSMENT & PLAN NOTE
Patient admits that his blood pressure is on the high side at home at 140/90+ range.  I have advised him to start amlodipine 5 mg daily.  He states that he already is taking losartan 100 mg daily.  He takes Inderal for essential tremors.

## 2024-05-17 NOTE — ASSESSMENT & PLAN NOTE
Lower extremity duplex ultrasound from February 2023 showed:   The right middle superficial femoral artery demonstrates moderate   (50-75%) stenosis.     The left proximal and middle superficial femoral artery is occluded and   reconstitutes at the distal superficial femoral artery.     The left distal external iliac artery demonstrates severe (>75%)   stenosis.   His GUS was normal on the right side and severely reduced on the left side.  He does have mild claudication as well.  Once his carotid stenosis has been addressed, he will continue to follow with vascular surgery for his lower extremity peripheral arterial disease.  He will continue the dual antiplatelet therapy as well as statin therapy.

## 2024-05-17 NOTE — ASSESSMENT & PLAN NOTE
Patient is scheduled for vascular surgery clinic evaluation on 21 May followed by carotid endarterectomy on 22 May.  His lipid profile is actually quite good on current therapy.

## 2024-05-17 NOTE — ASSESSMENT & PLAN NOTE
Patient has no symptoms of obstructive coronary artery disease at the present time.  Review of record reveals that he did have a Lexiscan-MPI in January 2023 at [facility] which reported no evidence of ischemia or infarction and normal left ventricular systolic function.  Echocardiogram on 14 May 2024 reported normal left ventricular systolic function with no significant valvular pathology.  Based on the current data, his cardiovascular risk for the procedure is intermediate to high.  However this risk is not modifiable and in the absence of any angina there is no indication to recommend invasive coronary angiography at this time.  In addition, in view of severe bilateral carotid disease and previous strokes, carotid intervention is medically indicated.

## 2024-05-20 ENCOUNTER — TELEPHONE (OUTPATIENT)
Dept: CARDIOLOGY CLINIC | Facility: CLINIC | Age: 84
End: 2024-05-20

## 2024-05-20 ENCOUNTER — ANESTHESIA EVENT (OUTPATIENT)
Dept: PERIOP | Facility: HOSPITAL | Age: 84
DRG: 035 | End: 2024-05-20
Payer: COMMERCIAL

## 2024-05-20 DIAGNOSIS — Z01.818 PREOP TESTING: Primary | ICD-10-CM

## 2024-05-20 NOTE — TELEPHONE ENCOUNTER
Caller: Shana Aviles     Doctor: Dr. Christensen     Reason for call: patient's Wife Shana called to ask about what time is the procedure if it will be overnight stay and just a few questions she would like answered I informed her I would relay the concern and someone should be in contact tomorrow with all the information. Joe's procedure is on 5/22/24 per chart   Call back#: [phone]

## 2024-05-21 NOTE — PRE-PROCEDURE INSTRUCTIONS
Pre-Surgery Instructions:   Medication Instructions    amLODIPine (NORVASC) 5 mg tablet Take day of surgery.    aspirin 81 mg chewable tablet Instructions provided by MD    atorvastatin (LIPITOR) 40 mg tablet Take night before surgery    clopidogrel (PLAVIX) 75 mg tablet Instructions provided by MD    losartan (COZAAR) 100 MG tablet Hold day of surgery.    Multiple Vitamin (MULTIVITAMIN) capsule Stop taking 1 day prior to surgery.    propranolol (INDERAL) 10 mg tablet Take day of surgery.    sildenafil (VIAGRA) 25 MG tablet Hold day of surgery.   Medication instructions for day surgery reviewed. Please use only a sip of water to take your instructed medications. Avoid all over the counter vitamins, supplements and NSAIDS for one week prior to surgery per anesthesia guidelines. Tylenol is ok to take as needed.     You will receive a call one business day prior to surgery with an arrival time and hospital directions. If your surgery is scheduled on a Monday, the hospital will be calling you on the Friday prior to your surgery. If you have not heard from anyone by 8pm, please call the hospital supervisor through the hospital  at [phone]. ([place] [phone] or [place] [phone]).    Do not eat or drink anything after midnight the night before your surgery, including candy, mints, lifesavers, or chewing gum. Do not drink alcohol 24hrs before your surgery. Try not to smoke at least 24hrs before your surgery.       Follow the pre surgery showering instructions as listed in the “My Surgical Experience Booklet” or otherwise provided by your surgeon's office. Do not use a blade to shave the surgical area 1 week before surgery. It is okay to use a clean electric clippers up to 24 hours before surgery. Do not apply any lotions, creams, including makeup, cologne, deodorant, or perfumes after showering on the day of your surgery. Do not use dry shampoo, hair spray, hair gel, or any type of hair products.      No contact lenses, eye make-up, or artificial eyelashes. Remove nail polish, including gel polish, and any artificial, gel, or acrylic nails if possible. Remove all jewelry including rings and body piercing jewelry.     Wear causal clothing that is easy to take on and off. Consider your type of surgery.    Keep any valuables, jewelry, piercings at home. Please bring any specially ordered equipment (sling, braces) if indicated.    Arrange for a responsible person to drive you to and from the hospital on the day of your surgery. Please confirm the visitor policy for the day of your procedure when you receive your phone call with an arrival time.     Call the surgeon's office with any new illnesses, exposures, or additional questions prior to surgery.    Please reference your “My Surgical Experience Booklet” for additional information to prepare for your upcoming surgery.

## 2024-05-22 ENCOUNTER — HOSPITAL ENCOUNTER (INPATIENT)
Facility: HOSPITAL | Age: 84
LOS: 1 days | Discharge: HOME | DRG: 035 | End: 2024-05-23
Attending: SURGERY | Admitting: SURGERY
Payer: COMMERCIAL

## 2024-05-22 ENCOUNTER — APPOINTMENT (OUTPATIENT)
Dept: RADIOLOGY | Facility: HOSPITAL | Age: 84
DRG: 035 | End: 2024-05-22
Payer: COMMERCIAL

## 2024-05-22 ENCOUNTER — ANESTHESIA (OUTPATIENT)
Dept: PERIOP | Facility: HOSPITAL | Age: 84
DRG: 035 | End: 2024-05-22
Payer: COMMERCIAL

## 2024-05-22 DIAGNOSIS — I65.21 CAROTID STENOSIS, RIGHT: ICD-10-CM

## 2024-05-22 DIAGNOSIS — I65.23 BILATERAL CAROTID ARTERY STENOSIS: Primary | ICD-10-CM

## 2024-05-22 DIAGNOSIS — I10 HYPERTENSION, UNSPECIFIED TYPE: ICD-10-CM

## 2024-05-22 DIAGNOSIS — Z91.89 AT RISK FOR OBSTRUCTIVE SLEEP APNEA: Primary | ICD-10-CM

## 2024-05-22 PROBLEM — R06.02 SHORTNESS OF BREATH: Status: ACTIVE | Noted: 2024-05-22

## 2024-05-22 PROBLEM — F17.200 SMOKING: Status: ACTIVE | Noted: 2024-05-22

## 2024-05-22 PROBLEM — IMO0001 SMOKING: Status: ACTIVE | Noted: 2024-05-22

## 2024-05-22 LAB
ABO GROUP BLD: NORMAL
ABO GROUP BLD: NORMAL
BLD GP AB SCN SERPL QL: NEGATIVE
KCT BLD-ACNC: 295 SEC (ref 89–137)
RH BLD: POSITIVE
RH BLD: POSITIVE
SPECIMEN EXPIRATION DATE: NORMAL
SPECIMEN SOURCE: ABNORMAL

## 2024-05-22 PROCEDURE — 37215 TRANSCATH STENT CCA W/EPS: CPT | Performed by: SURGERY

## 2024-05-22 PROCEDURE — 037K3DZ DILATION OF RIGHT INTERNAL CAROTID ARTERY WITH INTRALUMINAL DEVICE, PERCUTANEOUS APPROACH: ICD-10-PCS | Performed by: SURGERY

## 2024-05-22 PROCEDURE — C1876 STENT, NON-COA/NON-COV W/DEL: HCPCS | Performed by: SURGERY

## 2024-05-22 PROCEDURE — C1769 GUIDE WIRE: HCPCS | Performed by: SURGERY

## 2024-05-22 PROCEDURE — 86901 BLOOD TYPING SEROLOGIC RH(D): CPT | Performed by: ANESTHESIOLOGY

## 2024-05-22 PROCEDURE — 86850 RBC ANTIBODY SCREEN: CPT | Performed by: ANESTHESIOLOGY

## 2024-05-22 PROCEDURE — 85347 COAGULATION TIME ACTIVATED: CPT

## 2024-05-22 PROCEDURE — 86900 BLOOD TYPING SEROLOGIC ABO: CPT | Performed by: ANESTHESIOLOGY

## 2024-05-22 PROCEDURE — 76937 US GUIDE VASCULAR ACCESS: CPT

## 2024-05-22 PROCEDURE — X2AH336 CEREBRAL EMBOLIC FILTRATION, EXTRACORPOREAL FLOW REVERSAL CIRCUIT FROM RIGHT COMMON CAROTID ARTERY, PERCUTANEOUS APPROACH, NEW TECHNOLOGY GROUP 6: ICD-10-PCS | Performed by: SURGERY

## 2024-05-22 PROCEDURE — NC001 PR NO CHARGE: Performed by: SURGERY

## 2024-05-22 DEVICE — 9 MM X 40 MM
Type: IMPLANTABLE DEVICE | Site: CAROTID | Status: FUNCTIONAL
Brand: ENROUTE TRANSCAROTID STENT

## 2024-05-22 RX ORDER — ROCURONIUM BROMIDE 10 MG/ML
INJECTION, SOLUTION INTRAVENOUS AS NEEDED
Status: DISCONTINUED | OUTPATIENT
Start: 2024-05-22 | End: 2024-05-22

## 2024-05-22 RX ORDER — ASPIRIN 325 MG
650 TABLET ORAL ONCE
Status: COMPLETED | OUTPATIENT
Start: 2024-05-22 | End: 2024-05-22

## 2024-05-22 RX ORDER — HYDROMORPHONE HCL/PF 1 MG/ML
0.5 SYRINGE (ML) INJECTION
Status: DISCONTINUED | OUTPATIENT
Start: 2024-05-22 | End: 2024-05-22 | Stop reason: HOSPADM

## 2024-05-22 RX ORDER — CLOPIDOGREL BISULFATE 75 MG/1
650 TABLET ORAL ONCE
Status: DISCONTINUED | OUTPATIENT
Start: 2024-05-22 | End: 2024-05-22

## 2024-05-22 RX ORDER — ASPIRIN 81 MG/1
81 TABLET, CHEWABLE ORAL DAILY
Status: DISCONTINUED | OUTPATIENT
Start: 2024-05-23 | End: 2024-05-23 | Stop reason: HOSPADM

## 2024-05-22 RX ORDER — SODIUM CHLORIDE 9 MG/ML
75 INJECTION, SOLUTION INTRAVENOUS CONTINUOUS
Status: DISCONTINUED | OUTPATIENT
Start: 2024-05-22 | End: 2024-05-23

## 2024-05-22 RX ORDER — TAMSULOSIN HYDROCHLORIDE 0.4 MG/1
0.4 CAPSULE ORAL
Status: DISCONTINUED | OUTPATIENT
Start: 2024-05-22 | End: 2024-05-23 | Stop reason: HOSPADM

## 2024-05-22 RX ORDER — HEPARIN SODIUM 1000 [USP'U]/ML
INJECTION, SOLUTION INTRAVENOUS; SUBCUTANEOUS AS NEEDED
Status: DISCONTINUED | OUTPATIENT
Start: 2024-05-22 | End: 2024-05-22

## 2024-05-22 RX ORDER — CEFAZOLIN SODIUM 1 G/50ML
1000 SOLUTION INTRAVENOUS EVERY 8 HOURS
Status: DISCONTINUED | OUTPATIENT
Start: 2024-05-22 | End: 2024-05-23

## 2024-05-22 RX ORDER — LABETALOL HYDROCHLORIDE 5 MG/ML
5 INJECTION, SOLUTION INTRAVENOUS
Status: DISCONTINUED | OUTPATIENT
Start: 2024-05-22 | End: 2024-05-23 | Stop reason: HOSPADM

## 2024-05-22 RX ORDER — HEPARIN SODIUM 5000 [USP'U]/ML
5000 INJECTION, SOLUTION INTRAVENOUS; SUBCUTANEOUS EVERY 8 HOURS SCHEDULED
Status: DISCONTINUED | OUTPATIENT
Start: 2024-05-22 | End: 2024-05-23 | Stop reason: HOSPADM

## 2024-05-22 RX ORDER — PROTAMINE SULFATE 10 MG/ML
INJECTION, SOLUTION INTRAVENOUS AS NEEDED
Status: DISCONTINUED | OUTPATIENT
Start: 2024-05-22 | End: 2024-05-22

## 2024-05-22 RX ORDER — SODIUM CHLORIDE, SODIUM LACTATE, POTASSIUM CHLORIDE, CALCIUM CHLORIDE 600; 310; 30; 20 MG/100ML; MG/100ML; MG/100ML; MG/100ML
INJECTION, SOLUTION INTRAVENOUS CONTINUOUS PRN
Status: DISCONTINUED | OUTPATIENT
Start: 2024-05-22 | End: 2024-05-22

## 2024-05-22 RX ORDER — CLOPIDOGREL BISULFATE 75 MG/1
450 TABLET ORAL ONCE
Status: COMPLETED | OUTPATIENT
Start: 2024-05-22 | End: 2024-05-22

## 2024-05-22 RX ORDER — SODIUM CHLORIDE 9 MG/ML
INJECTION, SOLUTION INTRAVENOUS CONTINUOUS PRN
Status: DISCONTINUED | OUTPATIENT
Start: 2024-05-22 | End: 2024-05-22

## 2024-05-22 RX ORDER — CLOPIDOGREL BISULFATE 75 MG/1
75 TABLET ORAL DAILY
Status: DISCONTINUED | OUTPATIENT
Start: 2024-05-23 | End: 2024-05-23 | Stop reason: HOSPADM

## 2024-05-22 RX ORDER — DEXAMETHASONE SODIUM PHOSPHATE 10 MG/ML
INJECTION, SOLUTION INTRAMUSCULAR; INTRAVENOUS AS NEEDED
Status: DISCONTINUED | OUTPATIENT
Start: 2024-05-22 | End: 2024-05-22

## 2024-05-22 RX ORDER — GLYCOPYRROLATE 0.2 MG/ML
INJECTION INTRAMUSCULAR; INTRAVENOUS AS NEEDED
Status: DISCONTINUED | OUTPATIENT
Start: 2024-05-22 | End: 2024-05-22

## 2024-05-22 RX ORDER — ONDANSETRON 2 MG/ML
4 INJECTION INTRAMUSCULAR; INTRAVENOUS ONCE AS NEEDED
Status: DISCONTINUED | OUTPATIENT
Start: 2024-05-22 | End: 2024-05-22 | Stop reason: HOSPADM

## 2024-05-22 RX ORDER — ATORVASTATIN CALCIUM 40 MG/1
40 TABLET, FILM COATED ORAL EVERY EVENING
Status: DISCONTINUED | OUTPATIENT
Start: 2024-05-22 | End: 2024-05-23 | Stop reason: HOSPADM

## 2024-05-22 RX ORDER — MEMANTINE HYDROCHLORIDE 10 MG/1
10 TABLET ORAL DAILY
Status: DISCONTINUED | OUTPATIENT
Start: 2024-05-23 | End: 2024-05-23 | Stop reason: HOSPADM

## 2024-05-22 RX ORDER — PROPOFOL 10 MG/ML
INJECTION, EMULSION INTRAVENOUS AS NEEDED
Status: DISCONTINUED | OUTPATIENT
Start: 2024-05-22 | End: 2024-05-22

## 2024-05-22 RX ORDER — ONDANSETRON 2 MG/ML
INJECTION INTRAMUSCULAR; INTRAVENOUS AS NEEDED
Status: DISCONTINUED | OUTPATIENT
Start: 2024-05-22 | End: 2024-05-22

## 2024-05-22 RX ORDER — LIDOCAINE HYDROCHLORIDE 10 MG/ML
INJECTION, SOLUTION EPIDURAL; INFILTRATION; INTRACAUDAL; PERINEURAL AS NEEDED
Status: DISCONTINUED | OUTPATIENT
Start: 2024-05-22 | End: 2024-05-22

## 2024-05-22 RX ORDER — FENTANYL CITRATE/PF 50 MCG/ML
50 SYRINGE (ML) INJECTION
Status: DISCONTINUED | OUTPATIENT
Start: 2024-05-22 | End: 2024-05-22 | Stop reason: HOSPADM

## 2024-05-22 RX ORDER — ACETAMINOPHEN 325 MG/1
975 TABLET ORAL EVERY 6 HOURS SCHEDULED
Status: DISCONTINUED | OUTPATIENT
Start: 2024-05-22 | End: 2024-05-23 | Stop reason: HOSPADM

## 2024-05-22 RX ORDER — PROPRANOLOL HCL 10 MG
10 TABLET ORAL 3 TIMES DAILY
Status: DISCONTINUED | OUTPATIENT
Start: 2024-05-22 | End: 2024-05-23 | Stop reason: HOSPADM

## 2024-05-22 RX ORDER — PROMETHAZINE HYDROCHLORIDE 25 MG/ML
12.5 INJECTION, SOLUTION INTRAMUSCULAR; INTRAVENOUS ONCE AS NEEDED
Status: DISCONTINUED | OUTPATIENT
Start: 2024-05-22 | End: 2024-05-22 | Stop reason: HOSPADM

## 2024-05-22 RX ORDER — FENTANYL CITRATE 50 UG/ML
INJECTION, SOLUTION INTRAMUSCULAR; INTRAVENOUS AS NEEDED
Status: DISCONTINUED | OUTPATIENT
Start: 2024-05-22 | End: 2024-05-22

## 2024-05-22 RX ORDER — CEFAZOLIN SODIUM 1 G/3ML
INJECTION, POWDER, FOR SOLUTION INTRAMUSCULAR; INTRAVENOUS AS NEEDED
Status: DISCONTINUED | OUTPATIENT
Start: 2024-05-22 | End: 2024-05-22

## 2024-05-22 RX ORDER — AMLODIPINE BESYLATE 5 MG/1
5 TABLET ORAL DAILY
Status: DISCONTINUED | OUTPATIENT
Start: 2024-05-23 | End: 2024-05-23 | Stop reason: HOSPADM

## 2024-05-22 RX ORDER — HYDRALAZINE HYDROCHLORIDE 20 MG/ML
15 INJECTION INTRAMUSCULAR; INTRAVENOUS
Status: DISCONTINUED | OUTPATIENT
Start: 2024-05-22 | End: 2024-05-23 | Stop reason: HOSPADM

## 2024-05-22 RX ORDER — HEPARIN SODIUM 200 [USP'U]/100ML
INJECTION, SOLUTION INTRAVENOUS
Status: COMPLETED | OUTPATIENT
Start: 2024-05-22 | End: 2024-05-22

## 2024-05-22 RX ORDER — IODIXANOL 320 MG/ML
INJECTION, SOLUTION INTRAVASCULAR AS NEEDED
Status: DISCONTINUED | OUTPATIENT
Start: 2024-05-22 | End: 2024-05-22 | Stop reason: HOSPADM

## 2024-05-22 RX ADMIN — CLOPIDOGREL BISULFATE 450 MG: 75 TABLET ORAL at 10:56

## 2024-05-22 RX ADMIN — LABETALOL HYDROCHLORIDE 5 MG: 5 INJECTION, SOLUTION INTRAVENOUS at 19:36

## 2024-05-22 RX ADMIN — SODIUM CHLORIDE 75 ML/HR: 0.9 INJECTION, SOLUTION INTRAVENOUS at 20:29

## 2024-05-22 RX ADMIN — PROPRANOLOL HYDROCHLORIDE 10 MG: 10 TABLET ORAL at 22:05

## 2024-05-22 RX ADMIN — CEFAZOLIN 2000 MG: 1 INJECTION, POWDER, FOR SOLUTION INTRAMUSCULAR; INTRAVENOUS at 14:59

## 2024-05-22 RX ADMIN — ATORVASTATIN CALCIUM 40 MG: 40 TABLET, FILM COATED ORAL at 22:05

## 2024-05-22 RX ADMIN — GLYCOPYRROLATE 0.2 MG: 0.2 INJECTION, SOLUTION INTRAMUSCULAR; INTRAVENOUS at 15:07

## 2024-05-22 RX ADMIN — NICARDIPINE HYDROCHLORIDE 5 MG/HR: 2.5 INJECTION, SOLUTION INTRAVENOUS at 16:09

## 2024-05-22 RX ADMIN — FENTANYL CITRATE 50 MCG: 50 INJECTION INTRAMUSCULAR; INTRAVENOUS at 15:32

## 2024-05-22 RX ADMIN — ONDANSETRON 4 MG: 2 INJECTION INTRAMUSCULAR; INTRAVENOUS at 15:01

## 2024-05-22 RX ADMIN — CEFAZOLIN SODIUM 1000 MG: 1 SOLUTION INTRAVENOUS at 22:07

## 2024-05-22 RX ADMIN — HEPARIN SODIUM 7000 UNITS: 1000 INJECTION INTRAVENOUS; SUBCUTANEOUS at 15:19

## 2024-05-22 RX ADMIN — SODIUM CHLORIDE, SODIUM LACTATE, POTASSIUM CHLORIDE, AND CALCIUM CHLORIDE: .6; .31; .03; .02 INJECTION, SOLUTION INTRAVENOUS at 14:27

## 2024-05-22 RX ADMIN — HEPARIN SODIUM 5000 UNITS: 5000 INJECTION INTRAVENOUS; SUBCUTANEOUS at 22:06

## 2024-05-22 RX ADMIN — SUGAMMADEX 200 MG: 100 INJECTION, SOLUTION INTRAVENOUS at 16:04

## 2024-05-22 RX ADMIN — PHENYLEPHRINE HYDROCHLORIDE 20 MCG/MIN: 10 INJECTION INTRAVENOUS at 14:42

## 2024-05-22 RX ADMIN — ACETAMINOPHEN 975 MG: 325 TABLET, FILM COATED ORAL at 22:02

## 2024-05-22 RX ADMIN — GLYCOPYRROLATE 0.2 MG: 0.2 INJECTION, SOLUTION INTRAMUSCULAR; INTRAVENOUS at 15:01

## 2024-05-22 RX ADMIN — PROTAMINE SULFATE 40 MG: 10 INJECTION, SOLUTION INTRAVENOUS at 15:49

## 2024-05-22 RX ADMIN — DEXAMETHASONE SODIUM PHOSPHATE 10 MG: 10 INJECTION, SOLUTION INTRAMUSCULAR; INTRAVENOUS at 15:01

## 2024-05-22 RX ADMIN — ROCURONIUM BROMIDE 50 MG: 10 INJECTION, SOLUTION INTRAVENOUS at 14:28

## 2024-05-22 RX ADMIN — FENTANYL CITRATE 25 MCG: 50 INJECTION INTRAMUSCULAR; INTRAVENOUS at 14:27

## 2024-05-22 RX ADMIN — PROPOFOL 150 MG: 10 INJECTION, EMULSION INTRAVENOUS at 14:27

## 2024-05-22 RX ADMIN — SODIUM CHLORIDE: 0.9 INJECTION, SOLUTION INTRAVENOUS at 14:38

## 2024-05-22 RX ADMIN — LIDOCAINE HYDROCHLORIDE 50 MG: 10 INJECTION, SOLUTION EPIDURAL; INFILTRATION; INTRACAUDAL; PERINEURAL at 14:27

## 2024-05-22 RX ADMIN — ASPIRIN 325 MG ORAL TABLET 650 MG: 325 PILL ORAL at 10:56

## 2024-05-22 RX ADMIN — FENTANYL CITRATE 75 MCG: 50 INJECTION INTRAMUSCULAR; INTRAVENOUS at 14:54

## 2024-05-22 NOTE — OP NOTE
OPERATIVE REPORT  PATIENT NAME: Joe Aviles    :  1940  MRN: [id number]  Pt Location: [facility] OR ROOM 02    SURGERY DATE: 2024    Surgeons and Role:     * Sven Barajas, DO - Primary     * Guille Ospina, DO - Fellow    Preop Diagnosis:  Stroke-like symptoms [R29.90]    Post-Op Diagnosis Codes:     * Stroke-like symptoms [R29.90]    Procedure(s):  Right - ANGIOPLASTY ARTERY CAROTID W/ STENT    Specimen(s):  * No specimens in log *    Estimated Blood Loss:   Minimal    Drains:  * No LDAs found *    Anesthesia Type:   Choice    Operative Indications:  Stroke-like symptoms [R29.90]      82 y/o male with PMH HTN, BPH, Thrombocytopenia, mild dementia, 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 Discussed risk and benefits of intervention     Operative Findings:    Right TCAR with 5 x 30 Aviator pre-dilation PTA, 9 x 40 Cordis SES    Patient tolerated procedure well and was extubated neurologically intact, moving all extremities.     Contrast: 16cc visi  FT: 3.6 min  DAP: 19.41    Complications:   None    Procedure and Technique:    After informed consent was obtained, the patient was brought to the hybrid OR and placed in the supine position.  General anesthesia was administered and an arterial line monitor was placed by the anesthesia team. The neck was extended and rotated to expose the right neck. The carotid artery was imaged via ultrasound and the carotid bifurcation was identified proximally 6 cm cephalad to the clavicle.     The right neck was then prepped and draped in the regular sterile fashion as was the left groin. A timeout was performed.  Local anesthetic was injected just above the clavicle over the sternocleidomastoid muscle. A 2-3 centimeter incision was made in a transverse fashion. Electrocautery was used to dissect through the soft tissue and the platysma was divided. The sternocleidomastoid muscle was identified and the 2 heads were  . The internal jugular vein was identified and dissected free longitudinally over a 2-3 centimeters segment. The jugular vein was retracted laterally exposing the common carotid artery. The vagus nerve was identified posterior to the common carotid artery and was preserved throughout dissection. The anterior surface of the common carotid artery was exposed over a 2-3 centimeter segment and encircled inferiorly with umbilical tape.     IV heparin was administered. An ACT level was obtained. Further heparin was administered to maintain a therapeutic level     A pursestring suture was placed on the anterior surface of the common carotid artery with a 5-0 Prolene suture and left untied. The left femoral vein was then imaged via ultrasound and cannulated with a micropuncture kit and a 5 Serbian sheath was placed.     The right common carotid artery was cannulated with a micropuncture system through the pursestring suture. The dilator was then placed and images were obtained of the carotid bifurcation in multiple obliquities. These showed patency of the external carotid artery and a critical stenosis of approximately 80% in the internal carotid artery over a 3cm segment. An angled wire was then used to cannulate the external carotid artery under roadmap imaging and the sheath was advanced into the external carotid artery and the wire was exchanged for a stiff wire. The reversal of flow sheath was then passed over this wire and positioned in the common carotid artery. The dilator and wire were removed. The sheath was then secured with silk suture. The reversal of flow venous sheath was then placed and the sheaths were connected with the filter which was prepped appropriately to avoid any air in the system. There was noted to be good flow retrograde through the filter.     At this point, the common carotid artery was occluded with a silastic loop. A roadmap image was obtained and a [employer] wire was used to traverse  the internal carotid artery stenosis. A 5 x 30 mm balloon was then positioned across the stenosis and inflated. The patient remained hemodynamically stable during this inflation. Completion imaging showed significant improvement but residual stenosis in the internal carotid artery. A 9 x 40 mm self-expanding stent was then positioned across the stenosis and deployed. The stent appeared constrained at the area of high-grade stenosis. Completion imaging showed no significant residual stenosis in 2 separate obliquities.     The wire was then removed and the silastic loop was released. Completion imaging showed rapid flow through the internal carotid artery without significant residual stenosis. The reversal of flow tubing and filter were then disconnected, drained into the venous system, and then removed. The reversal of flow arterial sheath was then removed and the puncture site closed with the previously placed 5-0 Prolene pursestring suture.     40mg of protamine were administered    No bleeding points were identified. The neck wound was then irrigated with antibiotic saline solution. The platysma was reapproximated with a running 3-0 vicryl suture. The skin was closed with a running 4-0 monocryl suture and dressed with histoacryl glue.     The left femoral sheath was removed and manual compression was held until hemostasis was achieved.     The patient remained neurologically intact and awake during this entire procedure.     Dr. Kendrick was present and scrubbed for the entire procedure. All sharps and counts were correct x2.     Patient Disposition:  PACU         SIGNATURE: Guille Lynn DO Bhavesh  DATE: May 22, 2024  TIME: 4:14 PM      Vascular Quality Initiative - Carotid Artery Stent    Functional Status: Fully active; able to carry on all predisease activities without restriction.    High Risk Factors For CEA: Medical risk Age >=75    Refused for Surgery: No    Pre-op Imaging is CT/CTA: Yes      Lesion  Calcification: 26-50% circumference    Arch Atherosclerosis: Mild    Urgency: Elective      ASA: III  Anesthesia:  General    Indication: Symptomatic Stenosis    Skin Prep: Chlorhexidene    Arch Type: Type I    Bovine Arch: no     Approach: Carotid open     Medication Loading: ASA or P2Y12 antagonist    Prophylactic Anti-bradyarrhythmic yes    Anticoagulant: Heparin    Antiplatelet IIb/IIIa: no    Bradyarrhythmia Requiring Tx: No    Fluoro Time:     Distinct Lesions Treated: 1      If Distinct Lesions Treated is 1, Second Stenosis (Not Treated): No    Lesion Type: Atherosclerosis  Lesion Side: right    Lesion Location: ICA     ICA Distal Tortuosity: None/Mild    Lesion Length: 3mm    Lesion Stenosis:80%    Protection Device Used: Yes, successful      Protection Device Type: Flow reversal     Flow Reversal Type: [address] ENROUTE      Flow Reversal Time: 6 minutes    Pre-dilate Before Protection Device: no  Angioplasty required in order to cross lesion with protection device. For TCAR procedures answer yes if vessel pre-dilated prior to stent placement, or no if not pre-dilated before stent placement.     Pre-dilate Lesion: yes  Pre-dilate Lesion: Angioplasty required in order to cross lesion for stent placement. Required even if there is a technical failure in stent deployment.     Technical Failure: No       Number of Stents: 1    Post- dilate: No      Neurologic Change: No    Intra-Cranial Completion Angiogram: No    Total Procedure Time:   Event Time In   Procedure Start 1511   Procedure Closing    Procedure Finish 1603

## 2024-05-22 NOTE — ANESTHESIA PREPROCEDURE EVALUATION
Procedure:  ANGIOPLASTY ARTERY CAROTID W/ STENT (Right: Neck)    Relevant Problems   ANESTHESIA (within normal limits)      CARDIO   (+) Bilateral carotid artery stenosis   (+) HTN (hypertension)   (-) Angina at rest   (-) Angina of effort   (-) Chest pain   (-) BROWN (dyspnea on exertion)      ENDO   (-) Diabetes mellitus type 1 (HCC)   (-) Diabetes mellitus, type 2 (HCC)      GI/HEPATIC   (-) Chronic liver disease      /RENAL   (+) BPH (benign prostatic hyperplasia)   (-) Chronic kidney disease      NEURO/PSYCH   (+) Acute ischemic multifocal right-sided anterior circulation stroke (HCC) (Some residual LUE weakness)   (+) Dementia (HCC)   (-) Seizures (HCC)      PULMONARY   (+) Shortness of breath   (+) Smoking   (-) Asthma   (-) Sleep apnea      Echo 05/13/2024:  Normal biventricular systolic function. LVEF 60%. Mild concentric LV hypertrophy. No AI/AS, trace MR, trace TR.     Per cardiology pre-op evaluation: Lexiscan-MPI in January 2023 at [facility] which reported no evidence of ischemia or infarction and normal left ventricular systolic function     Carotid exam 05/14/2024:  Impression  RIGHT:  There is >70% stenosis noted in the internal carotid artery. Plaque is  heterogenous and irregular.  Vertebral artery flow is antegrade. There is no significant subclavian artery  disease.     LEFT:  There is 70-99% stenosis noted in the internal carotid artery. Plaque is  heterogenous and irregular.  Vertebral artery flow is antegrade. There is no significant subclavian artery  disease.    Physical Exam    Airway    Mallampati score: I  TM Distance: >3 FB  Neck ROM: full     Dental    lower dentures and upper dentures    Cardiovascular      Pulmonary      Other Findings        Anesthesia Plan  ASA Score- 3     Anesthesia Type- general with ASA Monitors.         Additional Monitors: arterial line.    Airway Plan: ETT.           Plan Factors-Exercise tolerance (METS): >4 METS.    Chart reviewed. EKG reviewed. Imaging results  reviewed. Existing labs reviewed. Patient summary reviewed.    Patient is a current smoker.  Patient smoked on day of surgery.            Induction- intravenous.    Postoperative Plan- Plan for postoperative opioid use. Planned trial extubation        Informed Consent- Anesthetic plan and risks discussed with patient.  I personally reviewed this patient with the CRNA. Discussed and agreed on the Anesthesia Plan with the CRNA..

## 2024-05-22 NOTE — ANESTHESIA POSTPROCEDURE EVALUATION
Post-Op Assessment Note    CV Status:  Stable  Pain Score: 0    Pain management: adequate       Mental Status:  Alert and awake   Hydration Status:  Euvolemic   PONV Controlled:  Controlled   Airway Patency:  Patent     Post Op Vitals Reviewed: Yes    No anethesia notable event occurred.    Staff: Anesthesiologist, CRNA               /64 (05/22/24 1618)    Temp 97.5 °F (36.4 °C) (05/22/24 1618)    Pulse 94 (05/22/24 1618)   Resp 16 (05/22/24 1618)    SpO2 96 % (05/22/24 1618)

## 2024-05-22 NOTE — H&P
H&P Exam - Vascular Surgery   Joe Aviles 83 y.o. male MRN: [id number]  Unit/Bed#: OR POOL Encounter: [id number]    Assessment & Plan     Assessment:    82 y/o male with PMH HTN, BPH, Thrombocytopenia, mild dementia, Right multifocal CVA (5/13/24) presenting for symptomatic right carotid stenosis.     Plan:  - Unfortunately, patient stopped taking aspirin and plavix on Monday  - Discussed with patient and presented options of proceeding with TCAR today after loading dose of aspirin and plavix and waiting for 4 hours or rescheduling the procedure for next week  - Patient would like to proceed with Right TCAR today  - Discussed risk of stroke, bleeding, and need for life long surveillance  - Pt s/e with Dr. Kendrick    History of Present Illness     HPI:  Joe Aviles is a 83 y.o. male who presents with no changes in PMH. Denies recurrent left arm weakness, numbness.    Review of Systems   All other systems reviewed and are negative.      Historical Information   Past Medical History:   Diagnosis Date    BPH (benign prostatic hyperplasia)     Colon polyp     Dementia (HCC)     Diverticulitis     Hypertension     Macular degeneration     Stroke (HCC)      Past Surgical History:   Procedure Laterality Date    COLONOSCOPY      CYSTOSCOPY      PROSTATE SURGERY      TONSILLECTOMY      VASECTOMY       Social History   Social History     Substance and Sexual Activity   Alcohol Use Not Currently     Social History     Substance and Sexual Activity   Drug Use No     Social History     Tobacco Use   Smoking Status Every Day    Current packs/day: 0.50    Average packs/day: 0.5 packs/day for 67.4 years (33.7 ttl pk-yrs)    Types: Cigarettes    Start date: 1957   Smokeless Tobacco Never     E-Cigarette/Vaping    E-Cigarette Use Never User      E-Cigarette/Vaping Substances    Nicotine No     THC No     CBD No     Flavoring No      Family History: non-contributory    Meds/Allergies   all current active meds have been  "reviewed and PTA meds:   Prior to Admission Medications   Prescriptions Last Dose Informant Patient Reported? Taking?   Apoaequorin (PREVAGEN PO) Unknown  Yes No   Sig: Take 1 Capful by mouth in the morning   Patient not taking: Reported on 5/17/2024   Multiple Vitamin (MULTIVITAMIN) capsule 5/20/2024  Yes Yes   Sig: Take 1 capsule by mouth daily   Multiple Vitamins-Minerals (OCUVITE PO) 5/20/2024  Yes No   Sig: Take 1 tablet by mouth daily   amLODIPine (NORVASC) 5 mg tablet 5/20/2024  Yes Yes   Sig: Take 5 mg by mouth daily   amLODIPine (NORVASC) 5 mg tablet 5/20/2024  No No   Sig: Take 1 tablet (5 mg total) by mouth daily   aspirin 81 mg chewable tablet 5/20/2024  No Yes   Sig: Chew 1 tablet (81 mg total) daily   atorvastatin (LIPITOR) 40 mg tablet 5/20/2024  No Yes   Sig: Take 1 tablet (40 mg total) by mouth every evening   clopidogrel (PLAVIX) 75 mg tablet 5/20/2024  No Yes   Sig: Take 1 tablet (75 mg total) by mouth daily   losartan (COZAAR) 100 MG tablet 5/20/2024  No Yes   Sig: Take 0.5 tablets (50 mg total) by mouth daily   Patient taking differently: Take 100 mg by mouth daily   memantine (NAMENDA) 10 mg tablet 5/20/2024  Yes No   Sig: Take 10 mg by mouth daily   promethazine-dextromethorphan (PHENERGAN-DM) 6.25-15 mg/5 mL oral syrup Unknown  Yes No   Sig: Take 5 mL by mouth 4 (four) times a day as needed   propranolol (INDERAL) 10 mg tablet 5/20/2024  Yes Yes   Sig: Take 10 mg by mouth 3 (three) times a day   sildenafil (VIAGRA) 25 MG tablet Unknown  Yes No   Sig: Take 25 mg by mouth daily as needed for erectile dysfunction   tamsulosin (FLOMAX) 0.4 mg 5/20/2024  Yes No   Sig: Take 0.4 mg by mouth daily with dinner      Facility-Administered Medications: None     No Known Allergies    Objective   Vitals: Blood pressure 159/93, pulse 95, temperature 98.5 °F (36.9 °C), temperature source Temporal, resp. rate 18, height 5' 9\" (1.753 m), weight 76.2 kg (168 lb), SpO2 96%.,Body mass index is 24.81 kg/m².  No " intake or output data in the 24 hours ending 05/22/24 1044  Invasive Devices       Peripheral Intravenous Line  Duration             Peripheral IV 05/22/24 Proximal;Right;Ventral (anterior) Forearm <1 day                    Physical Exam  Vitals and nursing note reviewed.   Constitutional:       General: He is not in acute distress.     Appearance: He is well-developed. He is not ill-appearing, toxic-appearing or diaphoretic.   HENT:      Head: Normocephalic and atraumatic.   Eyes:      Conjunctiva/sclera: Conjunctivae normal.   Cardiovascular:      Rate and Rhythm: Normal rate and regular rhythm.      Heart sounds: No murmur heard.  Pulmonary:      Effort: Pulmonary effort is normal. No respiratory distress.      Breath sounds: Normal breath sounds.   Abdominal:      Palpations: Abdomen is soft.      Tenderness: There is no abdominal tenderness.   Musculoskeletal:         General: No swelling.      Cervical back: Neck supple.      Right lower leg: No edema.      Left lower leg: No edema.   Skin:     General: Skin is warm and dry.      Capillary Refill: Capillary refill takes less than 2 seconds.   Neurological:      General: No focal deficit present.      Mental Status: He is alert and oriented to person, place, and time. Mental status is at baseline.      Comments: B/L UE and LE motor and sensory intact    Psychiatric:         Mood and Affect: Mood normal.         Lab Results: I have personally reviewed pertinent reports.    Imaging: I have personally reviewed pertinent reports.    EKG, Pathology, and Other Studies: I have personally reviewed pertinent reports.      Code Status: Prior  Advance Directive and Living Will:      Power of :    POLST:

## 2024-05-23 VITALS
BODY MASS INDEX: 24.02 KG/M2 | RESPIRATION RATE: 18 BRPM | HEIGHT: 69 IN | DIASTOLIC BLOOD PRESSURE: 73 MMHG | WEIGHT: 162.2 LBS | HEART RATE: 82 BPM | SYSTOLIC BLOOD PRESSURE: 125 MMHG | TEMPERATURE: 98.3 F | OXYGEN SATURATION: 95 %

## 2024-05-23 LAB
ANION GAP SERPL CALCULATED.3IONS-SCNC: 10 MMOL/L (ref 4–13)
APTT PPP: 33 SECONDS (ref 23–37)
BUN SERPL-MCNC: 18 MG/DL (ref 5–25)
CALCIUM SERPL-MCNC: 8.4 MG/DL (ref 8.4–10.2)
CHLORIDE SERPL-SCNC: 110 MMOL/L (ref 96–108)
CO2 SERPL-SCNC: 22 MMOL/L (ref 21–32)
CREAT SERPL-MCNC: 1.01 MG/DL (ref 0.6–1.3)
ERYTHROCYTE [DISTWIDTH] IN BLOOD BY AUTOMATED COUNT: 13.5 % (ref 11.6–15.1)
GFR SERPL CREATININE-BSD FRML MDRD: 69 ML/MIN/1.73SQ M
GLUCOSE SERPL-MCNC: 158 MG/DL (ref 65–140)
HCT VFR BLD AUTO: 40.5 % (ref 36.5–49.3)
HGB BLD-MCNC: 13.6 G/DL (ref 12–17)
INR PPP: 1.19 (ref 0.84–1.19)
MCH RBC QN AUTO: 32.5 PG (ref 26.8–34.3)
MCHC RBC AUTO-ENTMCNC: 33.6 G/DL (ref 31.4–37.4)
MCV RBC AUTO: 97 FL (ref 82–98)
PLATELET # BLD AUTO: 139 THOUSANDS/UL (ref 149–390)
PMV BLD AUTO: 12.1 FL (ref 8.9–12.7)
POTASSIUM SERPL-SCNC: 3.9 MMOL/L (ref 3.5–5.3)
PROTHROMBIN TIME: 15 SECONDS (ref 11.6–14.5)
RBC # BLD AUTO: 4.18 MILLION/UL (ref 3.88–5.62)
SODIUM SERPL-SCNC: 142 MMOL/L (ref 135–147)
WBC # BLD AUTO: 11.71 THOUSAND/UL (ref 4.31–10.16)

## 2024-05-23 PROCEDURE — 85730 THROMBOPLASTIN TIME PARTIAL: CPT | Performed by: STUDENT IN AN ORGANIZED HEALTH CARE EDUCATION/TRAINING PROGRAM

## 2024-05-23 PROCEDURE — NC001 PR NO CHARGE: Performed by: PHYSICIAN ASSISTANT

## 2024-05-23 PROCEDURE — 80048 BASIC METABOLIC PNL TOTAL CA: CPT | Performed by: STUDENT IN AN ORGANIZED HEALTH CARE EDUCATION/TRAINING PROGRAM

## 2024-05-23 PROCEDURE — 85027 COMPLETE CBC AUTOMATED: CPT | Performed by: STUDENT IN AN ORGANIZED HEALTH CARE EDUCATION/TRAINING PROGRAM

## 2024-05-23 PROCEDURE — 99024 POSTOP FOLLOW-UP VISIT: CPT | Performed by: PHYSICIAN ASSISTANT

## 2024-05-23 PROCEDURE — 85610 PROTHROMBIN TIME: CPT | Performed by: STUDENT IN AN ORGANIZED HEALTH CARE EDUCATION/TRAINING PROGRAM

## 2024-05-23 RX ORDER — LOSARTAN POTASSIUM 100 MG/1
100 TABLET ORAL DAILY
COMMUNITY
Start: 2024-05-23 | End: 2024-07-01

## 2024-05-23 RX ORDER — ACETAMINOPHEN 325 MG/1
650 TABLET ORAL EVERY 6 HOURS PRN
COMMUNITY
Start: 2024-05-23

## 2024-05-23 RX ADMIN — B-COMPLEX W/ C & FOLIC ACID TAB 1 TABLET: TAB at 09:26

## 2024-05-23 RX ADMIN — HEPARIN SODIUM 5000 UNITS: 5000 INJECTION INTRAVENOUS; SUBCUTANEOUS at 05:14

## 2024-05-23 RX ADMIN — PROPRANOLOL HYDROCHLORIDE 10 MG: 10 TABLET ORAL at 09:31

## 2024-05-23 RX ADMIN — ASPIRIN 81 MG CHEWABLE TABLET 81 MG: 81 TABLET CHEWABLE at 09:26

## 2024-05-23 RX ADMIN — MEMANTINE 10 MG: 10 TABLET ORAL at 09:26

## 2024-05-23 RX ADMIN — CLOPIDOGREL BISULFATE 75 MG: 75 TABLET ORAL at 09:25

## 2024-05-23 RX ADMIN — AMLODIPINE BESYLATE 5 MG: 5 TABLET ORAL at 09:25

## 2024-05-23 RX ADMIN — ACETAMINOPHEN 975 MG: 325 TABLET, FILM COATED ORAL at 05:14

## 2024-05-23 NOTE — ASSESSMENT & PLAN NOTE
82 y/o M presents for elective carotid revascularization for symptomatic ARTURO stenosis w/ recent R hemispheric CVA    S/P Right TCAR 5/22    Plan:  Continue ASA, Plavix, statin  Outpatient follow-up in the Vascular Center

## 2024-05-23 NOTE — DISCHARGE SUMMARY
[facility]  Discharge- Joe Aviles 1940, 83 y.o. male MRN: [id number]  Unit/Bed#: [facility] 530-01 Encounter: [id number]  Primary Care Provider: Jayme Wolff MD   Date and time admitted to hospital: 5/22/2024  9:21 AM    * Bilateral carotid artery stenosis  Assessment & Plan  82 y/o M presents for elective carotid revascularization for symptomatic ARTURO stenosis w/ recent R hemispheric CVA    S/P Right TCAR 5/22    Plan:  Continue ASA, Plavix, statin  Outpatient follow-up in the Vascular Center      Secondary Diagnosis:  Past Medical History:   Diagnosis Date    BPH (benign prostatic hyperplasia)     Colon polyp     Dementia (HCC)     Diverticulitis     Hypertension     Macular degeneration     Stroke (HCC)      Past Surgical History:   Procedure Laterality Date    COLONOSCOPY      CYSTOSCOPY      IL TCAT IV STENT CRV CRTD ART EMBOLIC PROTECJ Right 5/22/2024    Procedure: ANGIOPLASTY ARTERY CAROTID W/ STENT;  Surgeon: Sven Barajas DO;  Location: [facility];  Service: Vascular    PROSTATE SURGERY      TONSILLECTOMY      VASECTOMY          Admitting Diagnosis: Stroke-like symptoms [R29.90]    Attending: Dr. Barajas    Procedures Performed: Right TCAR 5/22    Discharge Medications:  See after visit summary for reconciled discharge medications provided to patient and family.      Hospital Course: 83-year-old gentleman admitted electively to [facility] for treatment of symptomatic right ICA stenosis.  He underwent right TCAR on 5/22/2024 by Dr. Barajas.  He tolerated surgery well.  Following surgery was transferred postanesthesia care unit where he recovered briefly and subsequent to stepdown unit for monitoring overnight.  His postoperative course was uncomplicated.  He was hemodynamically stable and neurovascularly intact.  He was able to tolerate a diet, ambulate and void without difficulty.  He was deemed appropriate for discharge home in the  care of his family on postoperative day #1.  Outpatient follow-up in the vascular center was arranged prior to his discharge.    Condition at Discharge: stable     Provisions for Follow-Up Care:  See after visit summary for information related to follow-up care and any pertinent home health orders.      Disposition: Home    Discharge instructions/Information to patient and family:   See after visit summary for information provided to patient and family.    Planned Readmission: No    Discharge Statement   I spent 30 minutes discharging the patient. This time was spent on the day of discharge. I had direct contact with the patient on the day of discharge. Additional documentation is required if more than 30 minutes were spent on discharge.

## 2024-05-23 NOTE — DISCHARGE INSTR - AVS FIRST PAGE
DISCHARGE INSTRUCTIONS  CAROTID ARTERIOGRAM/STENT    ACTIVITY:  Limit your physical activity to walking for the first week and then increase your activity as tolerated.  Walking up steps and normal activities may be resumed as you feel ready.  Avoid strenuous activity such as vigorous exercise.  Avoid heavy lifting (do not lift more than 15 pounds) for the first four weeks after surgery.  You should not drive a car for at least one week following discharge from the hospital and you are off all narcotic pain medication.  You may ride in a car.  If you have had a stroke or mini-stroke, please consult with your neurologist prior to driving.    DO NOT START OR RESUME ANY PREVIOUSLY PLANNED THERAPY (PHYSICAL, CARDIAC, REHAB, ETC…) UNTIL YOU DISCUSS WITH YOUR SURGEON AND THEY FEEL IT IS SAFE TO ENGAGE IN THERAPY.    DIET:  Resume your normal diet.  Good nutrition is important for healing of your incision.  Drink more water than usual for the next 24 hours.  You can expect to have a sore throat and trouble swallowing after surgery which should improve quickly.  If you feel like you are choking, please call your doctor.    RECURRENT SYMPTOMS: If you develop any new numbness, weakness, vision changes, drooping of the face, or difficulty with speech after discharge, CALL 911 or go to the nearest Emergency department immediately.    INCISION/PROCEDURE SITE: You have an incision site at your neck and a procedure site in your groin.  You may have surgical glue at these sites.  There are stitches present under the skin which will absorb on their own.  The glue is used to cover the access, assist in closure, and prevent contamination. This adhesive will darken and peel away on its own within one to two weeks. Do not pick at it.  If you have a bandage over either site, please remove this on the second day after surgery.    You should shower daily.  Wash incision daily with soap and water, but do not rub or scrub the incision; rinse  thoroughly and pat dry.   Do not bathe in a tub or swim for the first 4 weeks following surgery or if you have any open wounds.  It is normal to have some bruising, swelling or discoloration around the incision.  IF increasing redness, pain, bleeding or a bulge develops, call our office immediately.    If you notice any active bleeding at the site, apply pressure to the site and call our office ([phone]) or [phone].    FOLLOW UP STUDIES:  Doppler ultrasound studies are very important to your post-procedure care.  Your Physician will arrange for them at your first post-procedure visit.  The first study will be 6 weeks after surgery.    FOLLOW UP APPOINTMENTS:  Making and keeping follow up appointments and ultrasound tests are important to your recovery.  If you have difficulty making it to or keeping your follow up appointments, call the office.    If you have increased pain, fever >101.5, increased drainage, redness or a bad smell at your surgery site, new coldness/numbness of your arm or leg, please call us immediately and GO directly to the ER.    PLEASE CALL THE OFFICE IF YOU HAVE ANY QUESTIONS  [phone]  [phone] [address], [address], [place] [address]  [address] [facility], [address], [place] [address]  [address] [place] [address]  [address] [place] [address], [address], [place] [address]  [address] [facility], [place] [address]  [address] [facility], 2nd Floor, [address], [place] [address]  [address] [facility], [address], [place] [address]  [address] [facility], 1st Floor, [address], [place] [address]  [address], [facility], [place] [address]  [address] [address]

## 2024-05-23 NOTE — PROGRESS NOTES
"[facility]  Progress Note  Name: Joe Aviles I  MRN: [id number]  [facility] Date of Admission: 5/22/2024   Date of Service: 5/23/2024 I Hospital Day: 1    Assessment & Plan   * Bilateral carotid artery stenosis  Assessment & Plan  82 y/o M presents for elective carotid revascularization for symptomatic ARTURO stenosis w/ recent R hemispheric CVA    S/P Right TCAR 5/22    Plan:  Continue ASA, Plavix, statin  D/C [place]  Void trial  Acute blood loss anemia, as expected for procedure, minimal blood loss, related to IV fluid administration leyla-op  Diet: Regular  OOB/Amb   Tentative discharge home later today             Subjective:  Pt doing well, no events overnight    Vitals:  /63   Pulse 104   Temp 98.3 °F (36.8 °C)   Resp 18   Ht 5' 9\" (1.753 m)   Wt 76.2 kg (168 lb)   SpO2 94%   BMI 24.81 kg/m²     I/Os:  I/O last 3 completed shifts:  In: 800 [I.V.:800]  Out: 540 [Urine:540]  I/O this shift:  In: -   Out: 50 [Urine:50]    Lab Results and Cultures:   Lab Results   Component Value Date    WBC 11.71 (H) 05/23/2024    HGB 13.6 05/23/2024    HCT 40.5 05/23/2024    MCV 97 05/23/2024     (L) 05/23/2024     Lab Results   Component Value Date    CALCIUM 8.4 05/23/2024    K 3.9 05/23/2024    CO2 22 05/23/2024     (H) 05/23/2024    BUN 18 05/23/2024    CREATININE 1.01 05/23/2024     Lab Results   Component Value Date    INR 1.19 05/23/2024    INR 0.98 05/13/2024    INR 1.0 05/09/2024    PROTIME 15.0 (H) 05/23/2024    PROTIME 13.2 05/13/2024    PROTIME 13.6 01/20/2021        Blood Culture: No results found for: \"BLOODCX\",   Urinalysis:   Lab Results   Component Value Date    COLORU yellow 10/19/2021    CLARITYU clear 10/19/2021    LEUKOCYTESUR neg 10/19/2021    NITRITE neg 10/19/2021    GLUCOSEU neg 10/19/2021    KETONESU neg 10/19/2021    BILIRUBINUR neg 10/19/2021    BLOODU trace 10/19/2021   ,   Urine Culture: No results found for: \"URINECX\", " "  Wound Culure: No results found for: \"WOUNDCULT\"    Medications:  Current Facility-Administered Medications   Medication Dose Route Frequency    acetaminophen (TYLENOL) tablet 975 mg  975 mg Oral Q6H FAUSTO    amLODIPine (NORVASC) tablet 5 mg  5 mg Oral Daily    aspirin chewable tablet 81 mg  81 mg Oral Daily    atorvastatin (LIPITOR) tablet 40 mg  40 mg Oral QPM    ceFAZolin (ANCEF) IVPB (premix in dextrose) 1,000 mg 50 mL  1,000 mg Intravenous Q8H    clopidogrel (PLAVIX) tablet 75 mg  75 mg Oral Daily    heparin (porcine) subcutaneous injection 5,000 Units  5,000 Units Subcutaneous Q8H FAUSTO    labetalol (NORMODYNE) injection 5 mg  5 mg Intravenous Q15 Min PRN    And    hydrALAZINE (APRESOLINE) injection 15 mg  15 mg Intravenous Q15 Min PRN    And    niCARdipine (CARDENE) 25 mg (STANDARD CONCENTRATION) in sodium chloride 0.9% 250 mL  1-15 mg/hr Intravenous Continuous PRN    memantine (NAMENDA) tablet 10 mg  10 mg Oral Daily    multivitamin stress formula tablet 1 tablet  1 tablet Oral Daily    oxyCODONE (ROXICODONE) split tablet 2.5 mg  2.5 mg Oral Q4H PRN    sodium chloride 0.9 % bolus 500 mL  500 mL Intravenous Once PRN    Followed by    [START ON 5/24/2024] phenylephrine (CIELO-SYNEPHRINE) 50 mg (STANDARD CONCENTRATION) in sodium chloride 0.9% 250 mL   mcg/min Intravenous Continuous PRN    propranolol (INDERAL) tablet 10 mg  10 mg Oral TID    sodium chloride 0.9 % infusion  75 mL/hr Intravenous Continuous    tamsulosin (FLOMAX) capsule 0.4 mg  0.4 mg Oral Daily With Dinner         Physical Exam:    General appearance: alert and oriented, in no acute distress  Neurologic: Alert and oriented X 3, normal strength and tone. Normal symmetric reflexes. Normal coordination and gait  Lungs: clear to auscultation bilaterally  Heart: S1, S2 normal  Abdomen: soft, non-tender; bowel sounds normal; no masses,  no organomegaly  Extremities: extremities normal, warm and well-perfused; no cyanosis, clubbing, or " edema    Wound/Incision:  Right neck incision clean, dry, and intact        Pippa Zhu PA-C  5/23/2024

## 2024-05-23 NOTE — ASSESSMENT & PLAN NOTE
82 y/o M presents for elective carotid revascularization for symptomatic ARTURO stenosis w/ recent R hemispheric CVA    S/P Right TCAR 5/22    Plan:  Continue ASA, Plavix, statin  D/C [place]  Void trial  Diet: Regular  OOB/Amb   Tentative discharge home later today

## 2024-05-23 NOTE — UTILIZATION REVIEW
"Initial Clinical Review    Elective Inpatient surgical procedure  Age/Sex: 83 y.o. male  Surgery Date: 5/22/2024   Procedure: Right - ANGIOPLASTY ARTERY CAROTID W/ STENT   Anesthesia:  General anesthesia   Operative Findings: Right TCAR with 5 x 30 Aviator pre-dilation PTA, 9 x 40 Cordis SES     POD#1 Progress Note: Pt doing well. No acute ovn events. extremities normal, warm and well-perfused; no cyanosis, clubbing, or edema. Right neck incision clean, dry, and intact.   Plan: Continue ASA, Plavix, statin. D/C Jannet. Void trial. Mon ABLA. Diet: Regular. OOB/Amb. Tentative discharge home later today.     Admission Orders: Date/Time/Statement:   Admission Orders (From admission, onward)       Ordered        05/22/24 1614  Inpatient Admission  Once                          Orders Placed This Encounter   Procedures    Inpatient Admission     Standing Status:   Standing     Number of Occurrences:   1     Order Specific Question:   Level of Care     Answer:   Level 1 Stepdown [13]     Order Specific Question:   Estimated length of stay     Answer:   Inpatient Only Surgery     Vital Signs: /73   Pulse 82   Temp 98.3 °F (36.8 °C) (Oral)   Resp 18   Ht 5' 9\" (1.753 m)   Wt 76.2 kg (168 lb)   SpO2 95%   BMI 24.81 kg/m²     Pertinent Labs/Diagnostic Test Results:   IR carotid stent    (Results Pending)         Results from last 7 days   Lab Units 05/23/24  0514   WBC Thousand/uL 11.71*   HEMOGLOBIN g/dL 13.6   HEMATOCRIT % 40.5   PLATELETS Thousands/uL 139*         Results from last 7 days   Lab Units 05/23/24  0514 05/17/24  1036   SODIUM mmol/L 142 143   POTASSIUM mmol/L 3.9 4.2   CHLORIDE mmol/L 110* 108   CO2 mmol/L 22 26   ANION GAP mmol/L 10 9   BUN mg/dL 18 13   CREATININE mg/dL 1.01 1.07   EGFR ml/min/1.73sq m 69 69   CALCIUM mg/dL 8.4 9.2     Results from last 7 days   Lab Units 05/17/24  1036   AST U/L 23   ALT U/L 25   ALK PHOS U/L 97   TOTAL PROTEIN g/dL 6.9   ALBUMIN g/dL 4.4   TOTAL BILIRUBIN mg/dL " 1.0         Results from last 7 days   Lab Units 05/23/24  0514 05/17/24  1036   GLUCOSE RANDOM mg/dL 158* 125*         Results from last 7 days   Lab Units 05/17/24  1036   HEMOGLOBIN A1C % 6.6*   EAG mg/dL 143         Results from last 7 days   Lab Units 05/23/24  0514   PROTIME seconds 15.0*   INR  1.19   PTT seconds 33           Diet: Regular Diet  Mobility: Ambulate Vega  DVT Prophylaxis: SCD, Heparin SC      Medications/Pain Control:   Scheduled Medications:  acetaminophen, 975 mg, Oral, Q6H FAUSTO  amLODIPine, 5 mg, Oral, Daily  aspirin, 81 mg, Oral, Daily  atorvastatin, 40 mg, Oral, QPM  clopidogrel, 75 mg, Oral, Daily  heparin (porcine), 5,000 Units, Subcutaneous, Q8H FAUSTO  memantine, 10 mg, Oral, Daily  multivitamin stress formula, 1 tablet, Oral, Daily  propranolol, 10 mg, Oral, TID  tamsulosin, 0.4 mg, Oral, Daily With Dinner      Continuous IV Infusions:      PRN Meds:  labetalol, 5 mg, Intravenous, Q15 Min PRN 05/22 x 1   And  hydrALAZINE, 15 mg, Intravenous, Q15 Min PRN   And  niCARdipine, 1-15 mg/hr, Intravenous, Continuous PRN  oxyCODONE, 2.5 mg, Oral, Q4H PRN  sodium chloride, 500 mL, Intravenous, Once PRN   Followed by  [START ON 5/24/2024] phenylephine,  mcg/min, Intravenous, Continuous PRN        Network Utilization Review Department  ATTENTION: Please call with any questions or concerns to [phone] and carefully listen to the prompts so that you are directed to the right person. All voicemails are confidential.   For Discharge needs, contact Care Management DC Support Team at [phone] opt. 2  Send all requests for admission clinical reviews, approved or denied determinations and any other requests to dedicated fax number below belonging to the campus where the patient is receiving treatment. List of dedicated fax numbers for the Facilities:  FACILITY NAME UR FAX NUMBER   ADMISSION DENIALS (Administrative/Medical Necessity) [phone]   DISCHARGE SUPPORT TEAM (NETWORK) [phone]    PARENT CHILD HEALTH (Maternity/NICU/Pediatrics) [phone]   [facility] [phone]   [facility] [phone]   [facility] [phone]   [facility] [phone]   [facility] [phone]   [facility] [phone]   [facility] [phone]   [facility] [phone]   [facility] [phone]   [facility] [phone]   [facility] [phone]   [facility] [phone]

## 2024-05-24 ENCOUNTER — TELEPHONE (OUTPATIENT)
Dept: VASCULAR SURGERY | Facility: CLINIC | Age: 84
End: 2024-05-24

## 2024-05-24 NOTE — TELEPHONE ENCOUNTER
Vascular Nurse Navigator Post Op Phone Call    Post-Discharge phone call attempted to assess patient recovery after vascular surgery will try again later as unable to leave a message as patient's mailbox was full. Will attempt to contact at later date.        Pt's chart was reviewed prior to call and leaving message.    Procedure: Right - ANGIOPLASTY ARTERY CAROTID W/ STENT     Date of Procedure: 5/22/24    Surgeon:   * Sven Barajas, DO - Primary     * Guille Ospina DO - Fellow    Discharge Date: 5/23/24    Discharge Disposition:  Home      Anticoagulation pt was discharged on post op?: Aspirin and Clopidogrel (Plavix)    Statin pt was discharged on post op?: Lipitor (atorvastatin)    Reminded pt of the following in message:    NEXT OFFICE VISIT SCHEDULED:   6/4/24 at 3 pm with RODRIGUEZ Arevalo at [facility]    To contact [facility] with any concerns.

## 2024-05-24 NOTE — UTILIZATION REVIEW
NOTIFICATION OF ADMISSION DISCHARGE   This is a Notification of Discharge from [facility]. Please be advised that this patient has been discharge from our facility. Below you will find the admission and discharge date and time including the patient’s disposition.   UTILIZATION REVIEW CONTACT:  Garrison Patel  Utilization   Network Utilization Review Department  Phone: [phone] x carefully listen to the prompts. All voicemails are confidential.  Email: [email or web address]     ADMISSION INFORMATION  PRESENTATION DATE: 5/22/2024  9:21 AM  OBERVATION ADMISSION DATE:   INPATIENT ADMISSION DATE: 5/22/24  4:14 PM   DISCHARGE DATE: 5/23/2024 12:22 PM   DISPOSITION:Home/Self Care    Network Utilization Review Department  ATTENTION: Please call with any questions or concerns to [phone] and carefully listen to the prompts so that you are directed to the right person. All voicemails are confidential.   For Discharge needs, contact Care Management DC Support Team at [phone] opt. 2  Send all requests for admission clinical reviews, approved or denied determinations and any other requests to dedicated fax number below belonging to the campus where the patient is receiving treatment. List of dedicated fax numbers for the Facilities:  FACILITY NAME UR FAX NUMBER   ADMISSION DENIALS (Administrative/Medical Necessity) [phone]   DISCHARGE SUPPORT TEAM ([facility]) [phone]   PARENT CHILD HEALTH (Maternity/NICU/Pediatrics) [phone]   [facility] [phone]   [facility] [phone]   [facility] [phone]   [facility] [phone]   [facility] [phone]   [facility] [phone]   [facility] [phone]   [facility] [phone]   [facility]  [facility] [phone]   [facility] [phone]   [facility] [phone]   [facility] [phone]

## 2024-05-28 NOTE — TELEPHONE ENCOUNTER
Pt's wife calling to inquire about the missed phone call. Pt informed her he spoke with a nurse regarding PO f/u. Wife stated that she is in charge of his medical communications. Confirmed best CB# [phone]. Please reach back out to the wife to discuss PO care.

## 2024-05-28 NOTE — TELEPHONE ENCOUNTER
Contacted patient's wife Shana.  Reviewed information discussed with patient.  She stated that patient is doing good since discharge and she is giving him all prescribed medications.  Reviewed incision care with her - wash daily with soap and water.  Reviewed activity restrictions with her. All questions answered.  No concerns expressed at this time.

## 2024-05-28 NOTE — TELEPHONE ENCOUNTER
Vascular Nurse Navigator Post Op Call    Procedure: Right - ANGIOPLASTY ARTERY CAROTID W/ STENT      Date of Procedure: 5/22/24     Surgeon:   * Sven Barajas, DO - Primary     * Guille Ospina DO - Fellow     Discharge Date: 5/23/24     Discharge Disposition:  Home    Change in Vision?: No    Change in Speech?: No    Weakness?: No    Uncontrolled Pain?: No    Hoarseness?: No    Trouble Swallowing?: No    Incision Concerns?: No    Anticoagulation pt was discharged on post op?: Aspirin and Clopidogrel (Plavix)    Statin pt was discharged on post op?: Lipitor (atorvastatin)    Bleeding?: No    Fever/chills?: No      Reviewed discharge instructions and incision care with patient.      NEXT OFFICE VISIT SCHEDULED:  6/4/24 at 3 pm with RODRIGUEZ Arevalo at [facility]     Transportation Available?: Yes      Any further questions/concerns?    Patient stated that he is doing good since discharge.  Reviewed incision care with him - wash daily with soap and water.  Reviewed discharge medications - Aspirin and Plavix.  Reviewed activity restrictions with him.  All questions answered.  No concerns expressed at this time.

## 2024-06-04 ENCOUNTER — OFFICE VISIT (OUTPATIENT)
Dept: VASCULAR SURGERY | Facility: CLINIC | Age: 84
End: 2024-06-04

## 2024-06-04 VITALS
HEIGHT: 69 IN | HEART RATE: 78 BPM | OXYGEN SATURATION: 98 % | DIASTOLIC BLOOD PRESSURE: 66 MMHG | WEIGHT: 166 LBS | SYSTOLIC BLOOD PRESSURE: 122 MMHG | BODY MASS INDEX: 24.59 KG/M2

## 2024-06-04 DIAGNOSIS — I63.9 STROKE (HCC): ICD-10-CM

## 2024-06-04 DIAGNOSIS — I65.23 BILATERAL CAROTID ARTERY STENOSIS: Primary | ICD-10-CM

## 2024-06-04 PROCEDURE — 99024 POSTOP FOLLOW-UP VISIT: CPT

## 2024-06-04 RX ORDER — BUPROPION HYDROCHLORIDE 150 MG/1
TABLET ORAL
COMMUNITY
Start: 2024-03-26

## 2024-06-04 RX ORDER — CLOPIDOGREL BISULFATE 75 MG/1
75 TABLET ORAL DAILY
Qty: 30 TABLET | Refills: 2 | Status: SHIPPED | OUTPATIENT
Start: 2024-06-04 | End: 2024-09-02

## 2024-06-04 NOTE — PROGRESS NOTES
Ambulatory Visit  Name: Joe Aviles      : 1940      MRN: [id number]  Encounter Provider: RODRIGUEZ Streeter  Encounter Date: 2024   Enc[facility] department: [facility]    Assessment & Plan   1. Bilateral carotid artery stenosis  Assessment & Plan:  Patient is denying any specific concerns postoperatively.  He reports that he is feeling well and is denying any fever, chills, redness, warmth, or drainage from his right chest and left groin incisions.  Patient is denying any new TIA/CVA symptoms (amaurosis fugax, slurred speech, facial droop, dysphagia, unilateral weakness, headache).  He does note fading bruising to his chest and left groin.  He is denying any claudication, rest pain, or tissue loss to his left leg.     Right neck and left groin incisions are well-approximated and nearly healed.  There is mild fading ecchymosis to right chest wall and moderate fading ecchymosis to left groin.  Left groin puncture site is soft and nontender.    Plan:  -Patient may continue to wash incisions daily with soap and water and pat dry.  -Continue medical optimization with aspirin, Plavix, and atorvastatin at this time.  -Patient instructed to notify the office should he develop any redness, warmth, fever, chills, or drainage from his incision sites.  -Patient instructed to report to the emergency department should he develop any new TIA/CVA symptoms..  -Patient reports that he will be going to [facility] tomorrow with his family. I informed patient that he should not go on any roller coasters or rides with sudden, abrupt direction changes.  -Carotid duplex in approximately 4 weeks, 6 weeks postop.  -Patient will follow-up with Dr. Barajas after his carotid duplex.  Orders:  -     VAS carotid complete study; Future; Expected date: 2024  2. Stroke (HCC)  -     clopidogrel (PLAVIX) 75 mg tablet; Take 1 tablet (75 mg total) by mouth daily      History of Present Illness     Joe GUSMAN  Stefan is a 83 y.o. male, current smoker, with PMH HTN, dementia, CVA, and bilateral carotid artery stenosis.  Patient is presenting to the vascular surgery office for initial postop evaluation following right TCAR 5/22/2024 (Karl) due to recurrent left upper extremity weakness and numbness.  He is accompanied by his wife.    Patient is denying any specific concerns postoperatively.  He reports that he is feeling well and is denying any fever, chills, redness, warmth, or drainage from his right chest and left groin incisions.  Patient is denying any new TIA/CVA symptoms (amaurosis fugax, slurred speech, facial droop, dysphagia, unilateral weakness, headache).  He does note fading bruising to his chest and left groin.  He is denying any claudication, rest pain, or tissue loss to his left leg.     Review of Systems   Constitutional: Negative.  Negative for chills and fever.   HENT: Negative.  Negative for trouble swallowing.    Eyes: Negative.  Negative for visual disturbance.   Respiratory: Negative.  Negative for shortness of breath.    Cardiovascular: Negative.  Negative for chest pain and leg swelling.   Gastrointestinal: Negative.    Endocrine: Negative.    Genitourinary: Negative.    Musculoskeletal: Negative.    Skin: Negative.  Negative for color change, pallor and wound.   Allergic/Immunologic: Negative.    Neurological:  Positive for dizziness. Negative for facial asymmetry, weakness, numbness and headaches.   Hematological:  Bruises/bleeds easily.   Psychiatric/Behavioral: Negative.       Pertinent Medical History   Past Medical History:   Diagnosis Date    BPH (benign prostatic hyperplasia)     Colon polyp     Dementia (HCC)     Diverticulitis     Hypertension     Macular degeneration     Stroke (HCC)      Medical History Reviewed by provider this encounter:       Current Outpatient Medications on File Prior to Visit   Medication Sig Dispense Refill    acetaminophen (TYLENOL) 325 mg tablet Take 2  tablets (650 mg total) by mouth every 6 (six) hours as needed for mild pain      amLODIPine (NORVASC) 5 mg tablet Take 5 mg by mouth daily      amLODIPine (NORVASC) 5 mg tablet Take 1 tablet (5 mg total) by mouth daily 90 tablet 3    aspirin 81 mg chewable tablet Chew 1 tablet (81 mg total) daily 30 tablet 0    atorvastatin (LIPITOR) 40 mg tablet Take 1 tablet (40 mg total) by mouth every evening 30 tablet 0    buPROPion (WELLBUTRIN XL) 150 mg 24 hr tablet       losartan (COZAAR) 100 MG tablet Take 1 tablet (100 mg total) by mouth daily      memantine (NAMENDA) 10 mg tablet Take 10 mg by mouth daily      Multiple Vitamin (MULTIVITAMIN) capsule Take 1 capsule by mouth daily      Multiple Vitamins-Minerals (OCUVITE PO) Take 1 tablet by mouth daily      propranolol (INDERAL) 10 mg tablet Take 10 mg by mouth 3 (three) times a day      sildenafil (VIAGRA) 25 MG tablet Take 25 mg by mouth daily as needed for erectile dysfunction      tamsulosin (FLOMAX) 0.4 mg Take 0.4 mg by mouth daily with dinner      [DISCONTINUED] clopidogrel (PLAVIX) 75 mg tablet Take 1 tablet (75 mg total) by mouth daily 30 tablet 0    Apoaequorin (PREVAGEN PO) Take 1 Capful by mouth in the morning (Patient not taking: Reported on 5/17/2024)      promethazine-dextromethorphan (PHENERGAN-DM) 6.25-15 mg/5 mL oral syrup Take 5 mL by mouth 4 (four) times a day as needed (Patient not taking: Reported on 6/4/2024)       No current facility-administered medications on file prior to visit.      Social History     Tobacco Use    Smoking status: Every Day     Current packs/day: 0.50     Average packs/day: 0.5 packs/day for 67.4 years (33.7 ttl pk-yrs)     Types: Cigarettes     Start date: 1957    Smokeless tobacco: Never   Vaping Use    Vaping status: Never Used   Substance and Sexual Activity    Alcohol use: Not Currently    Drug use: No    Sexual activity: Not on file     Objective     /66 (BP Location: Left arm, Patient Position: Sitting, Cuff Size:  "Standard)   Pulse 78   Ht 5' 9\" (1.753 m)   Wt 75.3 kg (166 lb)   SpO2 98%   BMI 24.51 kg/m²     Physical Exam  Vitals and nursing note reviewed.   Constitutional:       General: He is not in acute distress.     Appearance: Normal appearance. He is well-developed.   HENT:      Head: Normocephalic and atraumatic.   Eyes:      Conjunctiva/sclera: Conjunctivae normal.   Neck:     Cardiovascular:      Rate and Rhythm: Normal rate and regular rhythm.      Pulses:           Radial pulses are 2+ on the right side and 2+ on the left side.   Pulmonary:      Effort: Pulmonary effort is normal. No respiratory distress.   Musculoskeletal:         General: No swelling or tenderness.      Cervical back: Normal range of motion and neck supple. No tenderness.      Right lower leg: No edema.      Left lower leg: No edema.   Skin:     General: Skin is warm and dry.      Capillary Refill: Capillary refill takes less than 2 seconds.      Findings: Ecchymosis present. No erythema.          Neurological:      General: No focal deficit present.      Mental Status: He is alert and oriented to person, place, and time.      Cranial Nerves: No cranial nerve deficit, dysarthria or facial asymmetry.      Sensory: Sensation is intact.      Motor: Motor function is intact. No weakness.   Psychiatric:         Mood and Affect: Mood normal.         Behavior: Behavior normal.       Administrative Statements   I have spent a total time of 20 minutes on 06/04/24 In caring for this patient including Risks and benefits of tx options, Instructions for management, Patient and family education, Importance of tx compliance, Risk factor reductions, Impressions, Counseling / Coordination of care, Documenting in the medical record, Reviewing / ordering tests, medicine, procedures  , and Obtaining or reviewing history  .        "

## 2024-06-04 NOTE — ASSESSMENT & PLAN NOTE
Patient is denying any specific concerns postoperatively.  He reports that he is feeling well and is denying any fever, chills, redness, warmth, or drainage from his right chest and left groin incisions.  Patient is denying any new TIA/CVA symptoms (amaurosis fugax, slurred speech, facial droop, dysphagia, unilateral weakness, headache).  He does note fading bruising to his chest and left groin.  He is denying any claudication, rest pain, or tissue loss to his left leg.     Right neck and left groin incisions are well-approximated and nearly healed.  There is mild fading ecchymosis to right chest wall and moderate fading ecchymosis to left groin.  Left groin puncture site is soft and nontender.    Plan:  -Patient may continue to wash incisions daily with soap and water and pat dry.  -Continue medical optimization with aspirin, Plavix, and atorvastatin at this time.  -Patient instructed to notify the office should he develop any redness, warmth, fever, chills, or drainage from his incision sites.  -Patient instructed to report to the emergency department should he develop any new TIA/CVA symptoms..  -Patient reports that he will be going to [place] tomorrow with his family. I informed patient that he should not go on any roller coasters or rides with sudden, abrupt direction changes.  -Carotid duplex in approximately 4 weeks, 6 weeks postop.  -Patient will follow-up with Dr. Barajas after his carotid duplex.

## 2024-06-17 DIAGNOSIS — I63.9 STROKE (HCC): ICD-10-CM

## 2024-06-17 RX ORDER — CLOPIDOGREL BISULFATE 75 MG/1
75 TABLET ORAL DAILY
Qty: 90 TABLET | Refills: 1 | Status: ON HOLD | OUTPATIENT
Start: 2024-06-17 | End: 2024-12-14

## 2024-06-17 NOTE — TELEPHONE ENCOUNTER
Reason for call:   [x] Refill   [] Prior Auth  [] Other:     Office:   [] PCP/Provider -   [x] Specialty/Provider - [facility] RODRIG[facility] Hines     Medication: clopidogrel (PLAVIX) 75 mg tablet     Dose/Frequency: Take 1 tablet (75 mg total) by mouth daily     Quantity: 90    Pharmacy: [address]     Does the patient have enough for 3 days?   [] Yes   [x] No - Send as HP to POD

## 2024-06-17 NOTE — TELEPHONE ENCOUNTER
Pt's spouse called to verify refill status for Plavix. Shana was advised refill was approved and sent to pharmacy today 6/17/2024. Shana stated it was sent to wrong pharmacy they don't use [facility] anymore but Pt is out of med so they were going to get at [facility] this time.

## 2024-06-22 ENCOUNTER — HOSPITAL ENCOUNTER (EMERGENCY)
Facility: HOSPITAL | Age: 84
Discharge: HOME | End: 2024-06-22
Attending: EMERGENCY MEDICINE
Payer: COMMERCIAL

## 2024-06-22 VITALS
WEIGHT: 165.57 LBS | RESPIRATION RATE: 16 BRPM | OXYGEN SATURATION: 96 % | HEIGHT: 70 IN | DIASTOLIC BLOOD PRESSURE: 83 MMHG | TEMPERATURE: 98.1 F | BODY MASS INDEX: 23.7 KG/M2 | HEART RATE: 90 BPM | SYSTOLIC BLOOD PRESSURE: 159 MMHG

## 2024-06-22 DIAGNOSIS — E86.0 DEHYDRATION: ICD-10-CM

## 2024-06-22 DIAGNOSIS — R19.7 DIARRHEA, UNSPECIFIED TYPE: Primary | ICD-10-CM

## 2024-06-22 LAB
ALBUMIN SERPL BCG-MCNC: 4.3 G/DL (ref 3.5–5)
ALP SERPL-CCNC: 104 U/L (ref 34–104)
ALT SERPL W P-5'-P-CCNC: 21 U/L (ref 7–52)
ANION GAP SERPL CALCULATED.3IONS-SCNC: 9 MMOL/L (ref 4–13)
AST SERPL W P-5'-P-CCNC: 21 U/L (ref 13–39)
BASOPHILS # BLD AUTO: 0.03 THOUSANDS/ÂΜL (ref 0–0.1)
BASOPHILS NFR BLD AUTO: 0 % (ref 0–1)
BILIRUB SERPL-MCNC: 0.6 MG/DL (ref 0.2–1)
BUN SERPL-MCNC: 27 MG/DL (ref 5–25)
CALCIUM SERPL-MCNC: 8.9 MG/DL (ref 8.4–10.2)
CHLORIDE SERPL-SCNC: 111 MMOL/L (ref 96–108)
CO2 SERPL-SCNC: 21 MMOL/L (ref 21–32)
CREAT SERPL-MCNC: 1.42 MG/DL (ref 0.6–1.3)
EOSINOPHIL # BLD AUTO: 0.22 THOUSAND/ÂΜL (ref 0–0.61)
EOSINOPHIL NFR BLD AUTO: 3 % (ref 0–6)
ERYTHROCYTE [DISTWIDTH] IN BLOOD BY AUTOMATED COUNT: 13.5 % (ref 11.6–15.1)
GFR SERPL CREATININE-BSD FRML MDRD: 45 ML/MIN/1.73SQ M
GLUCOSE SERPL-MCNC: 110 MG/DL (ref 65–140)
HCT VFR BLD AUTO: 45.7 % (ref 36.5–49.3)
HGB BLD-MCNC: 15.1 G/DL (ref 12–17)
IMM GRANULOCYTES # BLD AUTO: 0.02 THOUSAND/UL (ref 0–0.2)
IMM GRANULOCYTES NFR BLD AUTO: 0 % (ref 0–2)
LACTATE SERPL-SCNC: 0.8 MMOL/L (ref 0.5–2)
LIPASE SERPL-CCNC: 18 U/L (ref 11–82)
LYMPHOCYTES # BLD AUTO: 1.49 THOUSANDS/ÂΜL (ref 0.6–4.47)
LYMPHOCYTES NFR BLD AUTO: 20 % (ref 14–44)
MAGNESIUM SERPL-MCNC: 2.1 MG/DL (ref 1.9–2.7)
MCH RBC QN AUTO: 31.3 PG (ref 26.8–34.3)
MCHC RBC AUTO-ENTMCNC: 33 G/DL (ref 31.4–37.4)
MCV RBC AUTO: 95 FL (ref 82–98)
MONOCYTES # BLD AUTO: 0.66 THOUSAND/ÂΜL (ref 0.17–1.22)
MONOCYTES NFR BLD AUTO: 9 % (ref 4–12)
NEUTROPHILS # BLD AUTO: 5.18 THOUSANDS/ÂΜL (ref 1.85–7.62)
NEUTS SEG NFR BLD AUTO: 68 % (ref 43–75)
NRBC BLD AUTO-RTO: 0 /100 WBCS
PLATELET # BLD AUTO: 135 THOUSANDS/UL (ref 149–390)
PMV BLD AUTO: 11.9 FL (ref 8.9–12.7)
POTASSIUM SERPL-SCNC: 3.7 MMOL/L (ref 3.5–5.3)
PROT SERPL-MCNC: 7.3 G/DL (ref 6.4–8.4)
RBC # BLD AUTO: 4.82 MILLION/UL (ref 3.88–5.62)
SODIUM SERPL-SCNC: 141 MMOL/L (ref 135–147)
WBC # BLD AUTO: 7.6 THOUSAND/UL (ref 4.31–10.16)

## 2024-06-22 PROCEDURE — 83605 ASSAY OF LACTIC ACID: CPT | Performed by: EMERGENCY MEDICINE

## 2024-06-22 PROCEDURE — 83690 ASSAY OF LIPASE: CPT | Performed by: EMERGENCY MEDICINE

## 2024-06-22 PROCEDURE — 80053 COMPREHEN METABOLIC PANEL: CPT | Performed by: EMERGENCY MEDICINE

## 2024-06-22 PROCEDURE — 83735 ASSAY OF MAGNESIUM: CPT | Performed by: EMERGENCY MEDICINE

## 2024-06-22 PROCEDURE — 96360 HYDRATION IV INFUSION INIT: CPT

## 2024-06-22 PROCEDURE — 85025 COMPLETE CBC W/AUTO DIFF WBC: CPT | Performed by: EMERGENCY MEDICINE

## 2024-06-22 PROCEDURE — 99284 EMERGENCY DEPT VISIT MOD MDM: CPT | Performed by: EMERGENCY MEDICINE

## 2024-06-22 PROCEDURE — 36415 COLL VENOUS BLD VENIPUNCTURE: CPT | Performed by: EMERGENCY MEDICINE

## 2024-06-22 PROCEDURE — 99284 EMERGENCY DEPT VISIT MOD MDM: CPT

## 2024-06-22 RX ORDER — DIPHENOXYLATE HYDROCHLORIDE AND ATROPINE SULFATE 2.5; .025 MG/1; MG/1
1 TABLET ORAL ONCE
Status: COMPLETED | OUTPATIENT
Start: 2024-06-22 | End: 2024-06-22

## 2024-06-22 RX ORDER — DIPHENOXYLATE HYDROCHLORIDE AND ATROPINE SULFATE 2.5; .025 MG/1; MG/1
1 TABLET ORAL 4 TIMES DAILY PRN
Qty: 30 TABLET | Refills: 0 | Status: SHIPPED | OUTPATIENT
Start: 2024-06-22 | End: 2024-07-01

## 2024-06-22 RX ADMIN — DIPHENOXYLATE HYDROCHLORIDE AND ATROPINE SULFATE 1 TABLET: 2.5; .025 TABLET ORAL at 20:42

## 2024-06-22 RX ADMIN — SODIUM CHLORIDE 1000 ML: 0.9 INJECTION, SOLUTION INTRAVENOUS at 20:10

## 2024-06-22 RX ADMIN — SODIUM CHLORIDE 1000 ML: 0.9 INJECTION, SOLUTION INTRAVENOUS at 20:44

## 2024-06-22 NOTE — ED PROVIDER NOTES
History  Chief Complaint   Patient presents with    Diarrhea     Pt started with diarrhea x2 days ago. Denies fevers. Denies nausea or vomiting.      Patient is an 83-year-old male presenting to the emergency department complaining of diarrhea for the past 2 days, he denies any fevers, no nausea or vomiting, no abdominal pain, denies any blood in his stools or his urine, no sick contacts, he does report eating a large jar of salsa shortly before his diarrhea started, no recent antibiotics, patient recently had carotid endarterectomy        Prior to Admission Medications   Prescriptions Last Dose Informant Patient Reported? Taking?   Apoaequorin (PREVAGEN PO)   Yes No   Sig: Take 1 Capful by mouth in the morning   Patient not taking: Reported on 5/17/2024   Multiple Vitamin (MULTIVITAMIN) capsule   Yes No   Sig: Take 1 capsule by mouth daily   Multiple Vitamins-Minerals (OCUVITE PO)   Yes No   Sig: Take 1 tablet by mouth daily   acetaminophen (TYLENOL) 325 mg tablet   No No   Sig: Take 2 tablets (650 mg total) by mouth every 6 (six) hours as needed for mild pain   amLODIPine (NORVASC) 5 mg tablet   Yes No   Sig: Take 5 mg by mouth daily   amLODIPine (NORVASC) 5 mg tablet   No No   Sig: Take 1 tablet (5 mg total) by mouth daily   aspirin 81 mg chewable tablet   No No   Sig: Chew 1 tablet (81 mg total) daily   atorvastatin (LIPITOR) 40 mg tablet   No No   Sig: Take 1 tablet (40 mg total) by mouth every evening   buPROPion (WELLBUTRIN XL) 150 mg 24 hr tablet   Yes No   clopidogrel (PLAVIX) 75 mg tablet   No No   Sig: Take 1 tablet (75 mg total) by mouth daily   losartan (COZAAR) 100 MG tablet   Yes No   Sig: Take 1 tablet (100 mg total) by mouth daily   memantine (NAMENDA) 10 mg tablet   Yes No   Sig: Take 10 mg by mouth daily   promethazine-dextromethorphan (PHENERGAN-DM) 6.25-15 mg/5 mL oral syrup   Yes No   Sig: Take 5 mL by mouth 4 (four) times a day as needed   Patient not taking: Reported on 6/4/2024   propranolol  (INDERAL) 10 mg tablet   Yes No   Sig: Take 10 mg by mouth 3 (three) times a day   sildenafil (VIAGRA) 25 MG tablet   Yes No   Sig: Take 25 mg by mouth daily as needed for erectile dysfunction   tamsulosin (FLOMAX) 0.4 mg   Yes No   Sig: Take 0.4 mg by mouth daily with dinner      Facility-Administered Medications: None       Past Medical History:   Diagnosis Date    BPH (benign prostatic hyperplasia)     Colon polyp     Dementia (HCC)     Diverticulitis     Hypertension     Macular degeneration     Stroke (HCC)        Past Surgical History:   Procedure Laterality Date    COLONOSCOPY      CORONARY ANGIOPLASTY WITH STENT PLACEMENT      CYSTOSCOPY      FL TCAT IV STENT CRV CRTD ART EMBOLIC PROTECJ Right 05/22/2024    Procedure: ANGIOPLASTY ARTERY CAROTID W/ STENT;  Surgeon: Sven Barajas DO;  Location: [facility];  Service: Vascular    PROSTATE SURGERY      TONSILLECTOMY      VASECTOMY         History reviewed. No pertinent family history.  I have reviewed and agree with the history as documented.    E-Cigarette/Vaping    E-Cigarette Use Never User      E-Cigarette/Vaping Substances    Nicotine No     THC No     CBD No     Flavoring No      Social History     Tobacco Use    Smoking status: Every Day     Current packs/day: 0.50     Average packs/day: 0.5 packs/day for 67.5 years (33.7 ttl pk-yrs)     Types: Cigarettes     Start date: 1957    Smokeless tobacco: Never   Vaping Use    Vaping status: Never Used   Substance Use Topics    Alcohol use: Not Currently    Drug use: No       Review of Systems   Constitutional: Negative.    HENT: Negative.     Eyes: Negative.    Respiratory: Negative.     Cardiovascular: Negative.    Gastrointestinal:  Positive for diarrhea.   Endocrine: Negative.    Genitourinary: Negative.    Musculoskeletal: Negative.    Skin: Negative.    Allergic/Immunologic: Negative.    Neurological: Negative.    Hematological: Negative.    Psychiatric/Behavioral: Negative.         Physical  Exam  Physical Exam  Constitutional:       Appearance: Normal appearance. He is well-developed.   HENT:      Head: Normocephalic and atraumatic.   Eyes:      Conjunctiva/sclera: Conjunctivae normal.      Pupils: Pupils are equal, round, and reactive to light.   Cardiovascular:      Rate and Rhythm: Normal rate.   Pulmonary:      Effort: Pulmonary effort is normal.   Abdominal:      Palpations: Abdomen is soft.      Tenderness: There is no abdominal tenderness.   Musculoskeletal:         General: Normal range of motion.      Cervical back: Normal range of motion and neck supple.   Skin:     General: Skin is warm and dry.   Neurological:      Mental Status: He is alert and oriented to person, place, and time.         Vital Signs  ED Triage Vitals [06/22/24 1956]   Temperature Pulse Respirations Blood Pressure SpO2   98.1 °F (36.7 °C) 90 16 159/83 96 %      Temp Source Heart Rate Source Patient Position - Orthostatic VS BP Location FiO2 (%)   Temporal -- -- Right arm --      Pain Score       No Pain           Vitals:    06/22/24 1956   BP: 159/83   Pulse: 90         Visual Acuity      ED Medications  Medications   sodium chloride 0.9 % bolus 1,000 mL (0 mL Intravenous Stopped 6/22/24 2044)   sodium chloride 0.9 % bolus 1,000 mL (0 mL Intravenous Stopped 6/22/24 2118)   diphenoxylate-atropine (LOMOTIL) 2.5-0.025 mg per tablet 1 tablet (1 tablet Oral Given 6/22/24 2042)       Diagnostic Studies  Results Reviewed       Procedure Component Value Units Date/Time    Lactic acid, plasma (w/reflex if result > 2.0) [[id number]]  (Normal) Collected: 06/22/24 2013    Lab Status: Final result Specimen: Blood from Arm, Left Updated: 06/22/24 2046     LACTIC ACID 0.8 mmol/L     Narrative:      Result may be elevated if tourniquet was used during collection.    Comprehensive metabolic panel [[id number]]  (Abnormal) Collected: 06/22/24 2013    Lab Status: Final result Specimen: Blood from Arm, Left Updated: 06/22/24 2036     Sodium 141  mmol/L      Potassium 3.7 mmol/L      Chloride 111 mmol/L      CO2 21 mmol/L      ANION GAP 9 mmol/L      BUN 27 mg/dL      Creatinine 1.42 mg/dL      Glucose 110 mg/dL      Calcium 8.9 mg/dL      AST 21 U/L      ALT 21 U/L      Alkaline Phosphatase 104 U/L      Total Protein 7.3 g/dL      Albumin 4.3 g/dL      Total Bilirubin 0.60 mg/dL      eGFR 45 ml/min/1.73sq m     Narrative:      [employer] guidelines for Chronic Kidney Disease (CKD):     Stage 1 with normal or high GFR (GFR > 90 mL/min/1.73 square meters)    Stage 2 Mild CKD (GFR = 60-89 mL/min/1.73 square meters)    Stage 3A Moderate CKD (GFR = 45-59 mL/min/1.73 square meters)    Stage 3B Moderate CKD (GFR = 30-44 mL/min/1.73 square meters)    Stage 4 Severe CKD (GFR = 15-29 mL/min/1.73 square meters)    Stage 5 End Stage CKD (GFR <15 mL/min/1.73 square meters)  Note: GFR calculation is accurate only with a steady state creatinine    Magnesium [[id number]]  (Normal) Collected: 06/22/24 2013    Lab Status: Final result Specimen: Blood from Arm, Left Updated: 06/22/24 2036     Magnesium 2.1 mg/dL     Lipase [[id number]]  (Normal) Collected: 06/22/24 2013    Lab Status: Final result Specimen: Blood from Arm, Left Updated: 06/22/24 2036     Lipase 18 u/L     CBC and differential [[id number]]  (Abnormal) Collected: 06/22/24 2013    Lab Status: Final result Specimen: Blood from Arm, Left Updated: 06/22/24 2024     WBC 7.60 Thousand/uL      RBC 4.82 Million/uL      Hemoglobin 15.1 g/dL      Hematocrit 45.7 %      MCV 95 fL      MCH 31.3 pg      MCHC 33.0 g/dL      RDW 13.5 %      MPV 11.9 fL      Platelets 135 Thousands/uL      nRBC 0 /100 WBCs      Segmented % 68 %      Immature Grans % 0 %      Lymphocytes % 20 %      Monocytes % 9 %      Eosinophils Relative 3 %      Basophils Relative 0 %      Absolute Neutrophils 5.18 Thousands/µL      Absolute Immature Grans 0.02 Thousand/uL      Absolute Lymphocytes 1.49 Thousands/µL      Absolute  Monocytes 0.66 Thousand/µL      Eosinophils Absolute 0.22 Thousand/µL      Basophils Absolute 0.03 Thousands/µL                    No orders to display              Procedures  Procedures         ED Course  ED Course as of 06/22/24 2125   Sat Jun 22, 2024 2124 Lactic acid, plasma (w/reflex if result > 2.0)   2124 Lipase   2124 Magnesium   2124 CBC and differential(!)   2124 Comprehensive metabolic panel(!)                               SBIRT 22yo+      Flowsheet Row Most Recent Value   Initial Alcohol Screen: US AUDIT-C     1. How often do you have a drink containing alcohol? 0 Filed at: 06/22/2024 2005   2. How many drinks containing alcohol do you have on a typical day you are drinking?  0 Filed at: 06/22/2024 2005   3b. FEMALE Any Age, or MALE 65+: How often do you have 4 or more drinks on one occassion? 0 Filed at: 06/22/2024 2005   Audit-C Score 0 Filed at: 06/22/2024 2005   ZEINA: How many times in the past year have you...    Used an illegal drug or used a prescription medication for non-medical reasons? Never Filed at: 06/22/2024 2005                      Medical Decision Making  Abdominal exam without peritoneal signs.  No evidence of acute abdomen at this time.  Well-appearing.  Given work-up, low suspicion for acute hepatobiliary disease (including acute cholecystitis or cholangitis), acute pancreatitis (negative lipase), PUD (including gastric perforation), acute infectious processes (pneumonia, hepatitis, pyelonephritis), acute appendicitis, vascular catastrophe, bowel obstruction, viscus perforation, ovarian/testicular torsion, or diverticulitis.      Problems Addressed:  Dehydration: acute illness or injury  Diarrhea, unspecified type: acute illness or injury    Amount and/or Complexity of Data Reviewed  Labs: ordered. Decision-making details documented in ED Course.    Risk  OTC drugs.  Prescription drug management.             Disposition  Final diagnoses:   Diarrhea, unspecified type   Dehydration      Time reflects when diagnosis was documented in both MDM as applicable and the Disposition within this note       Time User Action Codes Description Comment    6/22/2024  8:49 PM Socorro Samuel [R19.7] Diarrhea, unspecified type     6/22/2024  8:49 PM Socorro Samuel [E86.0] Dehydration           ED Disposition       ED Disposition   Discharge    Condition   Stable    Date/Time   Sat Jun 22, 2024 2049    Comment   Joe Aviles discharge to home/self care.                   Follow-up Information    None         Discharge Medication List as of 6/22/2024  8:50 PM        START taking these medications    Details   diphenoxylate-atropine (LOMOTIL) 2.5-0.025 mg per tablet Take 1 tablet by mouth 4 (four) times a day as needed for diarrhea for up to 10 days, Starting Sat 6/22/2024, Until Tue 7/2/2024 at 2359, Normal           CONTINUE these medications which have NOT CHANGED    Details   acetaminophen (TYLENOL) 325 mg tablet Take 2 tablets (650 mg total) by mouth every 6 (six) hours as needed for mild pain, Starting Thu 5/23/2024, OTC      !! amLODIPine (NORVASC) 5 mg tablet Take 5 mg by mouth daily, Starting Mon 10/4/2021, Historical Med      !! amLODIPine (NORVASC) 5 mg tablet Take 1 tablet (5 mg total) by mouth daily, Starting Fri 5/17/2024, Normal      Apoaequorin (PREVAGEN PO) Take 1 Capful by mouth in the morning, Historical Med      aspirin 81 mg chewable tablet Chew 1 tablet (81 mg total) daily, Starting Wed 5/15/2024, Normal      atorvastatin (LIPITOR) 40 mg tablet Take 1 tablet (40 mg total) by mouth every evening, Starting Tue 5/14/2024, Normal      buPROPion (WELLBUTRIN XL) 150 mg 24 hr tablet Historical Med      clopidogrel (PLAVIX) 75 mg tablet Take 1 tablet (75 mg total) by mouth daily, Starting Mon 6/17/2024, Until Sat 12/14/2024, Normal      losartan (COZAAR) 100 MG tablet Take 1 tablet (100 mg total) by mouth daily, Starting Thu 5/23/2024, Historical Med      memantine (NAMENDA) 10 mg tablet  Take 10 mg by mouth daily, Starting Mon 8/16/2021, Historical Med      Multiple Vitamin (MULTIVITAMIN) capsule Take 1 capsule by mouth daily, Historical Med      Multiple Vitamins-Minerals (OCUVITE PO) Take 1 tablet by mouth daily, Historical Med      promethazine-dextromethorphan (PHENERGAN-DM) 6.25-15 mg/5 mL oral syrup Take 5 mL by mouth 4 (four) times a day as needed, Starting Tue 10/19/2021, Historical Med      propranolol (INDERAL) 10 mg tablet Take 10 mg by mouth 3 (three) times a day, Historical Med      sildenafil (VIAGRA) 25 MG tablet Take 25 mg by mouth daily as needed for erectile dysfunction, Historical Med      tamsulosin (FLOMAX) 0.4 mg Take 0.4 mg by mouth daily with dinner, Starting Mon 10/4/2021, Historical Med       !! - Potential duplicate medications found. Please discuss with provider.          No discharge procedures on file.    PDMP Review       None            ED Provider  Electronically Signed by             Socorro Samuel DO  06/22/24 4488

## 2024-06-28 ENCOUNTER — APPOINTMENT (EMERGENCY)
Dept: CT IMAGING | Facility: HOSPITAL | Age: 84
DRG: 389 | End: 2024-06-28
Payer: COMMERCIAL

## 2024-06-28 ENCOUNTER — HOSPITAL ENCOUNTER (INPATIENT)
Facility: HOSPITAL | Age: 84
LOS: 3 days | Discharge: HOME | DRG: 389 | End: 2024-07-01
Attending: EMERGENCY MEDICINE | Admitting: INTERNAL MEDICINE
Payer: COMMERCIAL

## 2024-06-28 DIAGNOSIS — K56.609 SBO (SMALL BOWEL OBSTRUCTION) (HCC): Primary | ICD-10-CM

## 2024-06-28 DIAGNOSIS — R10.9 RIGHT FLANK PAIN: ICD-10-CM

## 2024-06-28 DIAGNOSIS — K57.90 DIVERTICULOSIS: ICD-10-CM

## 2024-06-28 LAB
ALBUMIN SERPL BCG-MCNC: 4.3 G/DL (ref 3.5–5)
ALP SERPL-CCNC: 96 U/L (ref 34–104)
ALT SERPL W P-5'-P-CCNC: 26 U/L (ref 7–52)
ANION GAP SERPL CALCULATED.3IONS-SCNC: 7 MMOL/L (ref 4–13)
AST SERPL W P-5'-P-CCNC: 19 U/L (ref 13–39)
BACTERIA UR QL AUTO: ABNORMAL /HPF
BASOPHILS # BLD AUTO: 0.04 THOUSANDS/ÂΜL (ref 0–0.1)
BASOPHILS NFR BLD AUTO: 0 % (ref 0–1)
BILIRUB SERPL-MCNC: 0.86 MG/DL (ref 0.2–1)
BILIRUB UR QL STRIP: NEGATIVE
BUN SERPL-MCNC: 18 MG/DL (ref 5–25)
CALCIUM SERPL-MCNC: 9.5 MG/DL (ref 8.4–10.2)
CAOX CRY URNS QL MICRO: ABNORMAL /HPF
CHLORIDE SERPL-SCNC: 108 MMOL/L (ref 96–108)
CLARITY UR: CLEAR
CO2 SERPL-SCNC: 27 MMOL/L (ref 21–32)
COLOR UR: YELLOW
CREAT SERPL-MCNC: 1.08 MG/DL (ref 0.6–1.3)
EOSINOPHIL # BLD AUTO: 0.13 THOUSAND/ÂΜL (ref 0–0.61)
EOSINOPHIL NFR BLD AUTO: 1 % (ref 0–6)
ERYTHROCYTE [DISTWIDTH] IN BLOOD BY AUTOMATED COUNT: 13.3 % (ref 11.6–15.1)
GFR SERPL CREATININE-BSD FRML MDRD: 64 ML/MIN/1.73SQ M
GLUCOSE SERPL-MCNC: 132 MG/DL (ref 65–140)
GLUCOSE UR STRIP-MCNC: NEGATIVE MG/DL
HCT VFR BLD AUTO: 45 % (ref 36.5–49.3)
HGB BLD-MCNC: 15 G/DL (ref 12–17)
HGB UR QL STRIP.AUTO: ABNORMAL
IMM GRANULOCYTES # BLD AUTO: 0.08 THOUSAND/UL (ref 0–0.2)
IMM GRANULOCYTES NFR BLD AUTO: 1 % (ref 0–2)
KETONES UR STRIP-MCNC: ABNORMAL MG/DL
LEUKOCYTE ESTERASE UR QL STRIP: NEGATIVE
LIPASE SERPL-CCNC: 13 U/L (ref 11–82)
LYMPHOCYTES # BLD AUTO: 1.62 THOUSANDS/ÂΜL (ref 0.6–4.47)
LYMPHOCYTES NFR BLD AUTO: 13 % (ref 14–44)
MCH RBC QN AUTO: 31.4 PG (ref 26.8–34.3)
MCHC RBC AUTO-ENTMCNC: 33.3 G/DL (ref 31.4–37.4)
MCV RBC AUTO: 94 FL (ref 82–98)
MONOCYTES # BLD AUTO: 0.78 THOUSAND/ÂΜL (ref 0.17–1.22)
MONOCYTES NFR BLD AUTO: 6 % (ref 4–12)
MUCOUS THREADS UR QL AUTO: ABNORMAL
NEUTROPHILS # BLD AUTO: 9.84 THOUSANDS/ÂΜL (ref 1.85–7.62)
NEUTS SEG NFR BLD AUTO: 79 % (ref 43–75)
NITRITE UR QL STRIP: NEGATIVE
NON-SQ EPI CELLS URNS QL MICRO: ABNORMAL /HPF
NRBC BLD AUTO-RTO: 0 /100 WBCS
PH UR STRIP.AUTO: 6 [PH]
PLATELET # BLD AUTO: 183 THOUSANDS/UL (ref 149–390)
PMV BLD AUTO: 11.8 FL (ref 8.9–12.7)
POTASSIUM SERPL-SCNC: 4 MMOL/L (ref 3.5–5.3)
PROT SERPL-MCNC: 6.8 G/DL (ref 6.4–8.4)
PROT UR STRIP-MCNC: NEGATIVE MG/DL
RBC # BLD AUTO: 4.78 MILLION/UL (ref 3.88–5.62)
RBC #/AREA URNS AUTO: ABNORMAL /HPF
SODIUM SERPL-SCNC: 142 MMOL/L (ref 135–147)
SP GR UR STRIP.AUTO: >=1.03 (ref 1–1.03)
UROBILINOGEN UR QL STRIP.AUTO: 0.2 E.U./DL
WBC # BLD AUTO: 12.49 THOUSAND/UL (ref 4.31–10.16)
WBC #/AREA URNS AUTO: ABNORMAL /HPF

## 2024-06-28 PROCEDURE — 96375 TX/PRO/DX INJ NEW DRUG ADDON: CPT

## 2024-06-28 PROCEDURE — 85025 COMPLETE CBC W/AUTO DIFF WBC: CPT | Performed by: EMERGENCY MEDICINE

## 2024-06-28 PROCEDURE — 80053 COMPREHEN METABOLIC PANEL: CPT | Performed by: EMERGENCY MEDICINE

## 2024-06-28 PROCEDURE — 74177 CT ABD & PELVIS W/CONTRAST: CPT

## 2024-06-28 PROCEDURE — 99285 EMERGENCY DEPT VISIT HI MDM: CPT | Performed by: EMERGENCY MEDICINE

## 2024-06-28 PROCEDURE — 83690 ASSAY OF LIPASE: CPT | Performed by: EMERGENCY MEDICINE

## 2024-06-28 PROCEDURE — 96365 THER/PROPH/DIAG IV INF INIT: CPT

## 2024-06-28 PROCEDURE — 99285 EMERGENCY DEPT VISIT HI MDM: CPT

## 2024-06-28 PROCEDURE — 96366 THER/PROPH/DIAG IV INF ADDON: CPT

## 2024-06-28 PROCEDURE — 93005 ELECTROCARDIOGRAM TRACING: CPT

## 2024-06-28 PROCEDURE — 74176 CT ABD & PELVIS W/O CONTRAST: CPT

## 2024-06-28 PROCEDURE — 81001 URINALYSIS AUTO W/SCOPE: CPT | Performed by: EMERGENCY MEDICINE

## 2024-06-28 PROCEDURE — 36415 COLL VENOUS BLD VENIPUNCTURE: CPT | Performed by: EMERGENCY MEDICINE

## 2024-06-28 PROCEDURE — 96376 TX/PRO/DX INJ SAME DRUG ADON: CPT

## 2024-06-28 RX ORDER — ENOXAPARIN SODIUM 100 MG/ML
40 INJECTION SUBCUTANEOUS DAILY
Status: DISCONTINUED | OUTPATIENT
Start: 2024-06-29 | End: 2024-07-01 | Stop reason: HOSPADM

## 2024-06-28 RX ORDER — ONDANSETRON 2 MG/ML
4 INJECTION INTRAMUSCULAR; INTRAVENOUS EVERY 6 HOURS PRN
Status: DISCONTINUED | OUTPATIENT
Start: 2024-06-28 | End: 2024-07-01 | Stop reason: HOSPADM

## 2024-06-28 RX ORDER — FENTANYL CITRATE 50 UG/ML
50 INJECTION, SOLUTION INTRAMUSCULAR; INTRAVENOUS ONCE
Status: COMPLETED | OUTPATIENT
Start: 2024-06-28 | End: 2024-06-28

## 2024-06-28 RX ORDER — ONDANSETRON 2 MG/ML
4 INJECTION INTRAMUSCULAR; INTRAVENOUS ONCE
Status: COMPLETED | OUTPATIENT
Start: 2024-06-28 | End: 2024-06-28

## 2024-06-28 RX ORDER — DEXTROSE MONOHYDRATE AND SODIUM CHLORIDE 5; .9 G/100ML; G/100ML
125 INJECTION, SOLUTION INTRAVENOUS CONTINUOUS
Status: DISCONTINUED | OUTPATIENT
Start: 2024-06-28 | End: 2024-06-29

## 2024-06-28 RX ADMIN — DEXTROSE AND SODIUM CHLORIDE 125 ML/HR: 5; .9 INJECTION, SOLUTION INTRAVENOUS at 22:59

## 2024-06-28 RX ADMIN — ONDANSETRON 4 MG: 2 INJECTION INTRAMUSCULAR; INTRAVENOUS at 15:35

## 2024-06-28 RX ADMIN — Medication 1 SPRAY: at 23:02

## 2024-06-28 RX ADMIN — IOHEXOL 50 ML: 240 INJECTION, SOLUTION INTRATHECAL; INTRAVASCULAR; INTRAVENOUS; ORAL at 19:51

## 2024-06-28 RX ADMIN — ONDANSETRON 4 MG: 2 INJECTION INTRAMUSCULAR; INTRAVENOUS at 19:19

## 2024-06-28 RX ADMIN — FENTANYL CITRATE 50 MCG: 50 INJECTION INTRAMUSCULAR; INTRAVENOUS at 15:35

## 2024-06-28 RX ADMIN — SODIUM CHLORIDE, SODIUM LACTATE, POTASSIUM CHLORIDE, AND CALCIUM CHLORIDE 1000 ML: .6; .31; .03; .02 INJECTION, SOLUTION INTRAVENOUS at 15:37

## 2024-06-28 RX ADMIN — IOHEXOL 100 ML: 350 INJECTION, SOLUTION INTRAVENOUS at 19:52

## 2024-06-28 NOTE — ED PROVIDER NOTES
History  Chief Complaint   Patient presents with    Flank Pain     Pt arrives from home with c/o right flank pain, loss of appetite, and possible dehydration xfew days.        History provided by:  Medical records, patient and significant other  Flank Pain  Pain location:  R flank  Pain quality: dull and pressure    Pain radiates to:  RUQ  Pain severity:  Mild  Onset quality:  Gradual  Duration:  1 day  Timing:  Constant  Progression:  Unchanged  Chronicity:  New  Context comment:  Patient reports right flank pain radiating to the right upper quadrant last evening associated with some mild nausea and constipation.  Patient was recently seen for diarrhea last week, placed on Imodium, diarrhea has resolved.  No BM for 2 days.  Relieved by:  Nothing  Worsened by:  Nothing  Ineffective treatments:  None tried  Associated symptoms: constipation and nausea    Associated symptoms: no chest pain, no chills, no cough, no dysuria, no fatigue, no fever, no hematuria, no shortness of breath, no sore throat and no vomiting        Prior to Admission Medications   Prescriptions Last Dose Informant Patient Reported? Taking?   Apoaequorin (PREVAGEN PO)   Yes No   Sig: Take 1 Capful by mouth in the morning   Patient not taking: Reported on 5/17/2024   Multiple Vitamin (MULTIVITAMIN) capsule   Yes No   Sig: Take 1 capsule by mouth daily   Multiple Vitamins-Minerals (OCUVITE PO)   Yes No   Sig: Take 1 tablet by mouth daily   acetaminophen (TYLENOL) 325 mg tablet   No No   Sig: Take 2 tablets (650 mg total) by mouth every 6 (six) hours as needed for mild pain   amLODIPine (NORVASC) 5 mg tablet   Yes No   Sig: Take 5 mg by mouth daily   amLODIPine (NORVASC) 5 mg tablet   No No   Sig: Take 1 tablet (5 mg total) by mouth daily   aspirin 81 mg chewable tablet   No No   Sig: Chew 1 tablet (81 mg total) daily   atorvastatin (LIPITOR) 40 mg tablet   No No   Sig: Take 1 tablet (40 mg total) by mouth every evening   buPROPion (WELLBUTRIN XL) 150  mg 24 hr tablet   Yes No   clopidogrel (PLAVIX) 75 mg tablet   No No   Sig: Take 1 tablet (75 mg total) by mouth daily   diphenoxylate-atropine (LOMOTIL) 2.5-0.025 mg per tablet   No No   Sig: Take 1 tablet by mouth 4 (four) times a day as needed for diarrhea for up to 10 days   losartan (COZAAR) 100 MG tablet   Yes No   Sig: Take 1 tablet (100 mg total) by mouth daily   memantine (NAMENDA) 10 mg tablet   Yes No   Sig: Take 10 mg by mouth daily   promethazine-dextromethorphan (PHENERGAN-DM) 6.25-15 mg/5 mL oral syrup   Yes No   Sig: Take 5 mL by mouth 4 (four) times a day as needed   Patient not taking: Reported on 6/4/2024   propranolol (INDERAL) 10 mg tablet   Yes No   Sig: Take 10 mg by mouth 3 (three) times a day   sildenafil (VIAGRA) 25 MG tablet   Yes No   Sig: Take 25 mg by mouth daily as needed for erectile dysfunction   tamsulosin (FLOMAX) 0.4 mg   Yes No   Sig: Take 0.4 mg by mouth daily with dinner      Facility-Administered Medications: None       Past Medical History:   Diagnosis Date    BPH (benign prostatic hyperplasia)     Colon polyp     Dementia (HCC)     Diverticulitis     Hypertension     Macular degeneration     Stroke (HCC)        Past Surgical History:   Procedure Laterality Date    COLONOSCOPY      CORONARY ANGIOPLASTY WITH STENT PLACEMENT      CYSTOSCOPY      IA TCAT IV STENT CRV CRTD ART EMBOLIC PROTECJ Right 05/22/2024    Procedure: ANGIOPLASTY ARTERY CAROTID W/ STENT;  Surgeon: Sven Barajas DO;  Location: [facility];  Service: Vascular    PROSTATE SURGERY      TONSILLECTOMY      VASECTOMY         History reviewed. No pertinent family history.  I have reviewed and agree with the history as documented.    E-Cigarette/Vaping    E-Cigarette Use Never User      E-Cigarette/Vaping Substances    Nicotine No     THC No     CBD No     Flavoring No      Social History     Tobacco Use    Smoking status: Every Day     Current packs/day: 0.50     Average packs/day: 0.5 packs/day for 67.5 years  (33.7 ttl pk-yrs)     Types: Cigarettes     Start date: 1957    Smokeless tobacco: Never   Vaping Use    Vaping status: Never Used   Substance Use Topics    Alcohol use: Not Currently    Drug use: No       Review of Systems   Constitutional:  Negative for appetite change, chills, fatigue and fever.   HENT:  Negative for ear pain, rhinorrhea, sore throat and trouble swallowing.    Eyes:  Negative for pain, discharge and visual disturbance.   Respiratory:  Negative for cough, chest tightness and shortness of breath.    Cardiovascular:  Negative for chest pain and palpitations.   Gastrointestinal:  Positive for constipation and nausea. Negative for abdominal pain and vomiting.   Endocrine: Negative for polydipsia, polyphagia and polyuria.   Genitourinary:  Positive for flank pain. Negative for difficulty urinating, dysuria, hematuria and testicular pain.   Musculoskeletal:  Negative for arthralgias and back pain.   Skin:  Negative for color change and rash.   Allergic/Immunologic: Negative for immunocompromised state.   Neurological:  Negative for dizziness, seizures, syncope, weakness and headaches.   Hematological:  Negative for adenopathy.   Psychiatric/Behavioral:  Negative for confusion and dysphoric mood.    All other systems reviewed and are negative.      Physical Exam  Physical Exam  Vitals and nursing note reviewed.   Constitutional:       General: He is not in acute distress.     Appearance: Normal appearance. He is not ill-appearing, toxic-appearing or diaphoretic.   HENT:      Head: Normocephalic and atraumatic.      Nose: Nose normal. No congestion or rhinorrhea.      Mouth/Throat:      Mouth: Mucous membranes are moist.      Pharynx: Oropharynx is clear. No oropharyngeal exudate or posterior oropharyngeal erythema.   Eyes:      General:         Right eye: No discharge.         Left eye: No discharge.   Cardiovascular:      Rate and Rhythm: Normal rate and regular rhythm.      Pulses: Normal pulses.       Heart sounds: Normal heart sounds. No murmur heard.     No gallop.   Pulmonary:      Effort: Pulmonary effort is normal. No respiratory distress.      Breath sounds: Normal breath sounds. No stridor. No wheezing, rhonchi or rales.   Chest:      Chest wall: No tenderness.   Abdominal:      General: Bowel sounds are normal. There is distension.      Palpations: Abdomen is soft. There is no mass.      Tenderness: There is no abdominal tenderness. There is right CVA tenderness. There is no left CVA tenderness, guarding or rebound.      Hernia: No hernia is present.      Comments: Mildly softly distended abdomen   Musculoskeletal:         General: Normal range of motion.      Cervical back: Normal range of motion and neck supple.   Skin:     General: Skin is warm and dry.      Capillary Refill: Capillary refill takes less than 2 seconds.      Findings: Rash present.      Comments: Faint nonraised macular rash to the bilateral flank regions   Neurological:      General: No focal deficit present.      Mental Status: He is alert and oriented to person, place, and time.      Cranial Nerves: No cranial nerve deficit.      Sensory: No sensory deficit.      Motor: No weakness.      Coordination: Coordination normal.      Gait: Gait normal.      Deep Tendon Reflexes: Reflexes normal.   Psychiatric:         Mood and Affect: Mood normal.         Behavior: Behavior normal.         Thought Content: Thought content normal.         Judgment: Judgment normal.         Vital Signs  ED Triage Vitals [06/28/24 1525]   Temperature Pulse Respirations Blood Pressure SpO2   98.3 °F (36.8 °C) 77 18 143/83 96 %      Temp Source Heart Rate Source Patient Position - Orthostatic VS BP Location FiO2 (%)   Temporal Monitor Lying Right arm --      Pain Score       6           Vitals:    06/28/24 1830 06/28/24 1900 06/28/24 1930 06/28/24 2030   BP: 155/77 166/93 167/83 (!) 170/103   Pulse: 76 79 91 87   Patient Position - Orthostatic VS:              Visual Acuity      ED Medications  Medications   lactated ringers bolus 1,000 mL (0 mL Intravenous Stopped 6/28/24 1825)   fentaNYL injection 50 mcg (50 mcg Intravenous Given 6/28/24 1535)   ondansetron (ZOFRAN) injection 4 mg (4 mg Intravenous Given 6/28/24 1535)   ondansetron (ZOFRAN) injection 4 mg (4 mg Intravenous Given 6/28/24 1919)   iohexol (OMNIPAQUE) 350 MG/ML injection (SINGLE-DOSE) 100 mL (100 mL Intravenous Given 6/28/24 1952)   iohexol (OMNIPAQUE) 240 MG/ML solution 50 mL (50 mL Oral Given 6/28/24 1951)       Diagnostic Studies  Results Reviewed       Procedure Component Value Units Date/Time    Urine Microscopic [[id number]]  (Abnormal) Collected: 06/28/24 1937    Lab Status: Final result Specimen: Urine, Clean Catch Updated: 06/28/24 2007     RBC, UA 2-4 /hpf      WBC, UA 0-1 /hpf      Epithelial Cells None Seen /hpf      Bacteria, UA Occasional /hpf      Ca Oxalate Evelyn, UA Occasional /hpf      MUCUS THREADS Occasional    UA w Reflex to Microscopic w Reflex to Culture [[id number]]  (Abnormal) Collected: 06/28/24 1937    Lab Status: Final result Specimen: Urine, Clean Catch Updated: 06/28/24 1948     Color, UA Yellow     Clarity, UA Clear     Specific Gravity, UA >=1.030     pH, UA 6.0     Leukocytes, UA Negative     Nitrite, UA Negative     Protein, UA Negative mg/dl      Glucose, UA Negative mg/dl      Ketones, UA Trace mg/dl      Urobilinogen, UA 0.2 E.U./dl      Bilirubin, UA Negative     Occult Blood, UA Trace-Intact    Comprehensive metabolic panel [[id number]] Collected: 06/28/24 1538    Lab Status: Final result Specimen: Blood from Arm, Right Updated: 06/28/24 1600     Sodium 142 mmol/L      Potassium 4.0 mmol/L      Chloride 108 mmol/L      CO2 27 mmol/L      ANION GAP 7 mmol/L      BUN 18 mg/dL      Creatinine 1.08 mg/dL      Glucose 132 mg/dL      Calcium 9.5 mg/dL      AST 19 U/L      ALT 26 U/L      Alkaline Phosphatase 96 U/L      Total Protein 6.8 g/dL      Albumin 4.3 g/dL       Total Bilirubin 0.86 mg/dL      eGFR 64 ml/min/1.73sq m     Narrative:      National Kid[employer] Disease Foundation guidelines for Chronic Kidney Disease (CKD):     Stage 1 with normal or high GFR (GFR > 90 mL/min/1.73 square meters)    Stage 2 Mild CKD (GFR = 60-89 mL/min/1.73 square meters)    Stage 3A Moderate CKD (GFR = 45-59 mL/min/1.73 square meters)    Stage 3B Moderate CKD (GFR = 30-44 mL/min/1.73 square meters)    Stage 4 Severe CKD (GFR = 15-29 mL/min/1.73 square meters)    Stage 5 End Stage CKD (GFR <15 mL/min/1.73 square meters)  Note: GFR calculation is accurate only with a steady state creatinine    Lipase [[id number]]  (Normal) Collected: 06/28/24 1538    Lab Status: Final result Specimen: Blood from Arm, Right Updated: 06/28/24 1600     Lipase 13 u/L     CBC and differential [[id number]]  (Abnormal) Collected: 06/28/24 1538    Lab Status: Final result Specimen: Blood from Arm, Right Updated: 06/28/24 1543     WBC 12.49 Thousand/uL      RBC 4.78 Million/uL      Hemoglobin 15.0 g/dL      Hematocrit 45.0 %      MCV 94 fL      MCH 31.4 pg      MCHC 33.3 g/dL      RDW 13.3 %      MPV 11.8 fL      Platelets 183 Thousands/uL      nRBC 0 /100 WBCs      Segmented % 79 %      Immature Grans % 1 %      Lymphocytes % 13 %      Monocytes % 6 %      Eosinophils Relative 1 %      Basophils Relative 0 %      Absolute Neutrophils 9.84 Thousands/µL      Absolute Immature Grans 0.08 Thousand/uL      Absolute Lymphocytes 1.62 Thousands/µL      Absolute Monocytes 0.78 Thousand/µL      Eosinophils Absolute 0.13 Thousand/µL      Basophils Absolute 0.04 Thousands/µL                    CT abdomen pelvis with contrast   Final Result by Ania Hernandez MD (06/28 2037)      Findings consistent with high-grade small bowel obstruction with transition point involving an approximately 10 cm segment of small bowel in the lower central abdomen.      Findings discussed with Dr. Briceno at 8:37 p.m., 6/28/2024         Workstation performed:  [id number]         CT renal stone study abdomen pelvis wo contrast   Final Result by Yusuf Bennett MD (06/28 1623)      1.  Bowel dilatation from the esophagus through the mid small bowel with collapsed loops distally and decompressed colon. The appearance is suspicious for a small bowel obstruction, particularly given small bowel feces sign though etiology for    obstruction is not apparent on this CT as a discrete transition point is not seen. There does appear to be mild thickening of the fecalized small bowel.      2.  Mild mesenteric and pelvic fluid, likely related to obstruction.      3.  Indeterminate hepatic lesion, not appearing cystic. Follow-up nonemergent contrast-enhanced MRI is recommended.      4.  Indeterminate hyperdense nodule adjacent to a small left fat-containing inguinal hernia. This is of uncertain etiology and clinical significance though can be reevaluated for size stability and additional characterization on a follow-up ultrasound in    3 months.      5.  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 According to ACR white paper for the management of incidentally detected abdominal vascular findings, for an aorta of this caliber (2.5-2.9 cm) follow-up imaging (e.g. Doppler    ultrasound, CTA abdomen/pelvis) is recommended at a 5 year interval. Reference:  J Am Jose Cruz Radiol 2013;10:789-794.      The study was marked in EPIC for immediate notification.         Workstation performed: [id number]                    Procedures  Procedures         ED Course                               SBIRT 20yo+      Flowsheet Row Most Recent Value   Initial Alcohol Screen: US AUDIT-C     1. How often do you have a drink containing alcohol? 0 Filed at: 06/28/2024 1530   2. How many drinks containing alcohol do you have on a typical day you are drinking?  0 Filed at: 06/28/2024 1530   3b. FEMALE Any Age, or MALE 65+: How often do you have 4 or more drinks on one occassion? 0  Filed at: 06/28/2024 1530   Audit-C Score 0 Filed at: 06/28/2024 1530   ZEINA: How many times in the past year have you...    Used an illegal drug or used a prescription medication for non-medical reasons? Never Filed at: 06/28/2024 1530                      Medical Decision Making  1522: Patient appears well, vital signs reviewed.  Patient had development of right flank pain that radiates into the right upper quadrant starting yesterday evening.  Patient was recently seen here for diarrheal illness.  His diarrhea has resolved.  Patient now feels as though if he is constipated, has not had a bowel movement in the last 2 days.  Benign abdominal exam.  Plan to complete basic labs including urinalysis.  Plan to complete CT abdomen pelvis.  The patient has a faint nonraised erythematous rash to the bilateral flank areas, does not appear to be shingles in nature and is bilateral, nontender.  This appears to be an incidental finding, no oral or ocular lesions, negative Nikolsky sign, no systemic symptoms.    1708: CT and labs reviewed.  D/W gen surgery Dr. Henry for assistance with care.    1721: D/W Dr. Henry, recommends repeating CT with IV/PO contrast.    2020: Repeat CT reviewed with Dr. Henry, recommends NG tube, admit to medicine.  Discussed with hospitalist for admission.    Amount and/or Complexity of Data Reviewed  External Data Reviewed: labs, radiology and notes.     Details: CTA abdomen pelvis 5/4/2024--There are no acute concerning abnormalities.  Labs: ordered.  Radiology: ordered and independent interpretation performed.     Details: CT abdomen pelvis stone study--SBO    CT abd pelvis with IV/PO contrast--SBO, transition point  ECG/medicine tests: ordered and independent interpretation performed.     Details: NSR left axis deviation, 65 bpm, no acute ischemia.    Risk  Prescription drug management.  Decision regarding hospitalization.             Disposition  Final diagnoses:   Right flank pain    Diverticulosis   SBO (small bowel obstruction) (HCC)     Time reflects when diagnosis was documented in both MDM as applicable and the Disposition within this note       Time User Action Codes Description Comment    6/28/2024  3:52 PM Guille Briceno Add [R10.9] Flank pain     6/28/2024  3:52 PM Guille Briceno Remove [R10.9] Flank pain     6/28/2024  3:52 PM Guille Briceno Add [R10.9] Right flank pain     6/28/2024  3:52 PM Guille Briceno Add [K59.00] Constipation     6/28/2024  3:53 PM BricenoGuille raymond Add [K57.90] Diverticulosis     6/28/2024  5:03 PM Guille Briceno Add [K56.609] SBO (small bowel obstruction) (HCC)     6/28/2024  5:03 PM Guille Briceno Modify [R10.9] Right flank pain     6/28/2024  5:03 PM Guille Briceno Modify [K56.609] SBO (small bowel obstruction) (HCC)     6/28/2024  5:03 PM Guille Briceno Remove [K59.00] Constipation           ED Disposition       ED Disposition   Admit    Condition   Stable    Date/Time   Fri Jun 28, 2024 1703    Comment                  Follow-up Information       Follow up With Specialties Details Why Contact Info    Jayme Wolff MD Internal Medicine Schedule an appointment as soon as possible for a visit   [address] [place] [address]  [phone]      Reji Burnette MD Gastroenterology Schedule an appointment as soon as possible for a visit  As needed, If symptoms worsen [address] [facility] [address]  [phone]              Patient's Medications   Discharge Prescriptions    No medications on file       No discharge procedures on file.    PDMP Review       None            ED Provider  Electronically Signed by             Guille Briceno MD  06/28/24 8870

## 2024-06-29 LAB
ANION GAP SERPL CALCULATED.3IONS-SCNC: 7 MMOL/L (ref 4–13)
BASOPHILS # BLD AUTO: 0.03 THOUSANDS/ÂΜL (ref 0–0.1)
BASOPHILS NFR BLD AUTO: 0 % (ref 0–1)
BUN SERPL-MCNC: 16 MG/DL (ref 5–25)
CALCIUM SERPL-MCNC: 8.9 MG/DL (ref 8.4–10.2)
CHLORIDE SERPL-SCNC: 108 MMOL/L (ref 96–108)
CO2 SERPL-SCNC: 23 MMOL/L (ref 21–32)
CREAT SERPL-MCNC: 1.04 MG/DL (ref 0.6–1.3)
EOSINOPHIL # BLD AUTO: 0.14 THOUSAND/ÂΜL (ref 0–0.61)
EOSINOPHIL NFR BLD AUTO: 1 % (ref 0–6)
ERYTHROCYTE [DISTWIDTH] IN BLOOD BY AUTOMATED COUNT: 13.5 % (ref 11.6–15.1)
GFR SERPL CREATININE-BSD FRML MDRD: 67 ML/MIN/1.73SQ M
GLUCOSE SERPL-MCNC: 172 MG/DL (ref 65–140)
HCT VFR BLD AUTO: 42.2 % (ref 36.5–49.3)
HGB BLD-MCNC: 13.8 G/DL (ref 12–17)
IMM GRANULOCYTES # BLD AUTO: 0.05 THOUSAND/UL (ref 0–0.2)
IMM GRANULOCYTES NFR BLD AUTO: 1 % (ref 0–2)
LYMPHOCYTES # BLD AUTO: 1.31 THOUSANDS/ÂΜL (ref 0.6–4.47)
LYMPHOCYTES NFR BLD AUTO: 12 % (ref 14–44)
MCH RBC QN AUTO: 31.4 PG (ref 26.8–34.3)
MCHC RBC AUTO-ENTMCNC: 32.7 G/DL (ref 31.4–37.4)
MCV RBC AUTO: 96 FL (ref 82–98)
MONOCYTES # BLD AUTO: 1.2 THOUSAND/ÂΜL (ref 0.17–1.22)
MONOCYTES NFR BLD AUTO: 11 % (ref 4–12)
NEUTROPHILS # BLD AUTO: 8.26 THOUSANDS/ÂΜL (ref 1.85–7.62)
NEUTS SEG NFR BLD AUTO: 75 % (ref 43–75)
NRBC BLD AUTO-RTO: 0 /100 WBCS
PLATELET # BLD AUTO: 158 THOUSANDS/UL (ref 149–390)
PMV BLD AUTO: 11.2 FL (ref 8.9–12.7)
POTASSIUM SERPL-SCNC: 3.6 MMOL/L (ref 3.5–5.3)
RBC # BLD AUTO: 4.39 MILLION/UL (ref 3.88–5.62)
SODIUM SERPL-SCNC: 138 MMOL/L (ref 135–147)
WBC # BLD AUTO: 10.99 THOUSAND/UL (ref 4.31–10.16)

## 2024-06-29 PROCEDURE — 99232 SBSQ HOSP IP/OBS MODERATE 35: CPT | Performed by: FAMILY MEDICINE

## 2024-06-29 PROCEDURE — 85025 COMPLETE CBC W/AUTO DIFF WBC: CPT | Performed by: INTERNAL MEDICINE

## 2024-06-29 PROCEDURE — 99222 1ST HOSP IP/OBS MODERATE 55: CPT | Performed by: SURGERY

## 2024-06-29 PROCEDURE — 80048 BASIC METABOLIC PNL TOTAL CA: CPT | Performed by: INTERNAL MEDICINE

## 2024-06-29 RX ORDER — SODIUM CHLORIDE 9 MG/ML
50 INJECTION, SOLUTION INTRAVENOUS CONTINUOUS
Status: DISCONTINUED | OUTPATIENT
Start: 2024-06-29 | End: 2024-07-01

## 2024-06-29 RX ORDER — HYDRALAZINE HYDROCHLORIDE 20 MG/ML
5 INJECTION INTRAMUSCULAR; INTRAVENOUS EVERY 6 HOURS PRN
Status: DISCONTINUED | OUTPATIENT
Start: 2024-06-29 | End: 2024-07-01 | Stop reason: HOSPADM

## 2024-06-29 RX ADMIN — ENOXAPARIN SODIUM 40 MG: 40 INJECTION SUBCUTANEOUS at 08:00

## 2024-06-29 RX ADMIN — PIPERACILLIN AND TAZOBACTAM 4.5 G: 36; 4.5 INJECTION, POWDER, FOR SOLUTION INTRAVENOUS at 17:18

## 2024-06-29 RX ADMIN — Medication 1 SPRAY: at 08:00

## 2024-06-29 RX ADMIN — NICOTINE 1 PATCH: 7 PATCH, EXTENDED RELEASE TRANSDERMAL at 08:00

## 2024-06-29 RX ADMIN — Medication 1 SPRAY: at 00:54

## 2024-06-29 RX ADMIN — SODIUM CHLORIDE 100 ML/HR: 0.9 INJECTION, SOLUTION INTRAVENOUS at 14:24

## 2024-06-29 RX ADMIN — TOPICAL ANESTHETIC 1 SPRAY: 200 SPRAY DENTAL; PERIODONTAL at 13:28

## 2024-06-29 RX ADMIN — DEXTROSE AND SODIUM CHLORIDE 125 ML/HR: 5; .9 INJECTION, SOLUTION INTRAVENOUS at 05:21

## 2024-06-29 RX ADMIN — PIPERACILLIN AND TAZOBACTAM 4.5 G: 36; 4.5 INJECTION, POWDER, FOR SOLUTION INTRAVENOUS at 13:22

## 2024-06-29 NOTE — H&P
[facility]  H&P  Name: Joe Aviles 83 y.o. male I MRN: [id number]  Unit/Bed#: -01 I Date of Admission: 6/28/2024   Date of Service: 6/28/2024 I Hospital Day: 0      Assessment & Plan   * Small bowel obstruction (HCC)  Assessment & Plan  Patient presents with abdominal pain, nausea and constipation. He was recently seen in the ER for diarrhea, and was discharged imodium.   He hasn't had any BM for 2 days, has mild nausea,   CT scan shows high grade SBO,   NGT placed  Keep npo  Surgery consulted  IVF for now    PVD (peripheral vascular disease) (HCC)  Assessment & Plan   Patient had carotid stents placed recently , Symptomatic right ICA stenosis. He underwent right TCAR on 5/22/2024 by Dr. Barajas.  on dual antiplatelets  Since NPO will hold them, soon if possible , should resume them      BPH (benign prostatic hyperplasia)  Assessment & Plan  Recent prostate resection      HTN (hypertension)  Assessment & Plan  BP currently stable  Holding his oral meds  Will administer IV antihypertensives as needed  Cont to monitor bp closely    VTE Pharmacologic Prophylaxis:   Moderate Risk (Score 3-4) - Pharmacological DVT Prophylaxis Ordered: enoxaparin (Lovenox).  Code Status: Prior full code  Discussion with family:     Anticipated Length of Stay: Patient will be admitted on an inpatient basis with an anticipated length of stay of greater than 2 midnights secondary to SBO.    Total Time Spent on Date of Encounter in care of patient: 55 mins. This time was spent on one or more of the following: performing physical exam; counseling and coordination of care; obtaining or reviewing history; documenting in the medical record; reviewing/ordering tests, medications or procedures; communicating with other healthcare professionals and discussing with patient's family/caregivers.    Chief Complaint:   Chief Complaint   Patient presents with    Flank Pain     Pt arrives from home with c/o right  flank pain, loss of appetite, and possible dehydration xfew days.          History of Present Illness:  Joe Aviles is a 83 y.o. male with a PMH of HTN, CVA, ICA stenosis s/p TCAR who presents with abdominal pain and nausea. Patient reports he has been having Ri sided flank pain radiating to the RUQ, this started last evening. He also reports nausea . He reports no passage of gas. He was recently seen in the ER for diarrhea last week. He was discharged on imodium. His diarrhea has resolved. In the ED, he underwent a CT scan which findings were consistent with high-grade small bowel obstruction with transition point involving an approximately 10 cm segment of small bowel in the lower central abdomen. Surgery was consulted and recommended NGT and admission for further management.    Review of Systems:  Review of Systems  Negative except above  Past Medical and Surgical History:   Past Medical History:   Diagnosis Date    BPH (benign prostatic hyperplasia)     Colon polyp     Dementia (HCC)     Diverticulitis     Hypertension     Macular degeneration     Stroke (HCC)        Past Surgical History:   Procedure Laterality Date    COLONOSCOPY      CORONARY ANGIOPLASTY WITH STENT PLACEMENT      CYSTOSCOPY      OR TCAT IV STENT CRV CRTD ART EMBOLIC PROTECJ Right 05/22/2024    Procedure: ANGIOPLASTY ARTERY CAROTID W/ STENT;  Surgeon: Sven Barajas DO;  Location: [facility];  Service: Vascular    PROSTATE SURGERY      TONSILLECTOMY      VASECTOMY         Meds/Allergies:  Prior to Admission medications    Medication Sig Start Date End Date Taking? Authorizing Provider   acetaminophen (TYLENOL) 325 mg tablet Take 2 tablets (650 mg total) by mouth every 6 (six) hours as needed for mild pain 5/23/24   Pippa Zhu PA-C   amLODIPine (NORVASC) 5 mg tablet Take 5 mg by mouth daily 10/4/21   Historical Provider, MD   amLODIPine (NORVASC) 5 mg tablet Take 1 tablet (5 mg total) by mouth daily 5/17/24   Kraig Christensen MD    Apoaequorin (PREVAGEN PO) Take 1 Capful by mouth in the morning  Patient not taking: Reported on 5/17/2024    Historical Provider, MD   aspirin 81 mg chewable tablet Chew 1 tablet (81 mg total) daily 5/15/24   Louise Stringer PA-C   atorvastatin (LIPITOR) 40 mg tablet Take 1 tablet (40 mg total) by mouth every evening 5/14/24   Louise Stringer PA-C   buPROPion (WELLBUTRIN XL) 150 mg 24 hr tablet  3/26/24   Historical Provider, MD   clopidogrel (PLAVIX) 75 mg tablet Take 1 tablet (75 mg total) by mouth daily 6/17/24 12/14/24  RODRIGUEZ Streeter   diphenoxylate-atropine (LOMOTIL) 2.5-0.025 mg per tablet Take 1 tablet by mouth 4 (four) times a day as needed for diarrhea for up to 10 days 6/22/24 7/2/24  Socorro Samuel DO   losartan (COZAAR) 100 MG tablet Take 1 tablet (100 mg total) by mouth daily 5/23/24   Pippa Zhu PA-C   memantine (NAMENDA) 10 mg tablet Take 10 mg by mouth daily 8/16/21   Historical Provider, MD   Multiple Vitamin (MULTIVITAMIN) capsule Take 1 capsule by mouth daily    Historical Provider, MD   Multiple Vitamins-Minerals (OCUVITE PO) Take 1 tablet by mouth daily    Historical Provider, MD   promethazine-dextromethorphan (PHENERGAN-DM) 6.25-15 mg/5 mL oral syrup Take 5 mL by mouth 4 (four) times a day as needed  Patient not taking: Reported on 6/4/2024 10/19/21   Historical Provider, MD   propranolol (INDERAL) 10 mg tablet Take 10 mg by mouth 3 (three) times a day    Historical Provider, MD   sildenafil (VIAGRA) 25 MG tablet Take 25 mg by mouth daily as needed for erectile dysfunction    Historical Provider, MD   tamsulosin (FLOMAX) 0.4 mg Take 0.4 mg by mouth daily with dinner 10/4/21   Historical Provider, MD     I have reviewed home medications with patient personally.    Allergies: No Known Allergies    Social History:  Marital Status: /Civil Union     Substance Use History:   Social History     Substance and Sexual Activity   Alcohol Use Not Currently     Social  "History     Tobacco Use   Smoking Status Every Day    Current packs/day: 0.50    Average packs/day: 0.5 packs/day for 67.5 years (33.7 ttl pk-yrs)    Types: Cigarettes    Start date: 1957   Smokeless Tobacco Never     Social History     Substance and Sexual Activity   Drug Use No       Family History:  History reviewed. No pertinent family history.    Physical Exam:     Vitals:   Blood Pressure: (!) 172/96 (06/28/24 2158)  Pulse: 86 (06/28/24 2158)  Temperature: 98.2 °F (36.8 °C) (06/28/24 2158)  Temp Source: Temporal (06/28/24 1525)  Respirations: 16 (06/28/24 2158)  Height: 5' 10\" (177.8 cm) (06/28/24 2154)  Weight - Scale: 75.7 kg (166 lb 12.8 oz) (06/28/24 2154)  SpO2: 94 % (06/28/24 2158)    Physical Exam  Constitutional:       General: He is not in acute distress.     Appearance: Normal appearance. He is not ill-appearing.   HENT:      Head: Normocephalic and atraumatic.      Nose:      Comments: NGT     Mouth/Throat:      Mouth: Mucous membranes are dry.   Eyes:      General: No scleral icterus.     Extraocular Movements: Extraocular movements intact.   Cardiovascular:      Rate and Rhythm: Normal rate and regular rhythm.      Pulses: Normal pulses.      Heart sounds: Normal heart sounds. No murmur heard.     No gallop.   Pulmonary:      Effort: Pulmonary effort is normal. No respiratory distress.      Breath sounds: Normal breath sounds. No wheezing or rales.   Abdominal:      General: Abdomen is flat. There is distension.      Palpations: Abdomen is soft.      Tenderness: There is no abdominal tenderness. There is no guarding.      Comments: BS not heard   Musculoskeletal:         General: Normal range of motion.      Cervical back: Neck supple.      Right lower leg: No edema.      Left lower leg: No edema.   Skin:     General: Skin is warm.   Neurological:      General: No focal deficit present.      Mental Status: He is alert and oriented to person, place, and time. Mental status is at baseline. "   Psychiatric:         Mood and Affect: Mood normal.            Additional Data:     Lab Results:  Results from last 7 days   Lab Units 06/28/24  1538   WBC Thousand/uL 12.49*   HEMOGLOBIN g/dL 15.0   HEMATOCRIT % 45.0   PLATELETS Thousands/uL 183   SEGS PCT % 79*   LYMPHO PCT % 13*   MONO PCT % 6   EOS PCT % 1     Results from last 7 days   Lab Units 06/28/24  1538   SODIUM mmol/L 142   POTASSIUM mmol/L 4.0   CHLORIDE mmol/L 108   CO2 mmol/L 27   BUN mg/dL 18   CREATININE mg/dL 1.08   ANION GAP mmol/L 7   CALCIUM mg/dL 9.5   ALBUMIN g/dL 4.3   TOTAL BILIRUBIN mg/dL 0.86   ALK PHOS U/L 96   ALT U/L 26   AST U/L 19   GLUCOSE RANDOM mg/dL 132             Lab Results   Component Value Date    HGBA1C 6.6 (H) 05/17/2024    HGBA1C 6.3 (H) 05/14/2024    HGBA1C 5.9 (H) 03/09/2023     Results from last 7 days   Lab Units 06/22/24 2013   LACTIC ACID mmol/L 0.8       Lines/Drains:  Invasive Devices       Peripheral Intravenous Line  Duration             Peripheral IV 06/28/24 Right Antecubital <1 day              Drain  Duration             NG/OG/Enteral Tube Nasogastric 16 Fr Right nare <1 day                        Imaging: Reviewed radiology reports from this admission including: abdominal/pelvic CT  CT abdomen pelvis with contrast   Final Result by Ania Hernandez MD (06/28 2037)      Findings consistent with high-grade small bowel obstruction with transition point involving an approximately 10 cm segment of small bowel in the lower central abdomen.      Findings discussed with Dr. Briceno at 8:37 p.m., 6/28/2024         Workstation performed: [id number]         CT renal stone study abdomen pelvis wo contrast   Final Result by Yusuf Bennett MD (06/28 1623)      1.  Bowel dilatation from the esophagus through the mid small bowel with collapsed loops distally and decompressed colon. The appearance is suspicious for a small bowel obstruction, particularly given small bowel feces sign though etiology for    obstruction is  not apparent on this CT as a discrete transition point is not seen. There does appear to be mild thickening of the fecalized small bowel.      2.  Mild mesenteric and pelvic fluid, likely related to obstruction.      3.  Indeterminate hepatic lesion, not appearing cystic. Follow-up nonemergent contrast-enhanced MRI is recommended.      4.  Indeterminate hyperdense nodule adjacent to a small left fat-containing inguinal hernia. This is of uncertain etiology and clinical significance though can be reevaluated for size stability and additional characterization on a follow-up ultrasound in    3 months.      5.  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 According to ACR white paper for the management of incidentally detected abdominal vascular findings, for an aorta of this caliber (2.5-2.9 cm) follow-up imaging (e.g. Doppler    ultrasound, CTA abdomen/pelvis) is recommended at a 5 year interval. Reference:  J Am Jose Cruz Radiol 2013;10:789-794.      The study was marked in EPIC for immediate notification.         Workstation performed: [id number]             EKG and Other Studies Reviewed on Admission:   EKG: Personally Reviewed.    ** Please Note: This note has been constructed using a voice recognition system. **

## 2024-06-29 NOTE — ASSESSMENT & PLAN NOTE
BP currently stable  Holding his oral meds  Will administer IV antihypertensives as needed  Cont to monitor bp closely

## 2024-06-29 NOTE — UTILIZATION REVIEW
Initial Clinical Review    Admission: Date/Time/Statement:   Admission Orders (From admission, onward)       Ordered        06/28/24 2036  INPATIENT ADMISSION  Once                          Orders Placed This Encounter   Procedures    INPATIENT ADMISSION     Standing Status:   Standing     Number of Occurrences:   1     Order Specific Question:   Level of Care     Answer:   Med Surg [16]     Order Specific Question:   Estimated length of stay     Answer:   More than 2 Midnights     Order Specific Question:   Certification     Answer:   I certify that inpatient services are medically necessary for this patient for a duration of greater than two midnights. See H&P and MD Progress Notes for additional information about the patient's course of treatment.     ED Arrival Information       Expected   -    Arrival   6/28/2024 15:19    Acuity   Urgent              Means of arrival   Walk-In    Escorted by   Spouse    Service   Hospitalist    Admission type   Emergency              Arrival complaint   Flank pain             Chief Complaint   Patient presents with    Flank Pain     Pt arrives from home with c/o right flank pain, loss of appetite, and possible dehydration xfew days.        Initial Presentation: 83 y.o. male presents to ED from home  with abdominal pain and nausea.  Has  right sided flank pain radiating to  RUQ, started the e vening of admission.  Seen in ED  1 week ag for   diarrhea.  Discharged on  immodium.  Diarrhea  resolved.  CT  shows  SBO.  Additional PMH  is  HTN,  PVD,  CVA, ICA  stenosis, S/P  TCAR. Admit  Ip w ith   Small Bowel Obstruction  and plan is  IVF, surgery consult, NGT, NPO, pain control and hold  all oral meds.         Anticipated Length of Stay/Certification Statement: Patient will be admitted on an inpatient basis with an anticipated length of stay of greater than 2 midnights secondary to SBO.       Date:   6/29   Day 2:   Surgery consult  Continue  NGT/NPO.   1.3  L   output  from NGT  thus far.   Urine output adequate.   Wait signs of bowel function.   Continue  IVF.     Adding  MIRYAM.   May need  gastrograffin challenge pending progress.    Date:   6/30  Day 3: Has surpassed a 2nd midnight with active treatments and services.  Several BM's  overnight.  Denies abdominal pain.    NGT intact.  Started on  MIRYAM  per surgery  in case  of diverticulitis.   NPO.      Continue current meds/treatment plan.    ED Triage Vitals [06/28/24 1525]   Temperature Pulse Respirations Blood Pressure SpO2 Pain Score   98.3 °F (36.8 °C) 77 18 143/83 96 % 6     Weight (last 2 days)       Date/Time Weight    06/28/24 2154 75.7 (166.8)    06/28/24 1525 74.8 (165)            Vital Signs (last 3 days)       Date/Time Temp Pulse Resp BP MAP (mmHg) SpO2 O2 Device Patient Position - Orthostatic VS Nahid Coma Scale Score Pain    06/29/24 07:33:25 98.2 °F (36.8 °C) -- 17 118/72 87 -- -- -- -- --    06/28/24 2310 98.4 °F (36.9 °C) 89 18 168/90 -- 95 % None (Room air) Sitting -- No Pain    06/28/24 2259 -- -- -- -- -- -- -- -- -- No Pain    06/28/24 2230 -- -- -- -- -- -- None (Room air) -- 15 --    06/28/24 21:58:12 98.2 °F (36.8 °C) 86 16 172/96 121 94 % -- -- -- --    06/28/24 2115 -- 87 18 173/87 121 96 % None (Room air) -- -- --    06/28/24 2030 -- 87 18 170/103 127 95 % -- -- -- --    06/28/24 1930 -- 91 -- 167/83 116 95 % -- -- -- --    06/28/24 1900 -- 79 16 166/93 123 95 % -- -- 15 --    06/28/24 1830 -- 76 18 155/77 107 97 % -- -- -- --    06/28/24 1800 -- 72 18 163/79 113 95 % -- -- -- --    06/28/24 1630 -- 64 18 139/67 97 94 % -- -- -- --    06/28/24 1525 98.3 °F (36.8 °C) 77 18 143/83 108 96 % None (Room air) Lying -- 6              Pertinent Labs/Diagnostic Test Results:   Radiology:  CT abdomen pelvis with contrast   Final Interpretation by Ania Hernandez MD (06/28 2037)      Findings consistent with high-grade small bowel obstruction with transition point involving an approximately 10 cm segment of small bowel in  the lower central abdomen.      Findings discussed with Dr. Briceno at 8:37 p.m., 6/28/2024         Workstation performed: [id number]         CT renal stone study abdomen pelvis wo contrast   Final Interpretation by Yusuf Bennett MD (06/28 1623)      1.  Bowel dilatation from the esophagus through the mid small bowel with collapsed loops distally and decompressed colon. The appearance is suspicious for a small bowel obstruction, particularly given small bowel feces sign though etiology for    obstruction is not apparent on this CT as a discrete transition point is not seen. There does appear to be mild thickening of the fecalized small bowel.      2.  Mild mesenteric and pelvic fluid, likely related to obstruction.      3.  Indeterminate hepatic lesion, not appearing cystic. Follow-up nonemergent contrast-enhanced MRI is recommended.      4.  Indeterminate hyperdense nodule adjacent to a small left fat-containing inguinal hernia. This is of uncertain etiology and clinical significance though can be reevaluated for size stability and additional characterization on a follow-up ultrasound in    3 months.      5.  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 According to ACR white paper for the management of incidentally detected abdominal vascular findings, for an aorta of this caliber (2.5-2.9 cm) follow-up imaging (e.g. Doppler    ultrasound, CTA abdomen/pelvis) is recommended at a 5 year interval. Reference:  J Am Jose Cruz Radiol 2013;10:789-794.      The study was marked in EPIC for immediate notification.         Workstation performed: [id number]               Results from last 7 days   Lab Units 06/29/24  0447 06/28/24  1538 06/22/24 2013   WBC Thousand/uL 10.99* 12.49* 7.60   HEMOGLOBIN g/dL 13.8 15.0 15.1   HEMATOCRIT % 42.2 45.0 45.7   PLATELETS Thousands/uL 158 183 135*   TOTAL NEUT ABS Thousands/µL 8.26* 9.84* 5.18         Results from last 7 days   Lab Units 06/29/24 0447  06/28/24  1538 06/22/24 2013   SODIUM mmol/L 138 142 141   POTASSIUM mmol/L 3.6 4.0 3.7   CHLORIDE mmol/L 108 108 111*   CO2 mmol/L 23 27 21   ANION GAP mmol/L 7 7 9   BUN mg/dL 16 18 27*   CREATININE mg/dL 1.04 1.08 1.42*   EGFR ml/min/1.73sq m 67 64 45   CALCIUM mg/dL 8.9 9.5 8.9   MAGNESIUM mg/dL  --   --  2.1     Results from last 7 days   Lab Units 06/28/24  1538 06/22/24 2013   AST U/L 19 21   ALT U/L 26 21   ALK PHOS U/L 96 104   TOTAL PROTEIN g/dL 6.8 7.3   ALBUMIN g/dL 4.3 4.3   TOTAL BILIRUBIN mg/dL 0.86 0.60         Results from last 7 days   Lab Units 06/29/24  0447 06/28/24  1538 06/22/24 2013   GLUCOSE RANDOM mg/dL 172* 132 110                   Results from last 7 days   Lab Units 06/22/24 2013   LACTIC ACID mmol/L 0.8               Results from last 7 days   Lab Units 06/28/24  1538 06/22/24 2013   LIPASE u/L 13 18                 Results from last 7 days   Lab Units 06/28/24  1937   CLARITY UA  Clear   COLOR UA  Yellow   SPEC GRAV UA  >=1.030   PH UA  6.0   GLUCOSE UA mg/dl Negative   KETONES UA mg/dl Trace*   BLOOD UA  Trace-Intact*   PROTEIN UA mg/dl Negative   NITRITE UA  Negative   BILIRUBIN UA  Negative   UROBILINOGEN UA E.U./dl 0.2   LEUKOCYTES UA  Negative   WBC UA /hpf 0-1   RBC UA /hpf 2-4   BACTERIA UA /hpf Occasional   EPITHELIAL CELLS WET PREP /hpf None Seen   MUCUS THREADS  Occasional*         ED Treatment-Medication Administration from 06/28/2024 1518 to 06/28/2024 2152         Date/Time Order Dose Route Action     06/28/2024 1537 lactated ringers bolus 1,000 mL 1,000 mL Intravenous New Bag     06/28/2024 1535 fentaNYL injection 50 mcg 50 mcg Intravenous Given     06/28/2024 1535 ondansetron (ZOFRAN) injection 4 mg 4 mg Intravenous Given     06/28/2024 1919 ondansetron (ZOFRAN) injection 4 mg 4 mg Intravenous Given     06/28/2024 1952 iohexol (OMNIPAQUE) 350 MG/ML injection (SINGLE-DOSE) 100 mL 100 mL Intravenous Given     06/28/2024 1951 iohexol (OMNIPAQUE) 240 MG/ML solution 50  mL 50 mL Oral Given              Present on Admission:   HTN (hypertension)   PVD (peripheral vascular disease) (HCC)   BPH (benign prostatic hyperplasia)      Admitting Diagnosis: Diverticulosis [K57.90]  SBO (small bowel obstruction) (HCC) [K56.609]  Flank pain [R10.9]  Right flank pain [R10.9]  Age/Sex: 83 y.o. male  Admission Orders:  Scheduled Medications:  enoxaparin, 40 mg, Subcutaneous, Daily  nicotine, 1 patch, Transdermal, Daily  IV  zosyn  Q 8 hrs      Continuous IV Infusions:  dextrose 5 % and sodium chloride 0.9 %, 125 mL/hr, Intravenous, Continuous      PRN Meds:  ondansetron, 4 mg, Intravenous, Q6H PRN  phenol, 1 spray, Mouth/Throat, Q2H PRN      Network Utilization Review Department  ATTENTION: Please call with any questions or concerns to [phone] and carefully listen to the prompts so that you are directed to the right person. All voicemails are confidential.   For Discharge needs, contact Care Management DC Support Team at [phone] opt. 2  Send all requests for admission clinical reviews, approved or denied determinations and any other requests to dedicated fax number below belonging to the campus where the patient is receiving treatment. List of dedicated fax numbers for the Facilities:  FACILITY NAME UR FAX NUMBER   ADMISSION DENIALS (Administrative/Medical Necessity) [phone]   DISCHARGE SUPPORT TEAM (NETWORK) [phone]   PARENT CHILD HEALTH (Maternity/NICU/Pediatrics) [phone]   [facility] [phone]   General acute hospital [phone]   [facility] [phone]   [facility] [phone]   [facility] [phone]   [facility] [phone]   [facility] [phone]   [facility] [phone]   [facility] [phone]   [facility]  [facility] [phone]   [facility] [phone]   [facility] [phone]

## 2024-06-29 NOTE — ASSESSMENT & PLAN NOTE
Patient had carotid stents placed recently , Symptomatic right ICA stenosis. He underwent right TCAR on 5/22/2024 by Dr. Barajas.  on dual antiplatelets  Since NPO will hold them, soon if possible , should resume them

## 2024-06-29 NOTE — ASSESSMENT & PLAN NOTE
Patient presents with abdominal pain, nausea and constipation. He was recently seen in the ER for diarrhea, and was discharged imodium.   He hasn't had any BM for 2 days, has mild nausea,   CT scan shows high grade SBO,   NGT placed  Keep npo  Surgery consulted  IVF for now

## 2024-06-29 NOTE — PROGRESS NOTES
[facility]  Progress Note  Name: Joe Aviles I  MRN: [id number]  Unit/Bed#: -01 I Date of Admission: 6/28/2024   Date of Service: 6/29/2024 I Hospital Day: 1    Assessment & Plan   * Small bowel obstruction (HCC)  Assessment & Plan  Patient presents with abdominal pain, nausea and constipation. He was recently seen in the ER for diarrhea, and was discharged imodium.  Patient has known history of diverticulitis/diverticulosis.  No previous abdominal surgery reported except for prostate related surgery which was done via cystoscopy.  No prior history of bowel obstruction.  He hasn't had any BM for 2 days, has mild nausea,   CT scan abd shows Findings consistent with high-grade small bowel obstruction with transition point involving an approximately 10 cm segment of small bowel in the lower central abdomen.   NGT placed  Keep npo  Surgery consulted  IVF for now  Also started antibiotics as per surgery in case it is secondary to diverticulitis    PVD (peripheral vascular disease) (HCC)  Assessment & Plan  Currently stable    BPH (benign prostatic hyperplasia)  Assessment & Plan  Monitor for now    Essential hypertension  Assessment & Plan  Blood Pressure was elevated yesterday secondary to abdominal pain.  Now improving.  Oral antihypertensive agents are currently on hold will place on as needed hydralazine in case his high blood pressures over 180         VTE Pharmacologic Prophylaxis: VTE Score: 4 Moderate Risk (Score 3-4) - Pharmacological DVT Prophylaxis Ordered: enoxaparin (Lovenox).    Mobility:   Basic Mobility Inpatient Raw Score: 22  JH-HLM Goal: 7: Walk 25 feet or more  JH-HLM Achieved: 7: Walk 25 feet or more  JH-HLM Goal achieved. Continue to encourage appropriate mobility.    Patient Centered Rounds: I performed bedside rounds with nursing staff today.   Discussions with Specialists or Other Care Team Provider: [facility]    Education and Discussions with Family /  Patient: Updated  (wife) at bedside.    Total Time Spent on Date of Encounter in care of patient: 35 mins. This time was spent on one or more of the following: performing physical exam; counseling and coordination of care; obtaining or reviewing history; documenting in the medical record; reviewing/ordering tests, medications or procedures; communicating with other healthcare professionals and discussing with patient's family/caregivers.    Current Length of Stay: 1 day(s)  Current Patient Status: Inpatient   Certification Statement: The patient will continue to require additional inpatient hospital stay due to high-grade small bowel obstruction  Discharge Plan: Anticipate discharge in 48-72 hrs to home.    Code Status: Level 1 - Full Code    Subjective:   Patient currently denies any abdominal pain he has NG tube in which is draining brownish liquid material.  No bowel movement for 2 days    Objective:     Vitals:   Temp (24hrs), Av.3 °F (36.8 °C), Min:98.2 °F (36.8 °C), Max:98.4 °F (36.9 °C)    Temp:  [98.2 °F (36.8 °C)-98.4 °F (36.9 °C)] 98.2 °F (36.8 °C)  HR:  [64-91] 89  Resp:  [16-18] 17  BP: (118-173)/() 118/72  SpO2:  [94 %-97 %] 95 %  Body mass index is 23.93 kg/m².     Input and Output Summary (last 24 hours):     Intake/Output Summary (Last 24 hours) at 2024 1332  Last data filed at 2024 1001  Gross per 24 hour   Intake 1000 ml   Output 1825 ml   Net -825 ml       Physical Exam:   Physical Exam  Vitals and nursing note reviewed.   Constitutional:       Appearance: He is ill-appearing.   HENT:      Head: Normocephalic and atraumatic.      Right Ear: External ear normal.      Left Ear: External ear normal.      Nose: Nose normal.      Comments: Ng noted     Mouth/Throat:      Pharynx: Oropharynx is clear.   Eyes:      Pupils: Pupils are equal, round, and reactive to light.   Cardiovascular:      Rate and Rhythm: Normal rate and regular rhythm.      Heart sounds: Normal  heart sounds.   Pulmonary:      Effort: Pulmonary effort is normal.      Breath sounds: Normal breath sounds.   Abdominal:      General: Bowel sounds are normal. There is distension.      Palpations: Abdomen is soft.      Tenderness: There is abdominal tenderness.   Musculoskeletal:         General: Normal range of motion.      Cervical back: Normal range of motion and neck supple.   Skin:     General: Skin is warm and dry.      Capillary Refill: Capillary refill takes less than 2 seconds.   Neurological:      General: No focal deficit present.      Mental Status: He is alert and oriented to person, place, and time.   Psychiatric:         Mood and Affect: Mood normal.            Additional Data:     Labs:  Results from last 7 days   Lab Units 06/29/24  0447   WBC Thousand/uL 10.99*   HEMOGLOBIN g/dL 13.8   HEMATOCRIT % 42.2   PLATELETS Thousands/uL 158   SEGS PCT % 75   LYMPHO PCT % 12*   MONO PCT % 11   EOS PCT % 1     Results from last 7 days   Lab Units 06/29/24  0447 06/28/24  1538   SODIUM mmol/L 138 142   POTASSIUM mmol/L 3.6 4.0   CHLORIDE mmol/L 108 108   CO2 mmol/L 23 27   BUN mg/dL 16 18   CREATININE mg/dL 1.04 1.08   ANION GAP mmol/L 7 7   CALCIUM mg/dL 8.9 9.5   ALBUMIN g/dL  --  4.3   TOTAL BILIRUBIN mg/dL  --  0.86   ALK PHOS U/L  --  96   ALT U/L  --  26   AST U/L  --  19   GLUCOSE RANDOM mg/dL 172* 132                 Results from last 7 days   Lab Units 06/22/24 2013   LACTIC ACID mmol/L 0.8       Lines/Drains:  Invasive Devices       Peripheral Intravenous Line  Duration             Peripheral IV 06/28/24 Right Antecubital <1 day              Drain  Duration             NG/OG/Enteral Tube Nasogastric 16 Fr Right nare <1 day                          Imaging: Reviewed radiology reports from this admission including: abdominal/pelvic CT    Recent Cultures (last 7 days):         Last 24 Hours Medication List:   Current Facility-Administered Medications   Medication Dose Route Frequency Provider Last  Rate    dextrose 5 % and sodium chloride 0.9 %  125 mL/hr Intravenous Continuous Maurisio Garcia  mL/hr (06/29/24 0521)    enoxaparin  40 mg Subcutaneous Daily Maurisio Garcia MD      hydrALAZINE  5 mg Intravenous Q6H PRN Chrissy Evans MD      nicotine  1 patch Transdermal Daily Maurisio Garcia MD      ondansetron  4 mg Intravenous Q6H PRN Maurisio Garcia MD      phenol  1 spray Mouth/Throat Q2H PRN Maurisio Garcia MD      piperacillin-tazobactam  4.5 g Intravenous Once Katrina Elizabeth Billig, PA-C 4.5 g (06/29/24 1322)    Followed by    piperacillin-tazobactam  4.5 g Intravenous Q8H Katrina Elizabeth Billig, PA-C          Today, Patient Was Seen By: Chrissy Evans MD    **Please Note: This note may have been constructed using a voice recognition system.**

## 2024-06-29 NOTE — PLAN OF CARE
Problem: Prexisting or High Potential for Compromised Skin Integrity  Goal: Skin integrity is maintained or improved  Description: INTERVENTIONS:  - Identify patients at risk for skin breakdown  - Assess and monitor skin integrity  - Assess and monitor nutrition and hydration status  - Monitor labs   - Assess for incontinence   - Turn and reposition patient  - Assist with mobility/ambulation  - Relieve pressure over bony prominences  - Avoid friction and shearing  - Provide appropriate hygiene as needed including keeping skin clean and dry  - Evaluate need for skin moisturizer/barrier cream  - Collaborate with interdisciplinary team   - Patient/family teaching  - Consider wound care consult   Outcome: Progressing     Problem: PAIN - ADULT  Goal: Verbalizes/displays adequate comfort level or baseline comfort level  Description: Interventions:  - Encourage patient to monitor pain and request assistance  - Assess pain using appropriate pain scale  - Administer analgesics based on type and severity of pain and evaluate response  - Implement non-pharmacological measures as appropriate and evaluate response  - Consider cultural and social influences on pain and pain management  - Notify physician/advanced practitioner if interventions unsuccessful or patient reports new pain  Outcome: Progressing     Problem: INFECTION - ADULT  Goal: Absence or prevention of progression during hospitalization  Description: INTERVENTIONS:  - Assess and monitor for signs and symptoms of infection  - Monitor lab/diagnostic results  - Monitor all insertion sites, i.e. indwelling lines, tubes, and drains  - Monitor endotracheal if appropriate and nasal secretions for changes in amount and color  - [place] appropriate cooling/warming therapies per order  - Administer medications as ordered  - Instruct and encourage patient and family to use good hand hygiene technique  - Identify and instruct in appropriate isolation precautions for  identified infection/condition  Outcome: Progressing  Goal: Absence of fever/infection during neutropenic period  Description: INTERVENTIONS:  - Monitor WBC    Outcome: Progressing     Problem: SAFETY ADULT  Goal: Patient will remain free of falls  Description: INTERVENTIONS:  - Educate patient/family on patient safety including physical limitations  - Instruct patient to call for assistance with activity   - Consult OT/PT to assist with strengthening/mobility   - Keep Call bell within reach  - Keep bed low and locked with side rails adjusted as appropriate  - Keep care items and personal belongings within reach  - Initiate and maintain comfort rounds  - Make Fall Risk Sign visible to staff  - Offer Toileting every 2 Hours, in advance of need  - Initiate/Maintain bed/chair alarm  - Obtain necessary fall risk management equipment:   - Apply yellow socks and bracelet for high fall risk patients  - Consider moving patient to room near nurses station  Outcome: Progressing  Goal: Maintain or return to baseline ADL function  Description: INTERVENTIONS:  -  Assess patient's ability to carry out ADLs; assess patient's baseline for ADL function and identify physical deficits which impact ability to perform ADLs (bathing, care of mouth/teeth, toileting, grooming, dressing, etc.)  - Assess/evaluate cause of self-care deficits   - Assess range of motion  - Assess patient's mobility; develop plan if impaired  - Assess patient's need for assistive devices and provide as appropriate  - Encourage maximum independence but intervene and supervise when necessary  - Involve family in performance of ADLs  - Assess for home care needs following discharge   - Consider OT consult to assist with ADL evaluation and planning for discharge  - Provide patient education as appropriate  Outcome: Progressing  Goal: Maintains/Returns to pre admission functional level  Description: INTERVENTIONS:  - Perform AM-PAC 6 Click Basic Mobility/ Daily  Activity assessment daily.  - Set and communicate daily mobility goal to care team and patient/family/caregiver.   - Collaborate with rehabilitation services on mobility goals if consulted  - Perform Range of Motion 3 times a day.  - Reposition patient every 2 hours.  - Dangle patient 3 times a day  - Stand patient 3 times a day  - Ambulate patient 3 times a day  - Out of bed to chair 3 times a day   - Out of bed for meals 3 times a day  - Out of bed for toileting  - Record patient progress and toleration of activity level   Outcome: Progressing     Problem: DISCHARGE PLANNING  Goal: Discharge to home or other facility with appropriate resources  Description: INTERVENTIONS:  - Identify barriers to discharge w/patient and caregiver  - Arrange for needed discharge resources and transportation as appropriate  - Identify discharge learning needs (meds, wound care, etc.)  - Arrange for interpretive services to assist at discharge as needed  - Refer to Case Management Department for coordinating discharge planning if the patient needs post-hospital services based on physician/advanced practitioner order or complex needs related to functional status, cognitive ability, or social support system  Outcome: Progressing     Problem: Knowledge Deficit  Goal: Patient/family/caregiver demonstrates understanding of disease process, treatment plan, medications, and discharge instructions  Description: Complete learning assessment and assess knowledge base.  Interventions:  - Provide teaching at level of understanding  - Provide teaching via preferred learning methods  Outcome: Progressing

## 2024-06-29 NOTE — ASSESSMENT & PLAN NOTE
Patient presents with abdominal pain, nausea and constipation. He was recently seen in the ER for diarrhea, and was discharged imodium.  Patient has known history of diverticulitis/diverticulosis.  No previous abdominal surgery reported except for prostate related surgery which was done via cystoscopy.  No prior history of bowel obstruction.  He hasn't had any BM for 2 days, has mild nausea,   CT scan abd shows Findings consistent with high-grade small bowel obstruction with transition point involving an approximately 10 cm segment of small bowel in the lower central abdomen.   NGT placed  Keep npo  Surgery consulted  IVF for now  Also started antibiotics as per surgery in case it is secondary to diverticulitis

## 2024-06-29 NOTE — ASSESSMENT & PLAN NOTE
Blood Pressure was elevated yesterday secondary to abdominal pain.  Now improving.  Oral antihypertensive agents are currently on hold will place on as needed hydralazine in case his high blood pressures over 180

## 2024-06-29 NOTE — CONSULTS
Consultation - General Surgery   Joe Aviles 83 y.o. male MRN: [id number]  Unit/Bed#: -01 Encounter: [id number]    Assessment:  83 y.o. male with findings of small bowel obstruction     CTAP IMPRESSION:  - Findings consistent with high-grade small bowel obstruction with transition point involving an approximately 10 cm segment of small bowel in the lower central abdomen.    - Afebrile, VSS on room air, episodes of HTN   - Leukocytosis 10 (12)  - Hgb 13 (15)   - BMP WNL   - Lactic Acid 0.8    - 1.3 L output from NGT since placement  - Urinating appropriately     - PMHx PVD, BPH, HTN, recently had carotid stents for symptomatic right ICA stenosis   - No prior abdominal surgical hx    Plan:  - Continue NGT, NPO status  - Monitor output, consider gastrograffin challenge tomorrow pending progress  - Await signs of returning bowel function   - IVF hydration  - Will add IV abx in setting of possible inflammation at site of transition point - Zosyn   - Pain/nausea control PRN  - Encourage ambulation   - Co-morbid conditions per primary     History of Present Illness   Chief Complaint: abdominal pain    HPI:  Joe Aviles is a 83 y.o. male with past medical history significant for HTN, PVD, recent carotid stent for symptomatic right ICA stenosis, no abdominal surgical history who initially presented to [facility] ED with complaints of diarrhea on 6/22. He states he was given imodium and had been takin that at home which seemed to help with the problem. About two days ago he noted he was no longer passing any flatus and has not moved his bowels since. Two nights ago he began to experience severe abdominal pain and bloating. Patient states this pain continued to worsen throughout the day and seemed to be no better so he came to the ED. He has never had any pain like this before. Patient reports he was nauseous and vomiting at home. Denies fevers, chills, shortness of breath or chest tightness. At time of exam patient is  feeling much better.  He states his abdomen is much less distended and he has no pain.  He denies any continued nausea or vomiting.  Patient denies moving his bowels or passing flatus since arrival.  He does not feel he has much of an appetite at this time.  Patient and wife are hopeful for a quick recovery as they have plans to travel on July 7.  All questions answered and they are agreeable to plan.    Review of Systems   Constitutional:  Positive for appetite change. Negative for activity change, chills and fever.   HENT: Negative.     Respiratory:  Negative for chest tightness and shortness of breath.    Cardiovascular:  Negative for chest pain and palpitations.   Gastrointestinal:  Positive for abdominal distention, abdominal pain (Resolved at present), constipation, nausea and vomiting.        Initially patient was experiencing diarrhea, was seen in the ED and started on Imodium, has now not moved his bowels x 2 days and typically goes daily.   Genitourinary:  Negative for difficulty urinating, dysuria, frequency, hematuria and urgency.   Musculoskeletal: Negative.    Skin: Negative.    Neurological:  Negative for dizziness, weakness, light-headedness and headaches.   Psychiatric/Behavioral: Negative.       Historical Information   Past Medical History:   Diagnosis Date    BPH (benign prostatic hyperplasia)     Colon polyp     Dementia (HCC)     Diverticulitis     Hypertension     Macular degeneration     Stroke (HCC)      Past Surgical History:   Procedure Laterality Date    COLONOSCOPY      CORONARY ANGIOPLASTY WITH STENT PLACEMENT      CYSTOSCOPY      IL TCAT IV STENT CRV CRTD ART EMBOLIC PROTECJ Right 05/22/2024    Procedure: ANGIOPLASTY ARTERY CAROTID W/ STENT;  Surgeon: Sven Barajas DO;  Location: [facility];  Service: Vascular    PROSTATE SURGERY      TONSILLECTOMY      VASECTOMY       Social History   Social History     Substance and Sexual Activity   Alcohol Use Not Currently     Social History      Substance and Sexual Activity   Drug Use No     E-Cigarette/Vaping    E-Cigarette Use Never User      E-Cigarette/Vaping Substances    Nicotine No     THC No     CBD No     Flavoring No      Social History     Tobacco Use   Smoking Status Every Day    Current packs/day: 0.50    Average packs/day: 0.5 packs/day for 67.5 years (33.7 ttl pk-yrs)    Types: Cigarettes    Start date: 1957   Smokeless Tobacco Never     Family History: History reviewed. No pertinent family history.    Meds/Allergies   all current active meds have been reviewed, current meds:   Current Facility-Administered Medications   Medication Dose Route Frequency    dextrose 5 % and sodium chloride 0.9 % infusion  125 mL/hr Intravenous Continuous    enoxaparin (LOVENOX) subcutaneous injection 40 mg  40 mg Subcutaneous Daily    nicotine (NICODERM CQ) 7 mg/24hr TD 24 hr patch 1 patch  1 patch Transdermal Daily    ondansetron (ZOFRAN) injection 4 mg  4 mg Intravenous Q6H PRN    phenol (CHLORASEPTIC) 1.4 % mucosal liquid 1 spray  1 spray Mouth/Throat Q2H PRN   , and PTA meds:   Prior to Admission Medications   Prescriptions Last Dose Informant Patient Reported? Taking?   Apoaequorin (PREVAGEN PO) Not Taking  Yes No   Sig: Take 1 Capful by mouth in the morning   Patient not taking: Reported on 5/17/2024   Multiple Vitamin (MULTIVITAMIN) capsule 6/28/2024  Yes Yes   Sig: Take 1 capsule by mouth daily   Multiple Vitamins-Minerals (OCUVITE PO) 6/28/2024  Yes Yes   Sig: Take 1 tablet by mouth daily   acetaminophen (TYLENOL) 325 mg tablet 6/27/2024  No Yes   Sig: Take 2 tablets (650 mg total) by mouth every 6 (six) hours as needed for mild pain   amLODIPine (NORVASC) 5 mg tablet 6/28/2024  Yes Yes   Sig: Take 5 mg by mouth daily   amLODIPine (NORVASC) 5 mg tablet 6/28/2024  No Yes   Sig: Take 1 tablet (5 mg total) by mouth daily   aspirin 81 mg chewable tablet 6/27/2024  No Yes   Sig: Chew 1 tablet (81 mg total) daily   atorvastatin (LIPITOR) 40 mg tablet  "6/27/2024  No Yes   Sig: Take 1 tablet (40 mg total) by mouth every evening   buPROPion (WELLBUTRIN XL) 150 mg 24 hr tablet Not Taking  Yes No   Patient not taking: Reported on 6/28/2024   clopidogrel (PLAVIX) 75 mg tablet 6/28/2024  No Yes   Sig: Take 1 tablet (75 mg total) by mouth daily   diphenoxylate-atropine (LOMOTIL) 2.5-0.025 mg per tablet 6/28/2024  No Yes   Sig: Take 1 tablet by mouth 4 (four) times a day as needed for diarrhea for up to 10 days   losartan (COZAAR) 100 MG tablet 6/28/2024  Yes Yes   Sig: Take 1 tablet (100 mg total) by mouth daily   memantine (NAMENDA) 10 mg tablet 6/28/2024  Yes Yes   Sig: Take 10 mg by mouth daily   promethazine-dextromethorphan (PHENERGAN-DM) 6.25-15 mg/5 mL oral syrup Not Taking  Yes No   Sig: Take 5 mL by mouth 4 (four) times a day as needed   Patient not taking: Reported on 6/4/2024   propranolol (INDERAL) 10 mg tablet 6/28/2024  Yes Yes   Sig: Take 10 mg by mouth 3 (three) times a day   sildenafil (VIAGRA) 25 MG tablet Past Month  Yes Yes   Sig: Take 25 mg by mouth daily as needed for erectile dysfunction   tamsulosin (FLOMAX) 0.4 mg 6/27/2024  Yes Yes   Sig: Take 0.4 mg by mouth daily with dinner      Facility-Administered Medications: None     No Known Allergies    Objective   First Vitals:   Blood Pressure: 143/83 (06/28/24 1525)  Pulse: 77 (06/28/24 1525)  Temperature: 98.3 °F (36.8 °C) (06/28/24 1525)  Temp Source: Temporal (06/28/24 1525)  Respirations: 18 (06/28/24 1525)  Height: 5' 10\" (177.8 cm) (06/28/24 1525)  Weight - Scale: 74.8 kg (165 lb) (06/28/24 1525)  SpO2: 96 % (06/28/24 1525)    Current Vitals:   Blood Pressure: 118/72 (06/29/24 0733)  Pulse: 89 (06/28/24 2310)  Temperature: 98.2 °F (36.8 °C) (06/29/24 0733)  Temp Source: Temporal (06/28/24 2310)  Respirations: 17 (06/29/24 0733)  Height: 5' 10\" (177.8 cm) (06/28/24 2154)  Weight - Scale: 75.7 kg (166 lb 12.8 oz) (06/28/24 2154)  SpO2: 95 % (06/28/24 2310)    Intake/Output Summary (Last 24 " hours) at 6/29/2024 1220  Last data filed at 6/29/2024 1001  Gross per 24 hour   Intake 1000 ml   Output 1825 ml   Net -825 ml     Invasive Devices       Peripheral Intravenous Line  Duration             Peripheral IV 06/28/24 Right Antecubital <1 day              Drain  Duration             NG/OG/Enteral Tube Nasogastric 16 Fr Right nare <1 day                  Physical Exam  Constitutional:       General: He is not in acute distress.     Appearance: Normal appearance. He is not ill-appearing.      Comments: Wife at bedside   HENT:      Head: Normocephalic and atraumatic.      Right Ear: External ear normal.      Left Ear: External ear normal.      Nose: Nose normal.      Mouth/Throat:      Pharynx: Oropharynx is clear.   Eyes:      Extraocular Movements: Extraocular movements intact.   Cardiovascular:      Rate and Rhythm: Normal rate.      Pulses: Normal pulses.   Pulmonary:      Effort: Pulmonary effort is normal. No respiratory distress.   Abdominal:      General: Abdomen is flat. Bowel sounds are decreased. There is distension (softly).      Palpations: Abdomen is soft.      Tenderness: There is no abdominal tenderness. There is no guarding or rebound.   Musculoskeletal:         General: No swelling or deformity. Normal range of motion.      Cervical back: Normal range of motion.   Skin:     General: Skin is warm and dry.   Neurological:      General: No focal deficit present.      Mental Status: He is alert and oriented to person, place, and time. Mental status is at baseline.   Psychiatric:         Mood and Affect: Mood normal.         Behavior: Behavior normal.     Lab Results: I have personally reviewed pertinent lab results.  , CBC:   Lab Results   Component Value Date    WBC 10.99 (H) 06/29/2024    HGB 13.8 06/29/2024    HCT 42.2 06/29/2024    MCV 96 06/29/2024     06/29/2024    RBC 4.39 06/29/2024    MCH 31.4 06/29/2024    MCHC 32.7 06/29/2024    RDW 13.5 06/29/2024    MPV 11.2 06/29/2024    NRBC  0 06/29/2024   , CMP:   Lab Results   Component Value Date    SODIUM 138 06/29/2024    K 3.6 06/29/2024     06/29/2024    CO2 23 06/29/2024    BUN 16 06/29/2024    CREATININE 1.04 06/29/2024    CALCIUM 8.9 06/29/2024    AST 19 06/28/2024    ALT 26 06/28/2024    ALKPHOS 96 06/28/2024    EGFR 67 06/29/2024     Imaging: I have personally reviewed pertinent reports.    EKG, Pathology, and Other Studies: I have personally reviewed pertinent reports.      Code Status: Level 1 - Full Code  Advance Directive and Living Will:      Power of :    POLST:      Counseling / Coordination of Care  Total floor / unit time spent today 40 minutes.  Greater than 50% of total time was spent with the patient and / or family counseling and / or coordination of care.  A description of the counseling / coordination of care: Review of chart, labs, prior history, imaging, discussion with patient to obtain history, perform physical, review assessment and plan.     Katrina Elizabeth Billig, PA-C  6/29/2024

## 2024-06-30 LAB
ANION GAP SERPL CALCULATED.3IONS-SCNC: 4 MMOL/L (ref 4–13)
BUN SERPL-MCNC: 16 MG/DL (ref 5–25)
CALCIUM SERPL-MCNC: 8 MG/DL (ref 8.4–10.2)
CHLORIDE SERPL-SCNC: 114 MMOL/L (ref 96–108)
CO2 SERPL-SCNC: 25 MMOL/L (ref 21–32)
CREAT SERPL-MCNC: 1.21 MG/DL (ref 0.6–1.3)
GFR SERPL CREATININE-BSD FRML MDRD: 55 ML/MIN/1.73SQ M
GLUCOSE SERPL-MCNC: 100 MG/DL (ref 65–140)
POTASSIUM SERPL-SCNC: 3.5 MMOL/L (ref 3.5–5.3)
SODIUM SERPL-SCNC: 143 MMOL/L (ref 135–147)

## 2024-06-30 PROCEDURE — 99232 SBSQ HOSP IP/OBS MODERATE 35: CPT | Performed by: FAMILY MEDICINE

## 2024-06-30 PROCEDURE — 99232 SBSQ HOSP IP/OBS MODERATE 35: CPT | Performed by: SURGERY

## 2024-06-30 PROCEDURE — 80048 BASIC METABOLIC PNL TOTAL CA: CPT | Performed by: FAMILY MEDICINE

## 2024-06-30 RX ADMIN — SODIUM CHLORIDE 50 ML/HR: 0.9 INJECTION, SOLUTION INTRAVENOUS at 22:53

## 2024-06-30 RX ADMIN — PIPERACILLIN AND TAZOBACTAM 4.5 G: 36; 4.5 INJECTION, POWDER, FOR SOLUTION INTRAVENOUS at 00:52

## 2024-06-30 RX ADMIN — SODIUM CHLORIDE 100 ML/HR: 0.9 INJECTION, SOLUTION INTRAVENOUS at 01:09

## 2024-06-30 RX ADMIN — NICOTINE 1 PATCH: 7 PATCH, EXTENDED RELEASE TRANSDERMAL at 08:23

## 2024-06-30 RX ADMIN — PIPERACILLIN AND TAZOBACTAM 4.5 G: 36; 4.5 INJECTION, POWDER, FOR SOLUTION INTRAVENOUS at 08:20

## 2024-06-30 RX ADMIN — Medication 1 SPRAY: at 08:20

## 2024-06-30 RX ADMIN — ENOXAPARIN SODIUM 40 MG: 40 INJECTION SUBCUTANEOUS at 08:20

## 2024-06-30 RX ADMIN — PIPERACILLIN AND TAZOBACTAM 4.5 G: 36; 4.5 INJECTION, POWDER, FOR SOLUTION INTRAVENOUS at 17:20

## 2024-06-30 NOTE — PROGRESS NOTES
[facility]  Progress Note  Name: Joe Aviles I  MRN: [id number]  Unit/Bed#: MS [id number] I Date of Admission: 6/28/2024   Date of Service: 6/30/2024 I Hospital Day: 2    Assessment & Plan   * Small bowel obstruction (HCC)  Assessment & Plan  Patient presents with abdominal pain, nausea and constipation. He was recently seen in the ER for diarrhea, and was discharged imodium.  Patient has known history of diverticulitis/diverticulosis.  No previous abdominal surgery reported except for prostate related surgery which was done via cystoscopy.  No prior history of bowel obstruction.  He hasn't had any BM for 2 days, has mild nausea,   CT scan abd shows Findings consistent with high-grade small bowel obstruction with transition point involving an approximately 10 cm segment of small bowel in the lower central abdomen.   NGT placed.  some concern that it was a little dislodged this morning and hence asked surgery to evaluate and do imaging if felt necessary  Keep npo  Surgery consulted  IVF for now  Also started antibiotics as per surgery in case it is secondary to diverticulitis    PVD (peripheral vascular disease) (HCC)  Assessment & Plan  Currently stable    BPH (benign prostatic hyperplasia)  Assessment & Plan  Monitor for now    Essential hypertension  Assessment & Plan  Blood Pressure was elevated yesterday secondary to abdominal pain.  Now improving.  Oral antihypertensive agents are currently on hold will place on as needed hydralazine in case his high blood pressures over 180               VTE Pharmacologic Prophylaxis: VTE Score: 4 Moderate Risk (Score 3-4) - Pharmacological DVT Prophylaxis Ordered: enoxaparin (Lovenox).    Mobility:   Basic Mobility Inpatient Raw Score: 22  JH-HLM Goal: 7: Walk 25 feet or more  JH-HLM Achieved: 6: Walk 10 steps or more  JH-HLM Goal achieved. Continue to encourage appropriate mobility.    Patient Centered Rounds: I performed bedside rounds with  nursing staff today.   Discussions with Specialists or Other Care Team Provider:  surgery    Education and Discussions with Family / Patient:     Total Time Spent on Date of Encounter in care of patient: 35 mins. This time was spent on one or more of the following: performing physical exam; counseling and coordination of care; obtaining or reviewing history; documenting in the medical record; reviewing/ordering tests, medications or procedures; communicating with other healthcare professionals and discussing with patient's family/caregivers.    Current Length of Stay: 2 day(s)  Current Patient Status: Inpatient   Certification Statement: The patient will continue to require additional inpatient hospital stay due to sbo  Discharge Plan: Anticipate discharge in 48-72 hrs to home with home services.    Code Status: Level 1 - Full Code    Subjective:   Patient has had several bowel movements overnight.  Denies any abdominal pain had some difficulty as his NG tube felt to be a little dislodged today and it was repositioned.    Objective:     Vitals:   Temp (24hrs), Av.5 °F (36.9 °C), Min:98.4 °F (36.9 °C), Max:98.6 °F (37 °C)    Temp:  [98.4 °F (36.9 °C)-98.6 °F (37 °C)] 98.4 °F (36.9 °C)  HR:  [77-98] 77  Resp:  [17-18] 17  BP: (118-139)/(69-77) 139/77  SpO2:  [90 %-95 %] 95 %  Body mass index is 23.93 kg/m².     Input and Output Summary (last 24 hours):     Intake/Output Summary (Last 24 hours) at 2024 1050  Last data filed at 2024 0601  Gross per 24 hour   Intake 1240 ml   Output 900 ml   Net 340 ml       Physical Exam:   Physical Exam  Vitals and nursing note reviewed.   Constitutional:       Appearance: Normal appearance.   HENT:      Head: Normocephalic and atraumatic.      Right Ear: External ear normal.      Left Ear: External ear normal.      Nose: Nose normal.      Comments: nG-tube     Mouth/Throat:      Pharynx: Oropharynx is clear.   Eyes:      Pupils: Pupils are equal, round, and reactive to  light.   Cardiovascular:      Rate and Rhythm: Normal rate and regular rhythm.      Heart sounds: Normal heart sounds.   Pulmonary:      Effort: Pulmonary effort is normal.      Breath sounds: Normal breath sounds.   Abdominal:      General: Bowel sounds are normal. There is distension.      Palpations: Abdomen is soft.      Tenderness: There is no abdominal tenderness.   Musculoskeletal:         General: Normal range of motion.      Cervical back: Normal range of motion and neck supple.   Skin:     General: Skin is warm and dry.      Capillary Refill: Capillary refill takes less than 2 seconds.   Neurological:      General: No focal deficit present.      Mental Status: He is alert and oriented to person, place, and time.   Psychiatric:         Mood and Affect: Mood normal.            Additional Data:     Labs:  Results from last 7 days   Lab Units 06/29/24  0447   WBC Thousand/uL 10.99*   HEMOGLOBIN g/dL 13.8   HEMATOCRIT % 42.2   PLATELETS Thousands/uL 158   SEGS PCT % 75   LYMPHO PCT % 12*   MONO PCT % 11   EOS PCT % 1     Results from last 7 days   Lab Units 06/30/24  0456 06/29/24  0447 06/28/24  1538   SODIUM mmol/L 143   < > 142   POTASSIUM mmol/L 3.5   < > 4.0   CHLORIDE mmol/L 114*   < > 108   CO2 mmol/L 25   < > 27   BUN mg/dL 16   < > 18   CREATININE mg/dL 1.21   < > 1.08   ANION GAP mmol/L 4   < > 7   CALCIUM mg/dL 8.0*   < > 9.5   ALBUMIN g/dL  --   --  4.3   TOTAL BILIRUBIN mg/dL  --   --  0.86   ALK PHOS U/L  --   --  96   ALT U/L  --   --  26   AST U/L  --   --  19   GLUCOSE RANDOM mg/dL 100   < > 132    < > = values in this interval not displayed.                       Lines/Drains:  Invasive Devices       Peripheral Intravenous Line  Duration             Peripheral IV 06/28/24 Right Antecubital 1 day              Drain  Duration             NG/OG/Enteral Tube Nasogastric 16 Fr Right nare 1 day                          Imaging: Reviewed radiology reports from this admission including:  abdominal/pelvic CT    Recent Cultures (last 7 days):         Last 24 Hours Medication List:   Current Facility-Administered Medications   Medication Dose Route Frequency Provider Last Rate    enoxaparin  40 mg Subcutaneous Daily Maurisio Garcia MD      hydrALAZINE  5 mg Intravenous Q6H PRN Chrissy Evans MD      nicotine  1 patch Transdermal Daily Maurisio Garcia MD      ondansetron  4 mg Intravenous Q6H PRN Maurisio Garcia MD      phenol  1 spray Mouth/Throat Q2H PRN Chrissy Evans MD      piperacillin-tazobactam  4.5 g Intravenous Q8H Katrina Elizabeth Billig, PA-C 4.5 g (06/30/24 0820)    sodium chloride  50 mL/hr Intravenous Continuous Chrissy Evans  mL/hr (06/30/24 0109)        Today, Patient Was Seen By: Chrissy Evans MD    **Please Note: This note may have been constructed using a voice recognition system.**

## 2024-06-30 NOTE — ASSESSMENT & PLAN NOTE
Patient presents with abdominal pain, nausea and constipation. He was recently seen in the ER for diarrhea, and was discharged imodium.  Patient has known history of diverticulitis/diverticulosis.  No previous abdominal surgery reported except for prostate related surgery which was done via cystoscopy.  No prior history of bowel obstruction.  He hasn't had any BM for 2 days, has mild nausea,   CT scan abd shows Findings consistent with high-grade small bowel obstruction with transition point involving an approximately 10 cm segment of small bowel in the lower central abdomen.   NGT placed.  some concern that it was a little dislodged this morning and hence asked surgery to evaluate and do imaging if felt necessary  Keep npo  Surgery consulted  IVF for now  Also started antibiotics as per surgery in case it is secondary to diverticulitis

## 2024-06-30 NOTE — PLAN OF CARE
Problem: Prexisting or High Potential for Compromised Skin Integrity  Goal: Skin integrity is maintained or improved  Description: INTERVENTIONS:  - Identify patients at risk for skin breakdown  - Assess and monitor skin integrity  - Assess and monitor nutrition and hydration status  - Monitor labs   - Assess for incontinence   - Turn and reposition patient  - Assist with mobility/ambulation  - Relieve pressure over bony prominences  - Avoid friction and shearing  - Provide appropriate hygiene as needed including keeping skin clean and dry  - Evaluate need for skin moisturizer/barrier cream  - Collaborate with interdisciplinary team   - Patient/family teaching  - Consider wound care consult   Outcome: Progressing     Problem: PAIN - ADULT  Goal: Verbalizes/displays adequate comfort level or baseline comfort level  Description: Interventions:  - Encourage patient to monitor pain and request assistance  - Assess pain using appropriate pain scale  - Administer analgesics based on type and severity of pain and evaluate response  - Implement non-pharmacological measures as appropriate and evaluate response  - Consider cultural and social influences on pain and pain management  - Notify physician/advanced practitioner if interventions unsuccessful or patient reports new pain  Outcome: Progressing     Problem: INFECTION - ADULT  Goal: Absence or prevention of progression during hospitalization  Description: INTERVENTIONS:  - Assess and monitor for signs and symptoms of infection  - Monitor lab/diagnostic results  - Monitor all insertion sites, i.e. indwelling lines, tubes, and drains  - Monitor endotracheal if appropriate and nasal secretions for changes in amount and color  - [place] appropriate cooling/warming therapies per order  - Administer medications as ordered  - Instruct and encourage patient and family to use good hand hygiene technique  - Identify and instruct in appropriate isolation precautions for  identified infection/condition  Outcome: Progressing  Goal: Absence of fever/infection during neutropenic period  Description: INTERVENTIONS:  - Monitor WBC    Outcome: Progressing     Problem: SAFETY ADULT  Goal: Patient will remain free of falls  Description: INTERVENTIONS:  - Educate patient/family on patient safety including physical limitations  - Instruct patient to call for assistance with activity   - Consult OT/PT to assist with strengthening/mobility   - Keep Call bell within reach  - Keep bed low and locked with side rails adjusted as appropriate  - Keep care items and personal belongings within reach  - Initiate and maintain comfort rounds  - Make Fall Risk Sign visible to staff  - Offer Toileting every 2 Hours, in advance of need  - Initiate/Maintain bed/chair alarm  - Obtain necessary fall risk management equipment:   - Apply yellow socks and bracelet for high fall risk patients  - Consider moving patient to room near nurses station  Outcome: Progressing  Goal: Maintain or return to baseline ADL function  Description: INTERVENTIONS:  -  Assess patient's ability to carry out ADLs; assess patient's baseline for ADL function and identify physical deficits which impact ability to perform ADLs (bathing, care of mouth/teeth, toileting, grooming, dressing, etc.)  - Assess/evaluate cause of self-care deficits   - Assess range of motion  - Assess patient's mobility; develop plan if impaired  - Assess patient's need for assistive devices and provide as appropriate  - Encourage maximum independence but intervene and supervise when necessary  - Involve family in performance of ADLs  - Assess for home care needs following discharge   - Consider OT consult to assist with ADL evaluation and planning for discharge  - Provide patient education as appropriate  Outcome: Progressing  Goal: Maintains/Returns to pre admission functional level  Description: INTERVENTIONS:  - Perform AM-PAC 6 Click Basic Mobility/ Daily  Activity assessment daily.  - Set and communicate daily mobility goal to care team and patient/family/caregiver.   - Collaborate with rehabilitation services on mobility goals if consulted  - Perform Range of Motion 3 times a day.  - Reposition patient every 2 hours.  - Dangle patient 3 times a day  - Stand patient 3 times a day  - Ambulate patient 3 times a day  - Out of bed to chair 3 times a day   - Out of bed for meals 3 times a day  - Out of bed for toileting  - Record patient progress and toleration of activity level   Outcome: Progressing     Problem: DISCHARGE PLANNING  Goal: Discharge to home or other facility with appropriate resources  Description: INTERVENTIONS:  - Identify barriers to discharge w/patient and caregiver  - Arrange for needed discharge resources and transportation as appropriate  - Identify discharge learning needs (meds, wound care, etc.)  - Arrange for interpretive services to assist at discharge as needed  - Refer to Case Management Department for coordinating discharge planning if the patient needs post-hospital services based on physician/advanced practitioner order or complex needs related to functional status, cognitive ability, or social support system  Outcome: Progressing     Problem: Knowledge Deficit  Goal: Patient/family/caregiver demonstrates understanding of disease process, treatment plan, medications, and discharge instructions  Description: Complete learning assessment and assess knowledge base.  Interventions:  - Provide teaching at level of understanding  - Provide teaching via preferred learning methods  Outcome: Progressing

## 2024-06-30 NOTE — PROGRESS NOTES
"Progress Note - General Surgery   Joe Aviles 83 y.o. male MRN: [id number]  Unit/Bed#: -01 Encounter: [id number]    ssessment:  83 y.o. male HD2 admitted with small bowel obstruction     CTAP: high-grade small bowel obstruction with transition point involving an approximately 10 cm segment of small bowel in the lower central abdomen.    - Afebrile, VSS on room air, episodes of HTN   - BMP WNL     - 900ml (1.3L) output from NGT in past 24hrs  - had small BM this am, several overnight, passing flatus  - reports decreased abd distension this am, no abd pain or nausea    - PMHx PVD, BPH, HTN, recently had carotid stents for symptomatic right ICA stenosis   - No prior abdominal surgical hx, no hx of prior sbo or inflammatory bowel disease    Plan:  - Clamp trial today x 4 hrs. Pt aware to let his nurse know if nausea or abdominal pain develops.  -Continue NPO  - IVF hydration  - IV abx in setting of possible inflammation at site of transition point - Zosyn   - Pain/nausea control PRN  - Encourage ambulation   - Co-morbid conditions per primary       Subjective: He is feeling better today. He had a small firm BM this am and several overnight. Reports less abdominal bloating and complete resolution of pain. Denies F/C.    Objective:     General: VSS, NAD, alert, pleasant  Head normocephalic, atraumatic  Neck: supple, NT, no masses or JVD   Lungs nl respiratory effort  Heart RRR   Abd softly distended, +BS, NT, no masses or guarding  Extremities: FAROM with good strength equal bilaterally, no edema  Neuro: A&Ox3, affect appropriate, distal sensation and muscular strength intact      Blood pressure 139/77, pulse 77, temperature 98.4 °F (36.9 °C), resp. rate 17, height 5' 10\" (1.778 m), weight 75.7 kg (166 lb 12.8 oz), SpO2 95%.,Body mass index is 23.93 kg/m².      Intake/Output Summary (Last 24 hours) at 6/30/2024 1106  Last data filed at 6/30/2024 0601  Gross per 24 hour   Intake 1240 ml   Output 900 ml   Net 340 " "ml       Invasive Devices       Peripheral Intravenous Line  Duration             Peripheral IV 06/28/24 Right Antecubital 1 day              Drain  Duration             NG/OG/Enteral Tube Nasogastric 16 Fr Right nare 1 day                    Lab, Imaging and other studies:CBC: No results found for: \"WBC\", \"HGB\", \"HCT\", \"MCV\", \"PLT\", \"ADJUSTEDWBC\", \"RBC\", \"MCH\", \"MCHC\", \"RDW\", \"MPV\", \"NRBC\", CMP:   Lab Results   Component Value Date    SODIUM 143 06/30/2024    K 3.5 06/30/2024     (H) 06/30/2024    CO2 25 06/30/2024    BUN 16 06/30/2024    CREATININE 1.21 06/30/2024    CALCIUM 8.0 (L) 06/30/2024    EGFR 55 06/30/2024   , Coagulation: No results found for: \"PT\", \"INR\", \"APTT\", Urinalysis: No results found for: \"COLORU\", \"CLARITYU\", \"SPECGRAV\", \"PHUR\", \"LEUKOCYTESUR\", \"NITRITE\", \"PROTEINUA\", \"GLUCOSEU\", \"KETONESU\", \"BILIRUBINUR\", \"BLOODU\", Amylase: No results found for: \"AMYLASE\", Lipase: No results found for: \"LIPASE\"  VTE Pharmacologic Prophylaxis: Enoxaparin (Lovenox)  VTE Mechanical Prophylaxis: sequential compression device      "

## 2024-07-01 ENCOUNTER — APPOINTMENT (INPATIENT)
Dept: RADIOLOGY | Facility: HOSPITAL | Age: 84
DRG: 389 | End: 2024-07-01
Payer: COMMERCIAL

## 2024-07-01 VITALS
BODY MASS INDEX: 23.88 KG/M2 | WEIGHT: 166.8 LBS | HEART RATE: 79 BPM | HEIGHT: 70 IN | OXYGEN SATURATION: 95 % | DIASTOLIC BLOOD PRESSURE: 83 MMHG | TEMPERATURE: 98.1 F | SYSTOLIC BLOOD PRESSURE: 150 MMHG | RESPIRATION RATE: 19 BRPM

## 2024-07-01 LAB
ATRIAL RATE: 65 BPM
P AXIS: 51 DEGREES
PR INTERVAL: 150 MS
QRS AXIS: -37 DEGREES
QRSD INTERVAL: 102 MS
QT INTERVAL: 398 MS
QTC INTERVAL: 413 MS
T WAVE AXIS: 30 DEGREES
VENTRICULAR RATE: 65 BPM

## 2024-07-01 PROCEDURE — 99239 HOSP IP/OBS DSCHRG MGMT >30: CPT | Performed by: FAMILY MEDICINE

## 2024-07-01 PROCEDURE — 93010 ELECTROCARDIOGRAM REPORT: CPT | Performed by: INTERNAL MEDICINE

## 2024-07-01 PROCEDURE — 74022 RADEX COMPL AQT ABD SERIES: CPT

## 2024-07-01 RX ORDER — METRONIDAZOLE 500 MG/1
500 TABLET ORAL EVERY 8 HOURS SCHEDULED
Status: DISCONTINUED | OUTPATIENT
Start: 2024-07-01 | End: 2024-07-01 | Stop reason: HOSPADM

## 2024-07-01 RX ORDER — CIPROFLOXACIN 500 MG/1
500 TABLET, FILM COATED ORAL EVERY 12 HOURS SCHEDULED
Status: DISCONTINUED | OUTPATIENT
Start: 2024-07-01 | End: 2024-07-01 | Stop reason: HOSPADM

## 2024-07-01 RX ORDER — CEPHALEXIN 500 MG/1
500 CAPSULE ORAL EVERY 8 HOURS SCHEDULED
Qty: 15 CAPSULE | Refills: 0 | Status: SHIPPED | OUTPATIENT
Start: 2024-07-01 | End: 2024-07-06

## 2024-07-01 RX ORDER — METRONIDAZOLE 500 MG/1
500 TABLET ORAL EVERY 8 HOURS SCHEDULED
Qty: 15 TABLET | Refills: 0 | Status: SHIPPED | OUTPATIENT
Start: 2024-07-01 | End: 2024-07-06

## 2024-07-01 RX ADMIN — METRONIDAZOLE 500 MG: 500 TABLET ORAL at 09:21

## 2024-07-01 RX ADMIN — CIPROFLOXACIN 500 MG: 500 TABLET, FILM COATED ORAL at 09:20

## 2024-07-01 RX ADMIN — ENOXAPARIN SODIUM 40 MG: 40 INJECTION SUBCUTANEOUS at 09:20

## 2024-07-01 RX ADMIN — PIPERACILLIN AND TAZOBACTAM 4.5 G: 36; 4.5 INJECTION, POWDER, FOR SOLUTION INTRAVENOUS at 00:33

## 2024-07-01 RX ADMIN — NICOTINE 1 PATCH: 7 PATCH, EXTENDED RELEASE TRANSDERMAL at 09:24

## 2024-07-01 RX ADMIN — METRONIDAZOLE 500 MG: 500 TABLET ORAL at 14:19

## 2024-07-01 NOTE — PROGRESS NOTES
"Progress Note - General Surgery   Joe Aviles 83 y.o. male MRN: [id number]  Unit/Bed#: -01 Encounter: [id number]    Assessment:  HD 3 -> 4 with ileus vs mechanical SBO  Reports loose stool, reports this is his baseline state, and that he had been taking imodium in escalating doses as an outpatient to control his symptoms.   Tolerating clear liquids with appetite for solid food.  VSS AF  Abdominal exam completely benign today.    Plan:  Advance diet  Taper and D/C IV fluid  IV antibiotics -> PO Cipro/Flagyl to cover for diverticulitis  Suggest GI consult to evaluate symptoms of chronic diarrhea    Subjective/Objective   Chief Complaint: \"My stools were pretty loose this morning\"    Subjective: Cheerful, but disappointed no solid food on the breakfast tray.     Objective:     Blood pressure 149/78, pulse 82, temperature 98.1 °F (36.7 °C), resp. rate 18, height 5' 10\" (1.778 m), weight 75.7 kg (166 lb 12.8 oz), SpO2 93%.,Body mass index is 23.93 kg/m².      Intake/Output Summary (Last 24 hours) at 7/1/2024 0824  Last data filed at 7/1/2024 0101  Gross per 24 hour   Intake 600 ml   Output --   Net 600 ml       Invasive Devices       Peripheral Intravenous Line  Duration             Peripheral IV 06/28/24 Right Antecubital 2 days                    Physical Exam:   Ambulating with ease  Abd:  soft, non-tender, with active bowel sounds.    Lab, Imaging and other studies:  No new labs  VTE Pharmacologic Prophylaxis: Enoxaparin (Lovenox)  VTE Mechanical Prophylaxis: sequential compression device  "

## 2024-07-01 NOTE — DISCHARGE SUMMARY
[facility]  Discharge- Joe Cowartog 1940, 83 y.o. male MRN: [id number]  Unit/Bed#: MS 315[facility]nen Encounter: [id number]  Primary Care Provider: Jayme Wolff MD   Date and time admitted to hospital: 6/28/2024  3:23 PM    * Small bowel obstruction (HCC)  Assessment & Plan  Patient presents with abdominal pain, nausea and constipation. He was recently seen in the ER for diarrhea, and was discharged imodium.  Patient has known history of diverticulitis/diverticulosis.  No previous abdominal surgery reported except for prostate related surgery which was done via cystoscopy.  No prior history of bowel obstruction.  He hasn't had any BM for 2 days, has mild nausea,   CT scan abd shows Findings consistent with high-grade small bowel obstruction with transition point involving an approximately 10 cm segment of small bowel in the lower central abdomen.   NGT placed initially and removed yesterday.  So far patient has had several bowel movements tolerating oral diet  Also started antibiotics as per surgery in case it is secondary to diverticulitis  If he remains stable will discharge him home later today and I will do an obstruction series also    PVD (peripheral vascular disease) (HCC)  Assessment & Plan  Currently stable    BPH (benign prostatic hyperplasia)  Assessment & Plan  Monitor for now    Essential hypertension  Assessment & Plan  Blood Pressure was elevated yesterday secondary to abdominal pain.  Now improving.  Oral antihypertensive agents are currently on hold will place on as needed hydralazine in case his high blood pressures over 180      Discharging Physician / Practitioner: Chrissy Evans MD  PCP: Jayme Wolff MD  Admission Date:   Admission Orders (From admission, onward)       Ordered        06/28/24 2036  INPATIENT ADMISSION  Once                          Discharge Date: 07/01/24    Medical Problems       Resolved Problems  Date Reviewed: 7/1/2024   None          Consultations During Hospital Stay:  surgery    Procedures Performed:   none    Significant Findings / Test Results:   CT abdomen pelvis with contrast    Result Date: 6/28/2024  Impression: Findings consistent with high-grade small bowel obstruction with transition point involving an approximately 10 cm segment of small bowel in the lower central abdomen. Findings discussed with Dr. Briceno at 8:37 p.m., 6/28/2024 Workstation performed: [id number]     CT renal stone study abdomen pelvis wo contrast    Result Date: 6/28/2024  Impression: 1.  Bowel dilatation from the esophagus through the mid small bowel with collapsed loops distally and decompressed colon. The appearance is suspicious for a small bowel obstruction, particularly given small bowel feces sign though etiology for obstruction is not apparent on this CT as a discrete transition point is not seen. There does appear to be mild thickening of the fecalized small bowel. 2.  Mild mesenteric and pelvic fluid, likely related to obstruction. 3.  Indeterminate hepatic lesion, not appearing cystic. Follow-up nonemergent contrast-enhanced MRI is recommended. 4.  Indeterminate hyperdense nodule adjacent to a small left fat-containing inguinal hernia. This is of uncertain etiology and clinical significance though can be reevaluated for size stability and additional characterization on a follow-up ultrasound in  3 months. 5.  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 According to ACR white paper for the management of incidentally detected abdominal vascular findings, for an aorta of this caliber (2.5-2.9 cm) follow-up imaging (e.g. Doppler ultrasound, CTA abdomen/pelvis) is recommended at a 5 year interval. Reference:  J Am Jose Cruz Radiol 2013;10:789-794. The study was marked in EPIC for immediate notification. Workstation performed: [id number]      Incidental Findings:   none    Test Results Pending at Discharge (will require  "follow up):   none     Outpatient Tests Requested:  Outpt follow up with gi    Complications:  none    Reason for Admission: Small bowel obstruction    Hospital Course:     Joe Aviles is a 83 y.o. male patient who originally presented to the hospital on 6/28/2024 due to small bowel obstruction.  Patient was treated conservatively with n.p.o. NG tube IV fluid hydration and he is trying to improve NG tube was removed he is tolerating a soft diet will be discharged home later today with outpatient follow-up with GI recommended will do a total of 7-day course of oral antibiotics as he has previous known history of diverticulitis which may have caused this episode      Please see above list of diagnoses and related plan for additional information.     Condition at Discharge: fair     Discharge Day Visit / Exam:     Subjective: Patient denies any chest pain or shortness of breath or abdominal pain.  Had several bowel movements and tolerating his diet  Vitals: Blood Pressure: 149/78 (07/01/24 0723)  Pulse: 82 (07/01/24 0723)  Temperature: 100 °F (37.8 °C) (07/01/24 0904)  Temp Source: Temporal (06/28/24 2310)  Respirations: 18 (07/01/24 0723)  Height: 5' 10\" (177.8 cm) (06/28/24 2154)  Weight - Scale: 75.7 kg (166 lb 12.8 oz) (06/28/24 2154)  SpO2: 94 % (07/01/24 0920)  Exam:   Physical Exam  Vitals and nursing note reviewed.   Constitutional:       Appearance: Normal appearance.   HENT:      Head: Normocephalic and atraumatic.      Right Ear: External ear normal.      Left Ear: External ear normal.      Nose: Nose normal.      Mouth/Throat:      Pharynx: Oropharynx is clear.   Cardiovascular:      Rate and Rhythm: Normal rate and regular rhythm.      Heart sounds: Normal heart sounds.   Pulmonary:      Effort: Pulmonary effort is normal.      Breath sounds: Normal breath sounds.   Abdominal:      General: Bowel sounds are normal.      Palpations: Abdomen is soft.      Tenderness: There is no abdominal tenderness. "   Musculoskeletal:         General: Normal range of motion.      Cervical back: Normal range of motion and neck supple.   Skin:     General: Skin is warm and dry.      Capillary Refill: Capillary refill takes less than 2 seconds.   Neurological:      General: No focal deficit present.      Mental Status: He is alert and oriented to person, place, and time.   Psychiatric:         Mood and Affect: Mood normal.         Discussion with Family: update wife    Discharge instructions/Information to patient and family:   See after visit summary for information provided to patient and family.      Provisions for Follow-Up Care:  See after visit summary for information related to follow-up care and any pertinent home health orders.      Disposition:     Home    For Discharges to [facility]:   Not Applicable to this Patient - Not Applicable to this Patient    Planned Readmission: none     Discharge Statement:  I spent 35 minutes discharging the patient. This time was spent on the day of discharge. I had direct contact with the patient on the day of discharge. Greater than 50% of the total time was spent examining patient, answering all patient questions, arranging and discussing plan of care with patient as well as directly providing post-discharge instructions.  Additional time then spent on discharge activities.    Discharge Medications:  See after visit summary for reconciled discharge medications provided to patient and family.      ** Please Note: This note has been constructed using a voice recognition system **

## 2024-07-01 NOTE — ASSESSMENT & PLAN NOTE
Patient presents with abdominal pain, nausea and constipation. He was recently seen in the ER for diarrhea, and was discharged imodium.  Patient has known history of diverticulitis/diverticulosis.  No previous abdominal surgery reported except for prostate related surgery which was done via cystoscopy.  No prior history of bowel obstruction.  He hasn't had any BM for 2 days, has mild nausea,   CT scan abd shows Findings consistent with high-grade small bowel obstruction with transition point involving an approximately 10 cm segment of small bowel in the lower central abdomen.   NGT placed initially and removed yesterday.  So far patient has had several bowel movements tolerating oral diet  Also started antibiotics as per surgery in case it is secondary to diverticulitis  If he remains stable will discharge him home later today and I will do an obstruction series also

## 2024-07-01 NOTE — CASE MANAGEMENT
Case Management Discharge Planning Note    Patient name Joe Aviles  Location /-01 MRN [id number]  : 1940 Date 2024       Current Admission Date: 2024  Current Admission Diagnosis:Small bowel obstruction (HCC)   Patient Active Problem List    Diagnosis Date Noted Date Diagnosed    Small bowel obstruction (HCC) 2024     Shortness of breath 2024     Smoking 2024     Preop cardiovascular exam 2024     PVD (peripheral vascular disease) (HCC) 2024     Cigarette nicotine dependence with nicotine-induced disorder 2024     Bilateral carotid artery stenosis 2024     Essential hypertension 2024     BPH (benign prostatic hyperplasia) 2024     Dementia (HCC) 2024     Stroke-like symptoms 2024     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  [address] [facility] [address]  Phone: [phone] Fax: [phone]    Primary Care Provider: Jayme Wolff MD    Primary Insurance: [employer]  Secondary Insurance: [employer]    DISCHARGE DETAILS:     Discussed in rounds, potential dc later this afternoon, if tolerating his meal.  CM spoke to patient,he is I/PTA.  Wd discussed dc and not needs were identified.    CM spoke to patient at the bedside, reviewed DC IMM with patient and informed that patient can stay an additional 4  hours for reconsidering appealing the discharge as the medicare rights were review on the day of discharge. Pt verbalized understanding and if the MD discharges him, he is going home.i          DC plan is home.        IMM Given (Date):: 07/01/24  IMM Given to:: Patient

## 2024-07-01 NOTE — PLAN OF CARE
Problem: Prexisting or High Potential for Compromised Skin Integrity  Goal: Skin integrity is maintained or improved  Description: INTERVENTIONS:  - Identify patients at risk for skin breakdown  - Assess and monitor skin integrity  - Assess and monitor nutrition and hydration status  - Monitor labs   - Assess for incontinence   - Turn and reposition patient  - Assist with mobility/ambulation  - Relieve pressure over bony prominences  - Avoid friction and shearing  - Provide appropriate hygiene as needed including keeping skin clean and dry  - Evaluate need for skin moisturizer/barrier cream  - Collaborate with interdisciplinary team   - Patient/family teaching  - Consider wound care consult   Outcome: Progressing     Problem: PAIN - ADULT  Goal: Verbalizes/displays adequate comfort level or baseline comfort level  Description: Interventions:  - Encourage patient to monitor pain and request assistance  - Assess pain using appropriate pain scale  - Administer analgesics based on type and severity of pain and evaluate response  - Implement non-pharmacological measures as appropriate and evaluate response  - Consider cultural and social influences on pain and pain management  - Notify physician/advanced practitioner if interventions unsuccessful or patient reports new pain  Outcome: Progressing     Problem: INFECTION - ADULT  Goal: Absence or prevention of progression during hospitalization  Description: INTERVENTIONS:  - Assess and monitor for signs and symptoms of infection  - Monitor lab/diagnostic results  - Monitor all insertion sites, i.e. indwelling lines, tubes, and drains  - Monitor endotracheal if appropriate and nasal secretions for changes in amount and color  - Mobile appropriate cooling/warming therapies per order  - Administer medications as ordered  - Instruct and encourage patient and family to use good hand hygiene technique  - Identify and instruct in appropriate isolation precautions for  identified infection/condition  Outcome: Progressing  Goal: Absence of fever/infection during neutropenic period  Description: INTERVENTIONS:  - Monitor WBC    Outcome: Progressing     Problem: SAFETY ADULT  Goal: Patient will remain free of falls  Description: INTERVENTIONS:  - Educate patient/family on patient safety including physical limitations  - Instruct patient to call for assistance with activity   - Consult OT/PT to assist with strengthening/mobility   - Keep Call bell within reach  - Keep bed low and locked with side rails adjusted as appropriate  - Keep care items and personal belongings within reach  - Initiate and maintain comfort rounds  - Make Fall Risk Sign visible to staff  - Offer Toileting every 2 Hours, in advance of need  - Initiate/Maintain bed/chair alarm  - Obtain necessary fall risk management equipment:   - Apply yellow socks and bracelet for high fall risk patients  - Consider moving patient to room near nurses station  Outcome: Progressing  Goal: Maintain or return to baseline ADL function  Description: INTERVENTIONS:  -  Assess patient's ability to carry out ADLs; assess patient's baseline for ADL function and identify physical deficits which impact ability to perform ADLs (bathing, care of mouth/teeth, toileting, grooming, dressing, etc.)  - Assess/evaluate cause of self-care deficits   - Assess range of motion  - Assess patient's mobility; develop plan if impaired  - Assess patient's need for assistive devices and provide as appropriate  - Encourage maximum independence but intervene and supervise when necessary  - Involve family in performance of ADLs  - Assess for home care needs following discharge   - Consider OT consult to assist with ADL evaluation and planning for discharge  - Provide patient education as appropriate  Outcome: Progressing  Goal: Maintains/Returns to pre admission functional level  Description: INTERVENTIONS:  - Perform AM-PAC 6 Click Basic Mobility/ Daily  Activity assessment daily.  - Set and communicate daily mobility goal to care team and patient/family/caregiver.   - Collaborate with rehabilitation services on mobility goals if consulted  - Perform Range of Motion 3 times a day.  - Reposition patient every 2 hours.  - Dangle patient 3 times a day  - Stand patient 3 times a day  - Ambulate patient 3 times a day  - Out of bed to chair 3 times a day   - Out of bed for meals 3 times a day  - Out of bed for toileting  - Record patient progress and toleration of activity level   Outcome: Progressing     Problem: DISCHARGE PLANNING  Goal: Discharge to home or other facility with appropriate resources  Description: INTERVENTIONS:  - Identify barriers to discharge w/patient and caregiver  - Arrange for needed discharge resources and transportation as appropriate  - Identify discharge learning needs (meds, wound care, etc.)  - Arrange for interpretive services to assist at discharge as needed  - Refer to Case Management Department for coordinating discharge planning if the patient needs post-hospital services based on physician/advanced practitioner order or complex needs related to functional status, cognitive ability, or social support system  Outcome: Progressing     Problem: Knowledge Deficit  Goal: Patient/family/caregiver demonstrates understanding of disease process, treatment plan, medications, and discharge instructions  Description: Complete learning assessment and assess knowledge base.  Interventions:  - Provide teaching at level of understanding  - Provide teaching via preferred learning methods  Outcome: Progressing

## 2024-07-01 NOTE — PLAN OF CARE
Problem: Prexisting or High Potential for Compromised Skin Integrity  Goal: Skin integrity is maintained or improved  Description: INTERVENTIONS:  - Identify patients at risk for skin breakdown  - Assess and monitor skin integrity  - Assess and monitor nutrition and hydration status  - Monitor labs   - Assess for incontinence   - Turn and reposition patient  - Assist with mobility/ambulation  - Relieve pressure over bony prominences  - Avoid friction and shearing  - Provide appropriate hygiene as needed including keeping skin clean and dry  - Evaluate need for skin moisturizer/barrier cream  - Collaborate with interdisciplinary team   - Patient/family teaching  - Consider wound care consult   Outcome: Progressing     Problem: PAIN - ADULT  Goal: Verbalizes/displays adequate comfort level or baseline comfort level  Description: Interventions:  - Encourage patient to monitor pain and request assistance  - Assess pain using appropriate pain scale  - Administer analgesics based on type and severity of pain and evaluate response  - Implement non-pharmacological measures as appropriate and evaluate response  - Consider cultural and social influences on pain and pain management  - Notify physician/advanced practitioner if interventions unsuccessful or patient reports new pain  Outcome: Progressing     Problem: INFECTION - ADULT  Goal: Absence or prevention of progression during hospitalization  Description: INTERVENTIONS:  - Assess and monitor for signs and symptoms of infection  - Monitor lab/diagnostic results  - Monitor all insertion sites, i.e. indwelling lines, tubes, and drains  - Monitor endotracheal if appropriate and nasal secretions for changes in amount and color  - [place] appropriate cooling/warming therapies per order  - Administer medications as ordered  - Instruct and encourage patient and family to use good hand hygiene technique  - Identify and instruct in appropriate isolation precautions for  identified infection/condition  Outcome: Progressing  Goal: Absence of fever/infection during neutropenic period  Description: INTERVENTIONS:  - Monitor WBC    Outcome: Progressing     Problem: SAFETY ADULT  Goal: Patient will remain free of falls  Description: INTERVENTIONS:  - Educate patient/family on patient safety including physical limitations  - Instruct patient to call for assistance with activity   - Consult OT/PT to assist with strengthening/mobility   - Keep Call bell within reach  - Keep bed low and locked with side rails adjusted as appropriate  - Keep care items and personal belongings within reach  - Initiate and maintain comfort rounds  - Make Fall Risk Sign visible to staff  - Offer Toileting every 2 Hours, in advance of need  - Initiate/Maintain bed/chair alarm  - Obtain necessary fall risk management equipment:   - Apply yellow socks and bracelet for high fall risk patients  - Consider moving patient to room near nurses station  Outcome: Progressing  Goal: Maintain or return to baseline ADL function  Description: INTERVENTIONS:  -  Assess patient's ability to carry out ADLs; assess patient's baseline for ADL function and identify physical deficits which impact ability to perform ADLs (bathing, care of mouth/teeth, toileting, grooming, dressing, etc.)  - Assess/evaluate cause of self-care deficits   - Assess range of motion  - Assess patient's mobility; develop plan if impaired  - Assess patient's need for assistive devices and provide as appropriate  - Encourage maximum independence but intervene and supervise when necessary  - Involve family in performance of ADLs  - Assess for home care needs following discharge   - Consider OT consult to assist with ADL evaluation and planning for discharge  - Provide patient education as appropriate  Outcome: Progressing  Goal: Maintains/Returns to pre admission functional level  Description: INTERVENTIONS:  - Perform AM-PAC 6 Click Basic Mobility/ Daily  Activity assessment daily.  - Set and communicate daily mobility goal to care team and patient/family/caregiver.   - Collaborate with rehabilitation services on mobility goals if consulted  - Perform Range of Motion 3 times a day.  - Reposition patient every 2 hours.  - Dangle patient 3 times a day  - Stand patient 3 times a day  - Ambulate patient 3 times a day  - Out of bed to chair 3 times a day   - Out of bed for meals 3 times a day  - Out of bed for toileting  - Record patient progress and toleration of activity level   Outcome: Progressing     Problem: DISCHARGE PLANNING  Goal: Discharge to home or other facility with appropriate resources  Description: INTERVENTIONS:  - Identify barriers to discharge w/patient and caregiver  - Arrange for needed discharge resources and transportation as appropriate  - Identify discharge learning needs (meds, wound care, etc.)  - Arrange for interpretive services to assist at discharge as needed  - Refer to Case Management Department for coordinating discharge planning if the patient needs post-hospital services based on physician/advanced practitioner order or complex needs related to functional status, cognitive ability, or social support system  Outcome: Progressing     Problem: Knowledge Deficit  Goal: Patient/family/caregiver demonstrates understanding of disease process, treatment plan, medications, and discharge instructions  Description: Complete learning assessment and assess knowledge base.  Interventions:  - Provide teaching at level of understanding  - Provide teaching via preferred learning methods  Outcome: Progressing

## 2024-07-02 NOTE — UTILIZATION REVIEW
NOTIFICATION OF ADMISSION DISCHARGE   This is a Notification of Discharge from [facility]. Please be advised that this patient has been discharge from our facility. Below you will find the admission and discharge date and time including the patient’s disposition.   UTILIZATION REVIEW CONTACT:  Monika Kumar  Utilization   Network Utilization Review Department  Phone: [phone] x carefully listen to the prompts. All voicemails are confidential.  Email: [email or web address]     ADMISSION INFORMATION  PRESENTATION DATE: 6/28/2024  3:23 PM  OBERVATION ADMISSION DATE: N/A  INPATIENT ADMISSION DATE: 6/28/24  8:36 PM   DISCHARGE DATE: 7/1/2024  5:59 PM   DISPOSITION:Home/Self Care    Network Utilization Review Department  ATTENTION: Please call with any questions or concerns to [phone] and carefully listen to the prompts so that you are directed to the right person. All voicemails are confidential.   For Discharge needs, contact Care Management DC Support Team at [phone] opt. 2  Send all requests for admission clinical reviews, approved or denied determinations and any other requests to dedicated fax number below belonging to the campus where the patient is receiving treatment. List of dedicated fax numbers for the Facilities:  FACILITY NAME UR FAX NUMBER   ADMISSION DENIALS (Administrative/Medical Necessity) [phone]   DISCHARGE SUPPORT TEAM ([facility]) [phone]   PARENT CHILD HEALTH (Maternity/NICU/Pediatrics) [phone]   [facility] [phone]   [facility] [phone]   [facility] [phone]   [facility] [phone]   [facility] [phone]   [facility] [phone]   [facility] [phone]   [facility]  [phone]   [facility] [phone]   [facility] [phone]   [facility] [phone]   [facility] [phone]

## 2024-07-17 ENCOUNTER — HOSPITAL ENCOUNTER (OUTPATIENT)
Dept: NON INVASIVE DIAGNOSTICS | Facility: HOSPITAL | Age: 84
Discharge: HOME | End: 2024-07-17
Payer: COMMERCIAL

## 2024-07-17 DIAGNOSIS — I65.23 BILATERAL CAROTID ARTERY STENOSIS: ICD-10-CM

## 2024-07-17 PROCEDURE — 93880 EXTRACRANIAL BILAT STUDY: CPT

## 2024-07-18 PROCEDURE — 93880 EXTRACRANIAL BILAT STUDY: CPT | Performed by: SURGERY

## 2024-07-19 ENCOUNTER — TELEPHONE (OUTPATIENT)
Dept: VASCULAR SURGERY | Facility: CLINIC | Age: 84
End: 2024-07-19

## 2024-07-19 NOTE — TELEPHONE ENCOUNTER
Attempted to contact patient to schedule appointment(s) listed below.  Requested patient call [phone] option 3 to schedule appointment(s).    Patient's appointment(s) are due in sept    Dopplers  [] Abdominal Aorta Iliac (AOIL)  [x] Carotid (CV) 7/17/24  [] Celiac and/or Mesenteric  [] Endovascular Aortic Repair (EVAR)   [] Hemodialysis Access (HD)   [] Lower Limb Arterial (ISAK)  [] Lower Limb Venous (LEV)  [] Lower Limb Venous Duplex with Reflux (LEVDR)  [] Renal Artery  [] Upper Limb Arterial (UEA)    [] Upper Limb Venous (UEV)              [] GUS and Waveform analysis     Advanced Imaging   [] CTA head/neck    [] CTA abdomen    [] CTA abdomen & pelvis    [] CT abdomen with/ without contrast  [] CT abdomen with contrast  [] CT abdomen without contrast    [] CT abdomen & pelvis with/ without contrast  [] CT abdomen & pelvis with contrast  [] CT abdomen & pelvis without contrast    Office Visit   [] New patient, patient last seen over 3 years ago  [] Risk factor modification (RFM)   [x] Follow up   [] Lost to follow up (LTFU)  pt to see dr ina blount/nelson man 6/4/24

## 2024-07-22 ENCOUNTER — HOSPITAL ENCOUNTER (INPATIENT)
Facility: HOSPITAL | Age: 84
LOS: 1 days | Discharge: LEFT BEFORE DISCHARGE | DRG: 379 | End: 2024-07-22
Attending: EMERGENCY MEDICINE | Admitting: INTERNAL MEDICINE
Payer: COMMERCIAL

## 2024-07-22 ENCOUNTER — APPOINTMENT (EMERGENCY)
Dept: CT IMAGING | Facility: HOSPITAL | Age: 84
DRG: 379 | End: 2024-07-22
Payer: COMMERCIAL

## 2024-07-22 VITALS
WEIGHT: 158.95 LBS | HEART RATE: 59 BPM | BODY MASS INDEX: 22.81 KG/M2 | TEMPERATURE: 97.7 F | DIASTOLIC BLOOD PRESSURE: 84 MMHG | RESPIRATION RATE: 18 BRPM | OXYGEN SATURATION: 98 % | SYSTOLIC BLOOD PRESSURE: 172 MMHG

## 2024-07-22 DIAGNOSIS — K62.5 RECTAL BLEEDING: Primary | ICD-10-CM

## 2024-07-22 LAB
ABO GROUP BLD: NORMAL
ALBUMIN SERPL BCG-MCNC: 4.2 G/DL (ref 3.5–5)
ALP SERPL-CCNC: 84 U/L (ref 34–104)
ALT SERPL W P-5'-P-CCNC: 14 U/L (ref 7–52)
ANION GAP SERPL CALCULATED.3IONS-SCNC: 8 MMOL/L (ref 4–13)
APTT PPP: 38 SECONDS (ref 23–37)
AST SERPL W P-5'-P-CCNC: 13 U/L (ref 13–39)
BASOPHILS # BLD AUTO: 0.04 THOUSANDS/ÂΜL (ref 0–0.1)
BASOPHILS NFR BLD AUTO: 1 % (ref 0–1)
BILIRUB SERPL-MCNC: 0.69 MG/DL (ref 0.2–1)
BILIRUB UR QL STRIP: NEGATIVE
BLD GP AB SCN SERPL QL: NEGATIVE
BUN SERPL-MCNC: 21 MG/DL (ref 5–25)
CALCIUM SERPL-MCNC: 9.2 MG/DL (ref 8.4–10.2)
CHLORIDE SERPL-SCNC: 112 MMOL/L (ref 96–108)
CLARITY UR: CLEAR
CO2 SERPL-SCNC: 23 MMOL/L (ref 21–32)
COLOR UR: YELLOW
CREAT SERPL-MCNC: 1.26 MG/DL (ref 0.6–1.3)
EOSINOPHIL # BLD AUTO: 0.14 THOUSAND/ÂΜL (ref 0–0.61)
EOSINOPHIL NFR BLD AUTO: 2 % (ref 0–6)
ERYTHROCYTE [DISTWIDTH] IN BLOOD BY AUTOMATED COUNT: 13.5 % (ref 11.6–15.1)
GFR SERPL CREATININE-BSD FRML MDRD: 53 ML/MIN/1.73SQ M
GLUCOSE SERPL-MCNC: 141 MG/DL (ref 65–140)
GLUCOSE UR STRIP-MCNC: NEGATIVE MG/DL
HCT VFR BLD AUTO: 41.5 % (ref 36.5–49.3)
HGB BLD-MCNC: 13.7 G/DL (ref 12–17)
HGB UR QL STRIP.AUTO: NEGATIVE
IMM GRANULOCYTES # BLD AUTO: 0.04 THOUSAND/UL (ref 0–0.2)
IMM GRANULOCYTES NFR BLD AUTO: 1 % (ref 0–2)
INR PPP: 1.04 (ref 0.84–1.19)
KETONES UR STRIP-MCNC: NEGATIVE MG/DL
LACTATE SERPL-SCNC: 1.2 MMOL/L (ref 0.5–2)
LEUKOCYTE ESTERASE UR QL STRIP: NEGATIVE
LYMPHOCYTES # BLD AUTO: 1.68 THOUSANDS/ÂΜL (ref 0.6–4.47)
LYMPHOCYTES NFR BLD AUTO: 20 % (ref 14–44)
MCH RBC QN AUTO: 31.6 PG (ref 26.8–34.3)
MCHC RBC AUTO-ENTMCNC: 33 G/DL (ref 31.4–37.4)
MCV RBC AUTO: 96 FL (ref 82–98)
MONOCYTES # BLD AUTO: 0.61 THOUSAND/ÂΜL (ref 0.17–1.22)
MONOCYTES NFR BLD AUTO: 7 % (ref 4–12)
NEUTROPHILS # BLD AUTO: 6.06 THOUSANDS/ÂΜL (ref 1.85–7.62)
NEUTS SEG NFR BLD AUTO: 69 % (ref 43–75)
NITRITE UR QL STRIP: NEGATIVE
NRBC BLD AUTO-RTO: 0 /100 WBCS
PH UR STRIP.AUTO: 6.5 [PH]
PLATELET # BLD AUTO: 144 THOUSANDS/UL (ref 149–390)
PMV BLD AUTO: 11.4 FL (ref 8.9–12.7)
POTASSIUM SERPL-SCNC: 3.9 MMOL/L (ref 3.5–5.3)
PROT SERPL-MCNC: 6.9 G/DL (ref 6.4–8.4)
PROT UR STRIP-MCNC: NEGATIVE MG/DL
PROTHROMBIN TIME: 13.9 SECONDS (ref 11.6–14.5)
RBC # BLD AUTO: 4.34 MILLION/UL (ref 3.88–5.62)
RH BLD: POSITIVE
SODIUM SERPL-SCNC: 143 MMOL/L (ref 135–147)
SP GR UR STRIP.AUTO: 1.01 (ref 1–1.03)
SPECIMEN EXPIRATION DATE: NORMAL
UROBILINOGEN UR QL STRIP.AUTO: 0.2 E.U./DL
WBC # BLD AUTO: 8.57 THOUSAND/UL (ref 4.31–10.16)

## 2024-07-22 PROCEDURE — 86850 RBC ANTIBODY SCREEN: CPT | Performed by: PHYSICIAN ASSISTANT

## 2024-07-22 PROCEDURE — 96365 THER/PROPH/DIAG IV INF INIT: CPT

## 2024-07-22 PROCEDURE — 85610 PROTHROMBIN TIME: CPT | Performed by: PHYSICIAN ASSISTANT

## 2024-07-22 PROCEDURE — 86901 BLOOD TYPING SEROLOGIC RH(D): CPT | Performed by: PHYSICIAN ASSISTANT

## 2024-07-22 PROCEDURE — 96361 HYDRATE IV INFUSION ADD-ON: CPT

## 2024-07-22 PROCEDURE — 74178 CT ABD&PLV WO CNTR FLWD CNTR: CPT

## 2024-07-22 PROCEDURE — 99285 EMERGENCY DEPT VISIT HI MDM: CPT | Performed by: PHYSICIAN ASSISTANT

## 2024-07-22 PROCEDURE — 81003 URINALYSIS AUTO W/O SCOPE: CPT | Performed by: PHYSICIAN ASSISTANT

## 2024-07-22 PROCEDURE — 82272 OCCULT BLD FECES 1-3 TESTS: CPT

## 2024-07-22 PROCEDURE — 86900 BLOOD TYPING SEROLOGIC ABO: CPT | Performed by: PHYSICIAN ASSISTANT

## 2024-07-22 PROCEDURE — 99285 EMERGENCY DEPT VISIT HI MDM: CPT

## 2024-07-22 PROCEDURE — 80053 COMPREHEN METABOLIC PANEL: CPT | Performed by: PHYSICIAN ASSISTANT

## 2024-07-22 PROCEDURE — 36415 COLL VENOUS BLD VENIPUNCTURE: CPT | Performed by: PHYSICIAN ASSISTANT

## 2024-07-22 PROCEDURE — 85025 COMPLETE CBC W/AUTO DIFF WBC: CPT | Performed by: PHYSICIAN ASSISTANT

## 2024-07-22 PROCEDURE — 85730 THROMBOPLASTIN TIME PARTIAL: CPT | Performed by: PHYSICIAN ASSISTANT

## 2024-07-22 PROCEDURE — 83605 ASSAY OF LACTIC ACID: CPT | Performed by: PHYSICIAN ASSISTANT

## 2024-07-22 RX ADMIN — SODIUM CHLORIDE 500 ML: 0.9 INJECTION, SOLUTION INTRAVENOUS at 10:10

## 2024-07-22 RX ADMIN — IOHEXOL 100 ML: 350 INJECTION, SOLUTION INTRAVENOUS at 11:32

## 2024-07-22 RX ADMIN — PANTOPRAZOLE SODIUM 80 MG: 40 INJECTION, POWDER, LYOPHILIZED, FOR SOLUTION INTRAVENOUS at 10:44

## 2024-07-22 NOTE — Clinical Note
Case was discussed with brandon  and the patient's admission status was agreed to be Admission Status: inpatient status to the service of Dr. taylor .

## 2024-07-23 ENCOUNTER — TELEPHONE (OUTPATIENT)
Age: 84
End: 2024-07-23

## 2024-07-23 NOTE — TELEPHONE ENCOUNTER
1ST ATTEMPT,     Called pt no answer, LMOM.    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 He will not require outpatient neurological testing.

## 2024-08-13 ENCOUNTER — OFFICE VISIT (OUTPATIENT)
Dept: GASTROENTEROLOGY | Facility: MEDICAL CENTER | Age: 84
End: 2024-08-13
Payer: COMMERCIAL

## 2024-08-13 ENCOUNTER — TELEPHONE (OUTPATIENT)
Dept: GASTROENTEROLOGY | Facility: MEDICAL CENTER | Age: 84
End: 2024-08-13

## 2024-08-13 VITALS
TEMPERATURE: 98.3 F | DIASTOLIC BLOOD PRESSURE: 80 MMHG | HEART RATE: 84 BPM | SYSTOLIC BLOOD PRESSURE: 146 MMHG | BODY MASS INDEX: 22.76 KG/M2 | OXYGEN SATURATION: 96 % | HEIGHT: 70 IN | WEIGHT: 159 LBS

## 2024-08-13 DIAGNOSIS — K31.9 GASTRIC LESION: ICD-10-CM

## 2024-08-13 DIAGNOSIS — K62.5 RECTAL BLEEDING: Primary | ICD-10-CM

## 2024-08-13 DIAGNOSIS — D69.6 THROMBOCYTOPENIA (HCC): ICD-10-CM

## 2024-08-13 DIAGNOSIS — K56.609 SBO (SMALL BOWEL OBSTRUCTION) (HCC): ICD-10-CM

## 2024-08-13 DIAGNOSIS — R63.4 UNINTENTIONAL WEIGHT LOSS: ICD-10-CM

## 2024-08-13 PROCEDURE — 99205 OFFICE O/P NEW HI 60 MIN: CPT | Performed by: INTERNAL MEDICINE

## 2024-08-13 RX ORDER — POLYETHYLENE GLYCOL-3350 AND ELECTROLYTES 236; 6.74; 5.86; 2.97; 22.74 G/274.31G; G/274.31G; G/274.31G; G/274.31G; G/274.31G
4 POWDER, FOR SOLUTION ORAL ONCE
Qty: 4000 ML | Refills: 0 | Status: SHIPPED | OUTPATIENT
Start: 2024-08-13 | End: 2024-08-13

## 2024-08-13 RX ORDER — BISACODYL 5 MG/1
10 TABLET, DELAYED RELEASE ORAL ONCE
Qty: 2 TABLET | Refills: 0 | Status: SHIPPED | OUTPATIENT
Start: 2024-08-13 | End: 2024-08-13

## 2024-08-13 NOTE — TELEPHONE ENCOUNTER
Patient was seen today needed a follow up but provider is booked til Jan 2025 patient wants to be seen sooner for follow up. Will talk to provider to see what she advise.

## 2024-08-13 NOTE — PROGRESS NOTES
[facility] - Outpatient Consultation  Joe Aviles 83 y.o. male MRN: [id number]  Encounter: [id number]          ASSESSMENT AND PLAN:      1. SBO (small bowel obstruction) (HCC)  Previous admission to the hospital with small bowel obstruction likely the side effect of excessive Imodium use.  Patient is currently not on Imodium.  He reported he move his bowel 1-2 times a day with sense of complete evacuation.  He denies any ongoing bleeding.  - Ambulatory referral to Gastroenterology    2. Rectal bleeding  Colonoscopy 6 months ago showed small polyps removed.  Rectal bleeding might be secondary to hemorrhoidal bleeding.  Will have to repeat colonoscopy to rule out any other bleeding lesions.  - Colonoscopy; Future  - bisacodyl (DULCOLAX) 5 mg EC tablet; Take 2 tablets (10 mg total) by mouth once for 1 dose  Dispense: 2 tablet; Refill: 0    3. Unintentional weight loss  4. Gastric lesion  Patient has unintentional weight loss he was found to have a gastric lesion 6 months ago with no biopsy results.  Plan for endoscopic ultrasound with FNA to evaluate gastric lesion.  - Endoscopic ultrasonography, GI (Upper); Future    I sent a message to Dr. Barajas to discuss the timing of EUS and colonoscopy.  Saul Sanford,    I saw Joe in the office today. He was scoped in [facility] in Jan found a mass in the stomach and will need EUS but never followed. Now He has rectal bleeding and referred to me from ED. Symptoms resolved, no ongoing bleeding. He will need EUS/FNA and colonoscopy.     You placed stents in his carotid artery in May, can his plavix be held so we can proceed with FNA and colonoscopy?     Let me know so that I will schedule him ASAP.     Thanks.   Petr Matthews     I have spent a total time of 60 minutes in caring for this patient on the day of the visit/encounter including Diagnostic results, Patient and family education, Impressions, Counseling / Coordination of care, Documenting in the  medical record, Reviewing / ordering tests, medicine, procedures  , Obtaining or reviewing history  , and Communicating with other healthcare professionals .   ______________________________________________________________________    HPI:      83-year-old man with stroke status post carotid stent on aspirin and Plavix, mild dementia presented for evaluation of rectal bleeding.  Patient recently (7/22) presented to emergency room after multiple episodes of blood mixed with stool.  CT in the emergency room showed no acute bleeding and hemoglobin was normal at 13.5.  He was discharged and referred to our service.  Patient reported bleeding episode has resolved and he resumed Plavix and aspirin.  He underwent a colonoscopy in January 2024 at [facility] with small polyps removed.  I could not access the report but previous documentation from [facility] showed gastric mass and biopsy was not available.  Patient was to follow-up with [facility] for gastric lesion but was not able to follow-up.  He had carotid stents placed in May 2024 and was started on Plavix and aspirin.  His wife reported patient had 10+ pounds unintentional weight loss.  He was then admitted to the hospital in June 2024 for small bowel obstruction.  He reported he had some diarrhea and has been taking excessive amount of Imodium.  Symptoms resolved without surgical intervention.      REVIEW OF SYSTEMS:    CONSTITUTIONAL: Denies any fever, chills, rigors, and weight loss.  HEENT: No earache or tinnitus. Denies hearing loss or visual disturbances.  CARDIOVASCULAR: No chest pain or palpitations.   RESPIRATORY: Denies any cough, hemoptysis, shortness of breath or dyspnea on exertion.  GASTROINTESTINAL: As noted in the History of Present Illness.   GENITOURINARY: No problems with urination. Denies any hematuria or dysuria.  NEUROLOGIC: No dizziness or vertigo, denies headaches.   MUSCULOSKELETAL: Denies any muscle or  joint pain.   SKIN: Denies skin rashes or itching.   ENDOCRINE: Denies excessive thirst. Denies intolerance to heat or cold.  PSYCHOSOCIAL: Denies depression or anxiety. Denies any recent memory loss.       Historical Information   Past Medical History:   Diagnosis Date    BPH (benign prostatic hyperplasia)     Colon polyp     Dementia (HCC)     Diverticulitis     Hypertension     Macular degeneration     Stroke (HCC)      Past Surgical History:   Procedure Laterality Date    COLONOSCOPY      CORONARY ANGIOPLASTY WITH STENT PLACEMENT      CT CYSTOGRAM  1/14/2022    CYSTOSCOPY      TX TCAT IV STENT CRV CRTD ART EMBOLIC PROTECJ Right 05/22/2024    Procedure: ANGIOPLASTY ARTERY CAROTID W/ STENT;  Surgeon: Sven Barajas DO;  Location: [facility];  Service: Vascular    PROSTATE SURGERY      TONSILLECTOMY      VASECTOMY       Social History   Social History     Substance and Sexual Activity   Alcohol Use Not Currently     Social History     Substance and Sexual Activity   Drug Use No     Social History     Tobacco Use   Smoking Status Every Day    Current packs/day: 0.50    Average packs/day: 0.5 packs/day for 67.6 years (33.8 ttl pk-yrs)    Types: Cigarettes    Start date: 1957   Smokeless Tobacco Never     Family History   Problem Relation Age of Onset    Brain cancer Mother     Cancer Brother         Bladder    Brain cancer Child        Meds/Allergies       Current Outpatient Medications:     acetaminophen (TYLENOL) 325 mg tablet    amLODIPine (NORVASC) 5 mg tablet    aspirin 81 mg chewable tablet    atorvastatin (LIPITOR) 40 mg tablet    bisacodyl (DULCOLAX) 5 mg EC tablet    clopidogrel (PLAVIX) 75 mg tablet    memantine (NAMENDA) 10 mg tablet    Multiple Vitamins-Minerals (OCUVITE PO)    polyethylene glycol (GaviLyte-G) 4000 mL solution    propranolol (INDERAL) 10 mg tablet    Multiple Vitamin (MULTIVITAMIN) capsule    nicotine (NICODERM CQ) 7 mg/24hr TD 24 hr patch    tamsulosin (FLOMAX) 0.4 mg    No Known  "Allergies        Objective     Blood pressure 146/80, pulse 84, temperature 98.3 °F (36.8 °C), temperature source Tympanic, height 5' 10\" (1.778 m), weight 72.1 kg (159 lb), SpO2 96%. Body mass index is 22.81 kg/m².        PHYSICAL EXAM:      General Appearance:   Alert, cooperative, no distress   HEENT:   Normocephalic, atraumatic, anicteric.     Neck:  Supple, symmetrical, trachea midline   Lungs:   Clear to auscultation bilaterally; no rales, rhonchi or wheezing; respirations unlabored    Heart::   Regular rate and rhythm; no murmur, rub, or gallop.   Abdomen:   Soft, non-tender, non-distended; normal bowel sounds; no masses, no organomegaly    Genitalia:   Deferred    Rectal:   Deferred    Extremities:  No cyanosis, clubbing or edema    Pulses:  2+ and symmetric    Skin:  No jaundice, rashes, or lesions    Lymph nodes:  No palpable cervical lymphadenopathy        Lab Results:   No visits with results within 1 Day(s) from this visit.   Latest known visit with results is:   Admission on 07/22/2024, Discharged on 07/22/2024   Component Date Value    WBC 07/22/2024 8.57     RBC 07/22/2024 4.34     Hemoglobin 07/22/2024 13.7     Hematocrit 07/22/2024 41.5     MCV 07/22/2024 96     MCH 07/22/2024 31.6     MCHC 07/22/2024 33.0     RDW 07/22/2024 13.5     MPV 07/22/2024 11.4     Platelets 07/22/2024 144 (L)     nRBC 07/22/2024 0     Segmented % 07/22/2024 69     Immature Grans % 07/22/2024 1     Lymphocytes % 07/22/2024 20     Monocytes % 07/22/2024 7     Eosinophils Relative 07/22/2024 2     Basophils Relative 07/22/2024 1     Absolute Neutrophils 07/22/2024 6.06     Absolute Immature Grans 07/22/2024 0.04     Absolute Lymphocytes 07/22/2024 1.68     Absolute Monocytes 07/22/2024 0.61     Eosinophils Absolute 07/22/2024 0.14     Basophils Absolute 07/22/2024 0.04     Sodium 07/22/2024 143     Potassium 07/22/2024 3.9     Chloride 07/22/2024 112 (H)     CO2 07/22/2024 23     ANION GAP 07/22/2024 8     BUN 07/22/2024 21  " "   Creatinine 07/22/2024 1.26     Glucose 07/22/2024 141 (H)     Calcium 07/22/2024 9.2     AST 07/22/2024 13     ALT 07/22/2024 14     Alkaline Phosphatase 07/22/2024 84     Total Protein 07/22/2024 6.9     Albumin 07/22/2024 4.2     Total Bilirubin 07/22/2024 0.69     eGFR 07/22/2024 53     ABO Grouping 07/22/2024 O     Rh Factor 07/22/2024 Positive     Antibody Screen 07/22/2024 Negative     Specimen Expiration Date 07/22/2024 [id number]     Color, UA 07/22/2024 Yellow     Clarity, UA 07/22/2024 Clear     Specific Gravity, UA 07/22/2024 1.015     pH, UA 07/22/2024 6.5     Leukocytes, UA 07/22/2024 Negative     Nitrite, UA 07/22/2024 Negative     Protein, UA 07/22/2024 Negative     Glucose, UA 07/22/2024 Negative     Ketones, UA 07/22/2024 Negative     Urobilinogen, UA 07/22/2024 0.2     Bilirubin, UA 07/22/2024 Negative     Occult Blood, UA 07/22/2024 Negative     Protime 07/22/2024 13.9     INR 07/22/2024 1.04     PTT 07/22/2024 38 (H)     LACTIC ACID 07/22/2024 1.2          Radiology Results:   CT high volume bleeding scan abdomen pelvis    Result Date: 7/22/2024  Narrative: CT ABDOMEN AND PELVIS - WITHOUT AND WITH IV CONTRAST INDICATION:   GI bleeding on examinatiohn.  \"The patient is an 83-year-old male with a past medical history of stroke, hypertension, dementia, CAD on Plavix and aspirin who presents with the chief complaint of rectal bleeding. Patient had several episodes of rectal bleeding on Saturday around 6 episodes Sunday and had only 1 this morning. Patient states that he was lifting heavy on Saturday and then started having these episodes. States that he feels the urge to have a bowel movement and they are  bright red bowel movements. States that this morning there was some brown stool mixed with the blood. Denies any abdominal cramping fevers chills nausea vomiting falls or traumas. Did continue his aspirin Plavix.\" COMPARISON: CT abdomen pelvis 6/20/2024. TECHNIQUE: CT examination of the abdomen " and pelvis was performed both prior to and after the administration of intravenous contrast. Multiplanar 2D reformatted images were created from the source data. Radiation dose length product (DLP) for this visit: 1946 mGy-cm . This examination, like all CT scans performed in the [facility], was performed utilizing techniques to minimize radiation dose exposure, including the use of iterative reconstruction and automated exposure control. IV Contrast: 100 mL of iohexol (OMNIPAQUE) Enteric Contrast: Not administered. FINDINGS: ABDOMEN BOWEL:No active extravasation of intravenous contrast into the lumen of stomach, small bowel, or large bowel loops. No abnormal bowel wall thickening or pathologic bowel wall enhancement. LOWER CHEST: No clinically significant abnormality in the visualized lower chest. LIVER/BILIARY TREE: Unchanged simple cysts. Unchanged scattered suspected hemangiomas GALLBLADDER: No calcified gallstones. No pericholecystic inflammatory change. SPLEEN: Unremarkable. PANCREAS: Unremarkable. ADRENAL GLANDS: Unremarkable. KIDNEYS/URETERS: No hydronephrosis or urinary tract calculi. Subcentimeter hypoattenuating renal lesion(s), too small to characterize but statistically likely benign, which do not warrant follow-up (Radiology June 2019). APPENDIX: No findings to suggest appendicitis. ABDOMINOPELVIC CAVITY: No ascites. No pneumoperitoneum. No lymphadenopathy. VESSELS: Reidentified atherosclerotic changes with infrarenal abdominal aortic ectasia measuring up to 27 mm. PELVIS REPRODUCTIVE ORGANS: Prostamegaly. URINARY BLADDER: Mild wall thickening in keeping with outlet obstruction. ABDOMINAL WALL/INGUINAL REGIONS: Uncomplicated fat-containing umbilical hernia. BONES: No acute fracture or suspicious osseous lesion.     Impression: No CT evidence of active high-volume gastrointestinal hemorrhage. No acute findings in the abdomen or pelvis. Workstation performed: [id number]     VAS carotid  complete study    Result Date: 7/18/2024  Narrative:  [facility] REPORT CLINICAL: Indications: Initial Post-op evaluation s/p revascularization.  Patient is asymptomatic at this time. Operative History: 2024-05-22 Right TCAR Risk Factors The patient has history of HTN and smoking (current) 0.5 ppd.  FINDINGS:  Right              Impression     PSV  EDV (cm/s)  Direction of Flow  Ratio  Dist. ICA                          59          22                      0.81  Mid. ICA                           54          18                      0.74  Prox. ICA - Stent  Widely Patent   54          17                      0.74  Dist CCA                           59          16                            Mid CCA                            72          21                      0.77  Prox CCA                           94          21                            Ext Carotid                        66          10                      0.91  Prox Vert                          42           8  Antegrade                 Subclavian                         57           0                             Left               Impression  PSV  EDV (cm/s)  Direction of Flow  Ratio  Dist. ICA                       57          20                      0.93  Mid. ICA                       192          66                      3.13  Prox. ICA          70 - 99%    329         117                      5.37  Mid CCA                         61          16                      0.86  Prox CCA                        72          18                            Ext Carotid                     96          15                      1.56  Prox Vert                       39          12  Antegrade                 Subclavian                      85           0                               CONCLUSION: Impression RIGHT: Widely patent internal carotid artery and endarterectomy site. Vertebral artery flow is antegrade. There is no significant subclavian artery disease.  LEFT:  There is 70-99% stenosis noted in the internal carotid artery. Plaque is heterogenous and irregular. Vertebral artery flow is antegrade. There is no significant subclavian artery disease.  Compared to previous study on 5/14/2024, there is wide patency of the right ICA s/p TCAR. Recommend repeat testing in 6 months as per protocol unless otherwise indicated.  SIGNATURE: Electronically Signed by: ZAY OCHOA MD on 2024-07-18 04:58:17 PM

## 2024-08-13 NOTE — H&P (VIEW-ONLY)
[facility] - Outpatient Consultation  Joe Aviles 83 y.o. male MRN: [id number]  Encounter: [id number]          ASSESSMENT AND PLAN:      1. SBO (small bowel obstruction) (HCC)  Previous admission to the hospital with small bowel obstruction likely the side effect of excessive Imodium use.  Patient is currently not on Imodium.  He reported he move his bowel 1-2 times a day with sense of complete evacuation.  He denies any ongoing bleeding.  - Ambulatory referral to Gastroenterology    2. Rectal bleeding  Colonoscopy 6 months ago showed small polyps removed.  Rectal bleeding might be secondary to hemorrhoidal bleeding.  Will have to repeat colonoscopy to rule out any other bleeding lesions.  - Colonoscopy; Future  - bisacodyl (DULCOLAX) 5 mg EC tablet; Take 2 tablets (10 mg total) by mouth once for 1 dose  Dispense: 2 tablet; Refill: 0    3. Unintentional weight loss  4. Gastric lesion  Patient has unintentional weight loss he was found to have a gastric lesion 6 months ago with no biopsy results.  Plan for endoscopic ultrasound with FNA to evaluate gastric lesion.  - Endoscopic ultrasonography, GI (Upper); Future    I sent a message to Dr. Barajas to discuss the timing of EUS and colonoscopy.  Saul Sanford,    I saw Joe in the office today. He was scoped in [facility] in Jan found a mass in the stomach and will need EUS but never followed. Now He has rectal bleeding and referred to me from ED. Symptoms resolved, no ongoing bleeding. He will need EUS/FNA and colonoscopy.     You placed stents in his carotid artery in May, can his plavix be held so we can proceed with FNA and colonoscopy?     Let me know so that I will schedule him ASAP.     Thanks.   Petr Matthews     I have spent a total time of 60 minutes in caring for this patient on the day of the visit/encounter including Diagnostic results, Patient and family education, Impressions, Counseling / Coordination of care, Documenting in the  medical record, Reviewing / ordering tests, medicine, procedures  , Obtaining or reviewing history  , and Communicating with other healthcare professionals .   ______________________________________________________________________    HPI:      83-year-old man with stroke status post carotid stent on aspirin and Plavix, mild dementia presented for evaluation of rectal bleeding.  Patient recently (7/22) presented to emergency room after multiple episodes of blood mixed with stool.  CT in the emergency room showed no acute bleeding and hemoglobin was normal at 13.5.  He was discharged and referred to our service.  Patient reported bleeding episode has resolved and he resumed Plavix and aspirin.  He underwent a colonoscopy in January 2024 at [facility] with small polyps removed.  I could not access the report but previous documentation from [facility] showed gastric mass and biopsy was not available.  Patient was to follow-up with [facility] for gastric lesion but was not able to follow-up.  He had carotid stents placed in May 2024 and was started on Plavix and aspirin.  His wife reported patient had 10+ pounds unintentional weight loss.  He was then admitted to the hospital in June 2024 for small bowel obstruction.  He reported he had some diarrhea and has been taking excessive amount of Imodium.  Symptoms resolved without surgical intervention.      REVIEW OF SYSTEMS:    CONSTITUTIONAL: Denies any fever, chills, rigors, and weight loss.  HEENT: No earache or tinnitus. Denies hearing loss or visual disturbances.  CARDIOVASCULAR: No chest pain or palpitations.   RESPIRATORY: Denies any cough, hemoptysis, shortness of breath or dyspnea on exertion.  GASTROINTESTINAL: As noted in the History of Present Illness.   GENITOURINARY: No problems with urination. Denies any hematuria or dysuria.  NEUROLOGIC: No dizziness or vertigo, denies headaches.   MUSCULOSKELETAL: Denies any muscle or  joint pain.   SKIN: Denies skin rashes or itching.   ENDOCRINE: Denies excessive thirst. Denies intolerance to heat or cold.  PSYCHOSOCIAL: Denies depression or anxiety. Denies any recent memory loss.       Historical Information   Past Medical History:   Diagnosis Date    BPH (benign prostatic hyperplasia)     Colon polyp     Dementia (HCC)     Diverticulitis     Hypertension     Macular degeneration     Stroke (HCC)      Past Surgical History:   Procedure Laterality Date    COLONOSCOPY      CORONARY ANGIOPLASTY WITH STENT PLACEMENT      CT CYSTOGRAM  1/14/2022    CYSTOSCOPY      MI TCAT IV STENT CRV CRTD ART EMBOLIC PROTECJ Right 05/22/2024    Procedure: ANGIOPLASTY ARTERY CAROTID W/ STENT;  Surgeon: Sven Barajas DO;  Location: [facility];  Service: Vascular    PROSTATE SURGERY      TONSILLECTOMY      VASECTOMY       Social History   Social History     Substance and Sexual Activity   Alcohol Use Not Currently     Social History     Substance and Sexual Activity   Drug Use No     Social History     Tobacco Use   Smoking Status Every Day    Current packs/day: 0.50    Average packs/day: 0.5 packs/day for 67.6 years (33.8 ttl pk-yrs)    Types: Cigarettes    Start date: 1957   Smokeless Tobacco Never     Family History   Problem Relation Age of Onset    Brain cancer Mother     Cancer Brother         Bladder    Brain cancer Child        Meds/Allergies       Current Outpatient Medications:     acetaminophen (TYLENOL) 325 mg tablet    amLODIPine (NORVASC) 5 mg tablet    aspirin 81 mg chewable tablet    atorvastatin (LIPITOR) 40 mg tablet    bisacodyl (DULCOLAX) 5 mg EC tablet    clopidogrel (PLAVIX) 75 mg tablet    memantine (NAMENDA) 10 mg tablet    Multiple Vitamins-Minerals (OCUVITE PO)    polyethylene glycol (GaviLyte-G) 4000 mL solution    propranolol (INDERAL) 10 mg tablet    Multiple Vitamin (MULTIVITAMIN) capsule    nicotine (NICODERM CQ) 7 mg/24hr TD 24 hr patch    tamsulosin (FLOMAX) 0.4 mg    No Known  "Allergies        Objective     Blood pressure 146/80, pulse 84, temperature 98.3 °F (36.8 °C), temperature source Tympanic, height 5' 10\" (1.778 m), weight 72.1 kg (159 lb), SpO2 96%. Body mass index is 22.81 kg/m².        PHYSICAL EXAM:      General Appearance:   Alert, cooperative, no distress   HEENT:   Normocephalic, atraumatic, anicteric.     Neck:  Supple, symmetrical, trachea midline   Lungs:   Clear to auscultation bilaterally; no rales, rhonchi or wheezing; respirations unlabored    Heart::   Regular rate and rhythm; no murmur, rub, or gallop.   Abdomen:   Soft, non-tender, non-distended; normal bowel sounds; no masses, no organomegaly    Genitalia:   Deferred    Rectal:   Deferred    Extremities:  No cyanosis, clubbing or edema    Pulses:  2+ and symmetric    Skin:  No jaundice, rashes, or lesions    Lymph nodes:  No palpable cervical lymphadenopathy        Lab Results:   No visits with results within 1 Day(s) from this visit.   Latest known visit with results is:   Admission on 07/22/2024, Discharged on 07/22/2024   Component Date Value    WBC 07/22/2024 8.57     RBC 07/22/2024 4.34     Hemoglobin 07/22/2024 13.7     Hematocrit 07/22/2024 41.5     MCV 07/22/2024 96     MCH 07/22/2024 31.6     MCHC 07/22/2024 33.0     RDW 07/22/2024 13.5     MPV 07/22/2024 11.4     Platelets 07/22/2024 144 (L)     nRBC 07/22/2024 0     Segmented % 07/22/2024 69     Immature Grans % 07/22/2024 1     Lymphocytes % 07/22/2024 20     Monocytes % 07/22/2024 7     Eosinophils Relative 07/22/2024 2     Basophils Relative 07/22/2024 1     Absolute Neutrophils 07/22/2024 6.06     Absolute Immature Grans 07/22/2024 0.04     Absolute Lymphocytes 07/22/2024 1.68     Absolute Monocytes 07/22/2024 0.61     Eosinophils Absolute 07/22/2024 0.14     Basophils Absolute 07/22/2024 0.04     Sodium 07/22/2024 143     Potassium 07/22/2024 3.9     Chloride 07/22/2024 112 (H)     CO2 07/22/2024 23     ANION GAP 07/22/2024 8     BUN 07/22/2024 21  " "   Creatinine 07/22/2024 1.26     Glucose 07/22/2024 141 (H)     Calcium 07/22/2024 9.2     AST 07/22/2024 13     ALT 07/22/2024 14     Alkaline Phosphatase 07/22/2024 84     Total Protein 07/22/2024 6.9     Albumin 07/22/2024 4.2     Total Bilirubin 07/22/2024 0.69     eGFR 07/22/2024 53     ABO Grouping 07/22/2024 O     Rh Factor 07/22/2024 Positive     Antibody Screen 07/22/2024 Negative     Specimen Expiration Date 07/22/2024 [id number]     Color, UA 07/22/2024 Yellow     Clarity, UA 07/22/2024 Clear     Specific Gravity, UA 07/22/2024 1.015     pH, UA 07/22/2024 6.5     Leukocytes, UA 07/22/2024 Negative     Nitrite, UA 07/22/2024 Negative     Protein, UA 07/22/2024 Negative     Glucose, UA 07/22/2024 Negative     Ketones, UA 07/22/2024 Negative     Urobilinogen, UA 07/22/2024 0.2     Bilirubin, UA 07/22/2024 Negative     Occult Blood, UA 07/22/2024 Negative     Protime 07/22/2024 13.9     INR 07/22/2024 1.04     PTT 07/22/2024 38 (H)     LACTIC ACID 07/22/2024 1.2          Radiology Results:   CT high volume bleeding scan abdomen pelvis    Result Date: 7/22/2024  Narrative: CT ABDOMEN AND PELVIS - WITHOUT AND WITH IV CONTRAST INDICATION:   GI bleeding on examinatiohn.  \"The patient is an 83-year-old male with a past medical history of stroke, hypertension, dementia, CAD on Plavix and aspirin who presents with the chief complaint of rectal bleeding. Patient had several episodes of rectal bleeding on Saturday around 6 episodes Sunday and had only 1 this morning. Patient states that he was lifting heavy on Saturday and then started having these episodes. States that he feels the urge to have a bowel movement and they are  bright red bowel movements. States that this morning there was some brown stool mixed with the blood. Denies any abdominal cramping fevers chills nausea vomiting falls or traumas. Did continue his aspirin Plavix.\" COMPARISON: CT abdomen pelvis 6/20/2024. TECHNIQUE: CT examination of the abdomen " and pelvis was performed both prior to and after the administration of intravenous contrast. Multiplanar 2D reformatted images were created from the source data. Radiation dose length product (DLP) for this visit: 1946 mGy-cm . This examination, like all CT scans performed in the [facility], was performed utilizing techniques to minimize radiation dose exposure, including the use of iterative reconstruction and automated exposure control. IV Contrast: 100 mL of iohexol (OMNIPAQUE) Enteric Contrast: Not administered. FINDINGS: ABDOMEN BOWEL:No active extravasation of intravenous contrast into the lumen of stomach, small bowel, or large bowel loops. No abnormal bowel wall thickening or pathologic bowel wall enhancement. LOWER CHEST: No clinically significant abnormality in the visualized lower chest. LIVER/BILIARY TREE: Unchanged simple cysts. Unchanged scattered suspected hemangiomas GALLBLADDER: No calcified gallstones. No pericholecystic inflammatory change. SPLEEN: Unremarkable. PANCREAS: Unremarkable. ADRENAL GLANDS: Unremarkable. KIDNEYS/URETERS: No hydronephrosis or urinary tract calculi. Subcentimeter hypoattenuating renal lesion(s), too small to characterize but statistically likely benign, which do not warrant follow-up (Radiology June 2019). APPENDIX: No findings to suggest appendicitis. ABDOMINOPELVIC CAVITY: No ascites. No pneumoperitoneum. No lymphadenopathy. VESSELS: Reidentified atherosclerotic changes with infrarenal abdominal aortic ectasia measuring up to 27 mm. PELVIS REPRODUCTIVE ORGANS: Prostamegaly. URINARY BLADDER: Mild wall thickening in keeping with outlet obstruction. ABDOMINAL WALL/INGUINAL REGIONS: Uncomplicated fat-containing umbilical hernia. BONES: No acute fracture or suspicious osseous lesion.     Impression: No CT evidence of active high-volume gastrointestinal hemorrhage. No acute findings in the abdomen or pelvis. Workstation performed: [id number]     VAS carotid  complete study    Result Date: 7/18/2024  Narrative:  [facility] REPORT CLINICAL: Indications: Initial Post-op evaluation s/p revascularization.  Patient is asymptomatic at this time. Operative History: 2024-05-22 Right TCAR Risk Factors The patient has history of HTN and smoking (current) 0.5 ppd.  FINDINGS:  Right              Impression     PSV  EDV (cm/s)  Direction of Flow  Ratio  Dist. ICA                          59          22                      0.81  Mid. ICA                           54          18                      0.74  Prox. ICA - Stent  Widely Patent   54          17                      0.74  Dist CCA                           59          16                            Mid CCA                            72          21                      0.77  Prox CCA                           94          21                            Ext Carotid                        66          10                      0.91  Prox Vert                          42           8  Antegrade                 Subclavian                         57           0                             Left               Impression  PSV  EDV (cm/s)  Direction of Flow  Ratio  Dist. ICA                       57          20                      0.93  Mid. ICA                       192          66                      3.13  Prox. ICA          70 - 99%    329         117                      5.37  Mid CCA                         61          16                      0.86  Prox CCA                        72          18                            Ext Carotid                     96          15                      1.56  Prox Vert                       39          12  Antegrade                 Subclavian                      85           0                               CONCLUSION: Impression RIGHT: Widely patent internal carotid artery and endarterectomy site. Vertebral artery flow is antegrade. There is no significant subclavian artery disease.  LEFT:  There is 70-99% stenosis noted in the internal carotid artery. Plaque is heterogenous and irregular. Vertebral artery flow is antegrade. There is no significant subclavian artery disease.  Compared to previous study on 5/14/2024, there is wide patency of the right ICA s/p TCAR. Recommend repeat testing in 6 months as per protocol unless otherwise indicated.  SIGNATURE: Electronically Signed by: ZAY OCHOA MD on 2024-07-18 04:58:17 PM

## 2024-08-14 ENCOUNTER — TELEPHONE (OUTPATIENT)
Age: 84
End: 2024-08-14

## 2024-08-14 NOTE — TELEPHONE ENCOUNTER
Patients GI provider:  Dr. Matthews    Number to return call: [phone]    Reason for call: Pt's wife Shana (on consent) calling to get him scheduled for an EUS and colonoscopy, she said the Wants these scheduled together at the same time. Can you please reach out to Shana when possible to help get these on the books?

## 2024-08-15 ENCOUNTER — TELEPHONE (OUTPATIENT)
Dept: GASTROENTEROLOGY | Facility: CLINIC | Age: 84
End: 2024-08-15

## 2024-08-15 NOTE — TELEPHONE ENCOUNTER
Referring physician : Cassie    Diagnosis : Gastric mass/Rectal bleeding/colon polyps    Discussed with patient : YES/NO: yes    Symptoms : Weight loss/RB    Imaging : CT AP 7/2024- no acute findings    Prior endoscopy : EGD and colonoscopy done at [facility] 1/[facility]; EGD with antral biopsy and GEJ biopsies. Flexible colonoscopy to cecum with cold forceps polypectomies of 3mm 2x ascending colon polyps, and cold snare polypectomy of 4mm rectosigmoid colon      Plan :   1. EUS and colonoscopy.     Order placed : YES/NO: yes

## 2024-08-20 ENCOUNTER — TELEPHONE (OUTPATIENT)
Dept: GASTROENTEROLOGY | Facility: CLINIC | Age: 84
End: 2024-08-20

## 2024-08-20 NOTE — TELEPHONE ENCOUNTER
Procedure:  EUS/Colon   Scheduled date of procedure (as of today): 9/10/24  Physician performing procedure: Dr. Debbie Osei  Location of procedure: [facility]  Instructions reviewed with patient by:  Maribel -  give to pt at ov on 8/13  Clearances: Plavix - Dr. Barajas

## 2024-08-20 NOTE — TELEPHONE ENCOUNTER
Our mutual patient is scheduled for procedure:  EUS/Colonoscopy    On: 9/10/24      With: Dr. Osei    He/She is taking the following blood thinner: Plavix    Can this be stopped 5 days prior to the procedure?      Physician Approving clearance: ________________________

## 2024-09-09 ENCOUNTER — TELEPHONE (OUTPATIENT)
Age: 84
End: 2024-09-09

## 2024-09-09 RX ORDER — SODIUM CHLORIDE 9 MG/ML
125 INJECTION, SOLUTION INTRAVENOUS CONTINUOUS
Status: CANCELLED | OUTPATIENT
Start: 2024-09-09

## 2024-09-10 ENCOUNTER — HOSPITAL ENCOUNTER (OUTPATIENT)
Dept: GASTROENTEROLOGY | Facility: HOSPITAL | Age: 84
Setting detail: OUTPATIENT SURGERY
Discharge: HOME | End: 2024-09-10
Attending: INTERNAL MEDICINE
Payer: COMMERCIAL

## 2024-09-10 ENCOUNTER — ANESTHESIA EVENT (OUTPATIENT)
Dept: GASTROENTEROLOGY | Facility: HOSPITAL | Age: 84
End: 2024-09-10
Payer: COMMERCIAL

## 2024-09-10 ENCOUNTER — ANESTHESIA (OUTPATIENT)
Dept: GASTROENTEROLOGY | Facility: HOSPITAL | Age: 84
End: 2024-09-10
Payer: COMMERCIAL

## 2024-09-10 VITALS
HEIGHT: 70 IN | DIASTOLIC BLOOD PRESSURE: 61 MMHG | TEMPERATURE: 97.1 F | WEIGHT: 159 LBS | BODY MASS INDEX: 22.76 KG/M2 | HEART RATE: 60 BPM | RESPIRATION RATE: 16 BRPM | SYSTOLIC BLOOD PRESSURE: 128 MMHG | OXYGEN SATURATION: 97 %

## 2024-09-10 DIAGNOSIS — K62.5 RECTAL BLEEDING: ICD-10-CM

## 2024-09-10 DIAGNOSIS — R63.4 UNINTENTIONAL WEIGHT LOSS: ICD-10-CM

## 2024-09-10 DIAGNOSIS — K31.9 GASTRIC LESION: ICD-10-CM

## 2024-09-10 PROCEDURE — 88305 TISSUE EXAM BY PATHOLOGIST: CPT | Performed by: PATHOLOGY

## 2024-09-10 PROCEDURE — C1889 IMPLANT/INSERT DEVICE, NOC: HCPCS

## 2024-09-10 PROCEDURE — 43242 EGD US FINE NEEDLE BX/ASPIR: CPT | Performed by: INTERNAL MEDICINE

## 2024-09-10 PROCEDURE — 45378 DIAGNOSTIC COLONOSCOPY: CPT | Performed by: INTERNAL MEDICINE

## 2024-09-10 RX ORDER — LIDOCAINE HYDROCHLORIDE 20 MG/ML
INJECTION, SOLUTION EPIDURAL; INFILTRATION; INTRACAUDAL; PERINEURAL AS NEEDED
Status: DISCONTINUED | OUTPATIENT
Start: 2024-09-10 | End: 2024-09-10

## 2024-09-10 RX ORDER — PROPOFOL 10 MG/ML
INJECTION, EMULSION INTRAVENOUS CONTINUOUS PRN
Status: DISCONTINUED | OUTPATIENT
Start: 2024-09-10 | End: 2024-09-10

## 2024-09-10 RX ORDER — SODIUM CHLORIDE 9 MG/ML
125 INJECTION, SOLUTION INTRAVENOUS CONTINUOUS
Status: DISCONTINUED | OUTPATIENT
Start: 2024-09-10 | End: 2024-09-14 | Stop reason: HOSPADM

## 2024-09-10 RX ORDER — PROPOFOL 10 MG/ML
INJECTION, EMULSION INTRAVENOUS AS NEEDED
Status: DISCONTINUED | OUTPATIENT
Start: 2024-09-10 | End: 2024-09-10

## 2024-09-10 RX ADMIN — PROPOFOL 100 MG: 10 INJECTION, EMULSION INTRAVENOUS at 08:50

## 2024-09-10 RX ADMIN — PROPOFOL 120 MCG/KG/MIN: 10 INJECTION, EMULSION INTRAVENOUS at 08:50

## 2024-09-10 RX ADMIN — SODIUM CHLORIDE: 0.9 INJECTION, SOLUTION INTRAVENOUS at 09:04

## 2024-09-10 RX ADMIN — PROPOFOL 50 MG: 10 INJECTION, EMULSION INTRAVENOUS at 09:11

## 2024-09-10 RX ADMIN — PROPOFOL 30 MG: 10 INJECTION, EMULSION INTRAVENOUS at 09:02

## 2024-09-10 RX ADMIN — SODIUM CHLORIDE: 0.9 INJECTION, SOLUTION INTRAVENOUS at 09:44

## 2024-09-10 RX ADMIN — SODIUM CHLORIDE 125 ML/HR: 0.9 INJECTION, SOLUTION INTRAVENOUS at 08:09

## 2024-09-10 RX ADMIN — PROPOFOL 40 MG: 10 INJECTION, EMULSION INTRAVENOUS at 09:06

## 2024-09-10 RX ADMIN — LIDOCAINE HYDROCHLORIDE 100 MG: 20 INJECTION, SOLUTION EPIDURAL; INFILTRATION; INTRACAUDAL at 08:48

## 2024-09-10 RX ADMIN — PROPOFOL 30 MG: 10 INJECTION, EMULSION INTRAVENOUS at 09:53

## 2024-09-10 NOTE — PERIOPERATIVE NURSING NOTE
Wife Shana present at bedside, informed staff that the patient is unable to provide any medical information or make medical decisions due to worsening dementia per Dr. Barajas.

## 2024-09-10 NOTE — ANESTHESIA POSTPROCEDURE EVALUATION
Post-Op Assessment Note    CV Status:  Stable    Pain management: adequate       Mental Status:  Alert and awake   Hydration Status:  Euvolemic   PONV Controlled:  Controlled   Airway Patency:  Patent  Two or more mitigation strategies used for obstructive sleep apnea   Post Op Vitals Reviewed: Yes    No anethesia notable event occurred.    Staff: Anesthesiologist               BP      Temp      Pulse     Resp      SpO2

## 2024-09-10 NOTE — ANESTHESIA PREPROCEDURE EVALUATION
Procedure:  COLONOSCOPY  ENDOSCOPIC ULTRASOUND (UPPER)    Relevant Problems   CARDIO   (+) Bilateral carotid artery stenosis   (+) Essential hypertension   (+) PVD (peripheral vascular disease) (HCC)      GI/HEPATIC   (+) Rectal bleeding   (+) Small bowel obstruction (HCC)      /RENAL   (+) BPH (benign prostatic hyperplasia)      HEMATOLOGY   (+) Thrombocytopenia (HCC)      NEURO/PSYCH   (+) Acute ischemic multifocal right-sided anterior circulation stroke (HCC)   (+) Dementia (HCC)      PULMONARY   (+) Shortness of breath   (+) Smoking      Behavioral Health   (+) Cigarette nicotine dependence with nicotine-induced disorder        Physical Exam    Airway    Mallampati score: II  TM Distance: >3 FB  Neck ROM: full     Dental    lower dentures and upper dentures    Cardiovascular  Rhythm: regular, Rate: normal, Cardiovascular exam normal    Pulmonary  Pulmonary exam normal Breath sounds clear to auscultation    Other Findings        Anesthesia Plan  ASA Score- 3     Anesthesia Type- general with ASA Monitors.         Additional Monitors:     Airway Plan:     Comment: Pt's spouse states that he is not competent to sign consent after CVA..       Plan Factors-Exercise tolerance (METS): >4 METS.    Chart reviewed. EKG reviewed. Imaging results reviewed. Existing labs reviewed. Patient summary reviewed.    Patient is a current smoker.  Patient instructed to abstain from smoking on day of procedure. Patient smoked on day of surgery.    Obstructive sleep apnea risk education given perioperatively.        Induction-     Postoperative Plan-         Informed Consent- Anesthetic plan and risks discussed with patient.  I personally reviewed this patient with the CRNA. Discussed and agreed on the Anesthesia Plan with the CRNA..

## 2024-09-10 NOTE — INTERVAL H&P NOTE
H&P reviewed. After examining the patient I find no changes in the patients condition since the H&P had been written.  Proceed with EGD/EUS +/- FNB and colonoscopy for evaluation of gastric (?subepithelial) mass and recent rectal bleeding.    Vitals:    09/10/24 0753   BP: (!) 179/86   Pulse: 69   Resp: 18   Temp: 98.2 °F (36.8 °C)   SpO2: 98%

## 2024-09-11 ENCOUNTER — NURSE TRIAGE (OUTPATIENT)
Age: 84
End: 2024-09-11

## 2024-09-11 NOTE — TELEPHONE ENCOUNTER
"LOV 6/4/24 hx b/l carotid artery stenosis s/p TCAR 5/22, last VAS carotid 7/17/24    Received call from patient's wife who wanted to fill Dr Barajas in on an episode the pt had last week prior to the pt's appt tomorrow 9/12.  While out in public on 9/4 the pt was unable to recognize his wife for about 45 minutes.  The wife noted a slight facial droop but denies any other neurological symptoms like slurred speech or unilateral weakness.  Pt's wife states he was recently dx with dementia by his PCP and has noticed a drastic change in his behavior the last 4-5 months.  Pt has had multiple episode of forgetfulness and then getting agitated with wife after.      Patient has an appt for tomorrow 9/12 and did not want to discuss the episode in front of the pt bc she is afraid he will become agitated.  Sending to Dr Barajas as an FYI      Reason for Disposition   Longstanding confusion (e.g., dementia, stroke) and worsening    Additional Information   Confusion, disorientation, or hallucinations is main symptom    Answer Assessment - Initial Assessment Questions  1. LEVEL OF CONSCIOUSNESS: \"How is he (she, the patient) acting right now?\" (e.g., alert-oriented, confused, lethargic, stuporous, comatose)      Patient wife called in stating that on 9/4 he had an episode of not recognizing her for about 45 min while they were out in public.  2. ONSET: \"When did the confusion start?\"  (minutes, hours, days)      He has been acting differently for the last 4-5 months.  Recently dx with dementia by PCP  3. PATTERN \"Does this come and go, or has it been constant since it started?\"  \"Is it present now?\"      Has episodes of \"not remembering\" and getting agitated with wife  4. ALCOHOL or DRUGS: \"Has he been drinking alcohol or taking any drugs?\"       Denies   5. NARCOTIC MEDICATIONS: \"Has he been receiving any narcotic medications?\" (e.g., morphine, Vicodin)      Denies   6. CAUSE: \"What do you think is causing the confusion?\"      " " Possible TIA/worsening dementia   7. OTHER SYMPTOMS: \"Are there any other symptoms?\" (e.g., difficulty breathing, headache, fever, weakness)      Incontinent of urine, refusing to bathe, sleeping a lot    Protocols used: Neurologic Deficit-ADULT-OH, Confusion - Delirium-ADULT-[place]    "

## 2024-09-11 NOTE — TELEPHONE ENCOUNTER
Contacted patient's wife Shana to inquire about facial droop.  She stated that she does not remember what side it is on.  She denies any neurological or stroke like symptoms at this time.  Reviewed stroke symptoms with her that need immediate attention and evaluation in ER.  Confirmed appointment with her as well.

## 2024-09-11 NOTE — TELEPHONE ENCOUNTER
-Noted patient's wife contacted by BRET Banks RN  -Unable to reach patient wife myself on the phone to discuss  -Symptoms were 1 week ago and he already has appointment tomorrow with Dr. Kendrick.

## 2024-09-12 ENCOUNTER — OFFICE VISIT (OUTPATIENT)
Dept: VASCULAR SURGERY | Facility: CLINIC | Age: 84
End: 2024-09-12
Payer: COMMERCIAL

## 2024-09-12 VITALS
WEIGHT: 161 LBS | HEIGHT: 70 IN | HEART RATE: 79 BPM | OXYGEN SATURATION: 97 % | DIASTOLIC BLOOD PRESSURE: 90 MMHG | SYSTOLIC BLOOD PRESSURE: 164 MMHG | BODY MASS INDEX: 23.05 KG/M2

## 2024-09-12 DIAGNOSIS — I65.23 BILATERAL CAROTID ARTERY STENOSIS: Primary | ICD-10-CM

## 2024-09-12 DIAGNOSIS — I63.9 STROKE (HCC): ICD-10-CM

## 2024-09-12 DIAGNOSIS — F17.200 SMOKING: ICD-10-CM

## 2024-09-12 PROCEDURE — 99214 OFFICE O/P EST MOD 30 MIN: CPT | Performed by: SURGERY

## 2024-09-12 PROCEDURE — 88305 TISSUE EXAM BY PATHOLOGIST: CPT | Performed by: PATHOLOGY

## 2024-09-12 RX ORDER — ATORVASTATIN CALCIUM 40 MG/1
40 TABLET, FILM COATED ORAL EVERY EVENING
Qty: 30 TABLET | Refills: 3 | Status: SHIPPED | OUTPATIENT
Start: 2024-09-12

## 2024-09-12 RX ORDER — CLOPIDOGREL BISULFATE 75 MG/1
75 TABLET ORAL DAILY
Qty: 90 TABLET | Refills: 2 | Status: SHIPPED | OUTPATIENT
Start: 2024-09-12 | End: 2025-03-11

## 2024-09-12 RX ORDER — PROPRANOLOL HYDROCHLORIDE 20 MG/1
TABLET ORAL
COMMUNITY
Start: 2024-09-06

## 2024-09-12 NOTE — PROGRESS NOTES
Ambulatory Visit  Name: Joe Aviles      : 1940      MRN: [id number]  Encounter Provider: Sven Barajas DO  Encounter Date: 2024   Encounter department: [facility]    Assessment & Plan  Bilateral carotid artery stenosis  Hx R TCAR for symptomatic disease/right sided strokes back in May.    Patient with known asymptomatic left carotid stenosis.    Patient presents to the office today accompanied by his wife who is very concerned following a recent episode where he did not recognize her for approximately 45 minutes.  Patient is totally appropriate today in the office.  He denies any focal neurologic deficits.  Patient does have history of dementia and is on Namenda.  In my clinical judgment his symptoms unfortunately represent more so a progression of his underlying dementia.  I have expressed to both Joe and his wife that they should follow-up with their PCP for possible consideration of a formal neurocognitive evaluation if 1 has not been done so.  He does have an asymptomatic high-grade carotid stenosis.  We discussed the pros and cons of proceeding with carotid surgical intervention to include potential worsening of his underlying cognitive faculties secondary to anesthesia.  He is on aspirin, Plavix and atorvastatin.   We discussed the importance of compliance with his medical regiment.  He continues to smoke.  He has no intentions of quitting.  At this time patient has elected to continue treatment of his carotid disease with optimal medical therapy.  Should he change his mind regarding surgical intervention for asymptomatic left carotid stenosis he will call the office to notify us.  Should he wish to proceed he will require a CTA of the head and neck with return office visit to discuss potential left TCAR.    Orders:    VAS carotid complete study; Future    Smoking         Stroke (HCC)    Orders:    clopidogrel (PLAVIX) 75 mg tablet; Take 1 tablet (75 mg total) by  mouth daily    atorvastatin (LIPITOR) 40 mg tablet; Take 1 tablet (40 mg total) by mouth every evening      History of Present Illness     Patient is here today to review results of a CV done 7/17/2024. Patient is s/p right TCAR done 5/22/2024. Patient c/o frequent dizziness. The denies any other TIA or Stroke symptoms. Patient is taking ASA 81 mg, Plavix and Atorvastatin. Patient is a current smoker.         Joe Aviles is a 83 y.o. male who presents to the office accompanied by his wife to discuss his carotid disease as well as a recent episode reported by his wife where patient did not recognize her for approximately 45 minutes.  She reports that there are additional episodes where he has forgotten things.  Patient appears totally appropriate today in the office.  He is alert, oriented and coherent.  He does have some residual left-sided weakness from his prior right-sided stroke for which he underwent a right TCAR back in May.    He denies any focal right-sided symptoms/amaurosis fugax.    History obtained from : patient  Review of Systems   Constitutional: Negative.    HENT: Negative.     Eyes: Negative.    Respiratory: Negative.     Cardiovascular: Negative.    Gastrointestinal: Negative.    Endocrine: Negative.    Genitourinary: Negative.    Musculoskeletal: Negative.    Skin: Negative.    Allergic/Immunologic: Negative.    Neurological:  Positive for dizziness.   Hematological: Negative.    Psychiatric/Behavioral:  Positive for confusion.      Past Medical History   Past Medical History:   Diagnosis Date    BPH (benign prostatic hyperplasia)     Colon polyp     Dementia (HCC)     Diverticulitis     Hypertension     Macular degeneration     Stroke (HCC)      Past Surgical History:   Procedure Laterality Date    COLONOSCOPY      CORONARY ANGIOPLASTY WITH STENT PLACEMENT      CT CYSTOGRAM  1/14/2022    CYSTOSCOPY      IA TCAT IV STENT CRV CRTD ART EMBOLIC PROTECJ Right 05/22/2024    Procedure: ANGIOPLASTY  ARTERY CAROTID W/ STENT;  Surgeon: Sven Barajas DO;  Location: [facility];  Service: Vascular    PROSTATE SURGERY      TONSILLECTOMY      VASECTOMY       Family History   Problem Relation Age of Onset    Brain cancer Mother     Cancer Brother         Bladder    Brain cancer Child      Current Outpatient Medications on File Prior to Visit   Medication Sig Dispense Refill    acetaminophen (TYLENOL) 325 mg tablet Take 2 tablets (650 mg total) by mouth every 6 (six) hours as needed for mild pain      amLODIPine (NORVASC) 5 mg tablet Take 5 mg by mouth daily      aspirin 81 mg chewable tablet Chew 1 tablet (81 mg total) daily 30 tablet 0    memantine (NAMENDA) 10 mg tablet Take 10 mg by mouth daily      Multiple Vitamins-Minerals (OCUVITE PO) Take 1 tablet by mouth daily      propranolol (INDERAL) 20 mg tablet       [DISCONTINUED] atorvastatin (LIPITOR) 40 mg tablet Take 1 tablet (40 mg total) by mouth every evening 30 tablet 0    [DISCONTINUED] clopidogrel (PLAVIX) 75 mg tablet Take 1 tablet (75 mg total) by mouth daily 90 tablet 1    [DISCONTINUED] propranolol (INDERAL) 10 mg tablet Take 10 mg by mouth 3 (three) times a day      bisacodyl (DULCOLAX) 5 mg EC tablet Take 2 tablets (10 mg total) by mouth once for 1 dose 2 tablet 0    Multiple Vitamin (MULTIVITAMIN) capsule Take 1 capsule by mouth daily (Patient not taking: Reported on 8/13/2024)      nicotine (NICODERM CQ) 7 mg/24hr TD 24 hr patch Place 1 patch on the skin over 24 hours daily (Patient not taking: Reported on 8/13/2024) 28 patch 0    tamsulosin (FLOMAX) 0.4 mg Take 0.4 mg by mouth daily with dinner (Patient not taking: Reported on 8/13/2024)       Current Facility-Administered Medications on File Prior to Visit   Medication Dose Route Frequency Provider Last Rate Last Admin    sodium chloride 0.9 % infusion  125 mL/hr Intravenous Continuous Rudolph Devine DO   Stopped at 09/10/24 1053   No Known Allergies   Current Outpatient Medications on File  Prior to Visit   Medication Sig Dispense Refill    acetaminophen (TYLENOL) 325 mg tablet Take 2 tablets (650 mg total) by mouth every 6 (six) hours as needed for mild pain      amLODIPine (NORVASC) 5 mg tablet Take 5 mg by mouth daily      aspirin 81 mg chewable tablet Chew 1 tablet (81 mg total) daily 30 tablet 0    memantine (NAMENDA) 10 mg tablet Take 10 mg by mouth daily      Multiple Vitamins-Minerals (OCUVITE PO) Take 1 tablet by mouth daily      propranolol (INDERAL) 20 mg tablet       [DISCONTINUED] atorvastatin (LIPITOR) 40 mg tablet Take 1 tablet (40 mg total) by mouth every evening 30 tablet 0    [DISCONTINUED] clopidogrel (PLAVIX) 75 mg tablet Take 1 tablet (75 mg total) by mouth daily 90 tablet 1    [DISCONTINUED] propranolol (INDERAL) 10 mg tablet Take 10 mg by mouth 3 (three) times a day      bisacodyl (DULCOLAX) 5 mg EC tablet Take 2 tablets (10 mg total) by mouth once for 1 dose 2 tablet 0    Multiple Vitamin (MULTIVITAMIN) capsule Take 1 capsule by mouth daily (Patient not taking: Reported on 8/13/2024)      nicotine (NICODERM CQ) 7 mg/24hr TD 24 hr patch Place 1 patch on the skin over 24 hours daily (Patient not taking: Reported on 8/13/2024) 28 patch 0    tamsulosin (FLOMAX) 0.4 mg Take 0.4 mg by mouth daily with dinner (Patient not taking: Reported on 8/13/2024)       Current Facility-Administered Medications on File Prior to Visit   Medication Dose Route Frequency Provider Last Rate Last Admin    sodium chloride 0.9 % infusion  125 mL/hr Intravenous Continuous Rudolph Devine DO   Stopped at 09/10/24 1053      Social History     Tobacco Use    Smoking status: Every Day     Current packs/day: 0.50     Average packs/day: 0.5 packs/day for 67.7 years (33.8 ttl pk-yrs)     Types: Cigarettes     Start date: 1957    Smokeless tobacco: Never   Vaping Use    Vaping status: Never Used   Substance and Sexual Activity    Alcohol use: Not Currently    Drug use: No    Sexual activity: Not on file  "        Objective     /90 (BP Location: Left arm, Patient Position: Sitting, Cuff Size: Standard)   Pulse 79   Ht 5' 10\" (1.778 m)   Wt 73 kg (161 lb)   SpO2 97%   BMI 23.10 kg/m²     Physical Exam  Constitutional:       General: He is not in acute distress.     Appearance: He is well-developed.   HENT:      Head: Normocephalic and atraumatic.   Eyes:      General: No scleral icterus.     Conjunctiva/sclera: Conjunctivae normal.   Neck:      Trachea: No tracheal deviation.   Cardiovascular:      Rate and Rhythm: Normal rate and regular rhythm.   Pulmonary:      Effort: Pulmonary effort is normal.   Abdominal:      General: There is no distension.      Palpations: Abdomen is soft. There is no mass (no appreciable aortic pulsation/aneurysm).      Tenderness: There is no abdominal tenderness. There is no guarding or rebound.   Musculoskeletal:         General: Normal range of motion.      Cervical back: Normal range of motion and neck supple.   Skin:     General: Skin is warm and dry.   Neurological:      Mental Status: He is alert and oriented to person, place, and time.      Motor: Weakness present.   Psychiatric:         Mood and Affect: Mood normal.         Behavior: Behavior normal.         Thought Content: Thought content normal.         Judgment: Judgment normal.       Administrative Statements   I have spent a total time of 30 minutes in caring for this patient on the day of the visit/encounter including Diagnostic results, Prognosis, Risks and benefits of tx options, Instructions for management, Patient and family education, Importance of tx compliance, Risk factor reductions, Impressions, Counseling / Coordination of care, Documenting in the medical record, Reviewing / ordering tests, medicine, procedures  , and Obtaining or reviewing history  .  "

## 2024-09-12 NOTE — ASSESSMENT & PLAN NOTE
Hx R TCAR for symptomatic disease/right sided strokes back in May.    Patient with known asymptomatic left carotid stenosis.    Patient presents to the office today accompanied by his wife who is very concerned following a recent episode where he did not recognize her for approximately 45 minutes.  Patient is totally appropriate today in the office.  He denies any focal neurologic deficits.  Patient does have history of dementia and is on Namenda.  In my clinical judgment his symptoms unfortunately represent more so a progression of his underlying dementia.  I have expressed to both Joe and his wife that they should follow-up with their PCP for possible consideration of a formal neurocognitive evaluation if 1 has not been done so.  He does have an asymptomatic high-grade carotid stenosis.  We discussed the pros and cons of proceeding with carotid surgical intervention to include potential worsening of his underlying cognitive faculties secondary to anesthesia.  He is on aspirin, Plavix and atorvastatin.   We discussed the importance of compliance with his medical regiment.  He continues to smoke.  He has no intentions of quitting.  At this time patient has elected to continue treatment of his carotid disease with optimal medical therapy.  Should he change his mind regarding surgical intervention for asymptomatic left carotid stenosis he will call the office to notify us.  Should he wish to proceed he will require a CTA of the head and neck with return office visit to discuss potential left TCAR.    Orders:    VAS carotid complete study; Future

## 2024-09-30 ENCOUNTER — HOSPITAL ENCOUNTER (EMERGENCY)
Facility: HOSPITAL | Age: 84
Discharge: HOME | End: 2024-09-30
Attending: EMERGENCY MEDICINE
Payer: COMMERCIAL

## 2024-09-30 VITALS
TEMPERATURE: 98.1 F | DIASTOLIC BLOOD PRESSURE: 103 MMHG | OXYGEN SATURATION: 98 % | WEIGHT: 162 LBS | SYSTOLIC BLOOD PRESSURE: 158 MMHG | RESPIRATION RATE: 18 BRPM | BODY MASS INDEX: 23.24 KG/M2 | HEART RATE: 62 BPM

## 2024-09-30 DIAGNOSIS — R55 NEAR SYNCOPE: Primary | ICD-10-CM

## 2024-09-30 LAB
ALBUMIN SERPL BCG-MCNC: 4.4 G/DL (ref 3.5–5)
ALP SERPL-CCNC: 113 U/L (ref 34–104)
ALT SERPL W P-5'-P-CCNC: 18 U/L (ref 7–52)
ANION GAP SERPL CALCULATED.3IONS-SCNC: 7 MMOL/L (ref 4–13)
AST SERPL W P-5'-P-CCNC: 17 U/L (ref 13–39)
ATRIAL RATE: 61 BPM
BACTERIA UR QL AUTO: NORMAL /HPF
BASOPHILS # BLD AUTO: 0.05 THOUSANDS/ÂΜL (ref 0–0.1)
BASOPHILS NFR BLD AUTO: 1 % (ref 0–1)
BILIRUB SERPL-MCNC: 0.85 MG/DL (ref 0.2–1)
BILIRUB UR QL STRIP: NEGATIVE
BUN SERPL-MCNC: 11 MG/DL (ref 5–25)
CALCIUM SERPL-MCNC: 9.3 MG/DL (ref 8.4–10.2)
CARDIAC TROPONIN I PNL SERPL HS: 4 NG/L
CHLORIDE SERPL-SCNC: 107 MMOL/L (ref 96–108)
CLARITY UR: CLEAR
CO2 SERPL-SCNC: 25 MMOL/L (ref 21–32)
COLOR UR: YELLOW
CREAT SERPL-MCNC: 1.04 MG/DL (ref 0.6–1.3)
EOSINOPHIL # BLD AUTO: 0.19 THOUSAND/ÂΜL (ref 0–0.61)
EOSINOPHIL NFR BLD AUTO: 2 % (ref 0–6)
ERYTHROCYTE [DISTWIDTH] IN BLOOD BY AUTOMATED COUNT: 13.1 % (ref 11.6–15.1)
FLUAV RNA RESP QL NAA+PROBE: NEGATIVE
FLUBV RNA RESP QL NAA+PROBE: NEGATIVE
GFR SERPL CREATININE-BSD FRML MDRD: 66 ML/MIN/1.73SQ M
GLUCOSE SERPL-MCNC: 135 MG/DL (ref 65–140)
GLUCOSE SERPL-MCNC: 151 MG/DL (ref 65–140)
GLUCOSE UR STRIP-MCNC: NEGATIVE MG/DL
HCT VFR BLD AUTO: 45.5 % (ref 36.5–49.3)
HGB BLD-MCNC: 15.1 G/DL (ref 12–17)
HGB UR QL STRIP.AUTO: ABNORMAL
IMM GRANULOCYTES # BLD AUTO: 0.04 THOUSAND/UL (ref 0–0.2)
IMM GRANULOCYTES NFR BLD AUTO: 0 % (ref 0–2)
KETONES UR STRIP-MCNC: NEGATIVE MG/DL
LEUKOCYTE ESTERASE UR QL STRIP: NEGATIVE
LYMPHOCYTES # BLD AUTO: 1.45 THOUSANDS/ÂΜL (ref 0.6–4.47)
LYMPHOCYTES NFR BLD AUTO: 13 % (ref 14–44)
MCH RBC QN AUTO: 30.8 PG (ref 26.8–34.3)
MCHC RBC AUTO-ENTMCNC: 33.2 G/DL (ref 31.4–37.4)
MCV RBC AUTO: 93 FL (ref 82–98)
MONOCYTES # BLD AUTO: 0.57 THOUSAND/ÂΜL (ref 0.17–1.22)
MONOCYTES NFR BLD AUTO: 5 % (ref 4–12)
NEUTROPHILS # BLD AUTO: 8.74 THOUSANDS/ÂΜL (ref 1.85–7.62)
NEUTS SEG NFR BLD AUTO: 79 % (ref 43–75)
NITRITE UR QL STRIP: NEGATIVE
NON-SQ EPI CELLS URNS QL MICRO: NORMAL /HPF
NRBC BLD AUTO-RTO: 0 /100 WBCS
P AXIS: 58 DEGREES
PH UR STRIP.AUTO: 7 [PH]
PLATELET # BLD AUTO: 145 THOUSANDS/UL (ref 149–390)
PMV BLD AUTO: 11.6 FL (ref 8.9–12.7)
POTASSIUM SERPL-SCNC: 3.8 MMOL/L (ref 3.5–5.3)
PR INTERVAL: 138 MS
PROT SERPL-MCNC: 7.3 G/DL (ref 6.4–8.4)
PROT UR STRIP-MCNC: NEGATIVE MG/DL
QRS AXIS: -40 DEGREES
QRSD INTERVAL: 110 MS
QT INTERVAL: 412 MS
QTC INTERVAL: 414 MS
RBC # BLD AUTO: 4.9 MILLION/UL (ref 3.88–5.62)
RBC #/AREA URNS AUTO: NORMAL /HPF
RSV RNA RESP QL NAA+PROBE: NEGATIVE
SARS-COV-2 RNA RESP QL NAA+PROBE: NEGATIVE
SODIUM SERPL-SCNC: 139 MMOL/L (ref 135–147)
SP GR UR STRIP.AUTO: 1.01 (ref 1–1.03)
T WAVE AXIS: 29 DEGREES
UROBILINOGEN UR QL STRIP.AUTO: 0.2 E.U./DL
VENTRICULAR RATE: 61 BPM
WBC # BLD AUTO: 11.04 THOUSAND/UL (ref 4.31–10.16)
WBC #/AREA URNS AUTO: NORMAL /HPF

## 2024-09-30 PROCEDURE — 85025 COMPLETE CBC W/AUTO DIFF WBC: CPT | Performed by: EMERGENCY MEDICINE

## 2024-09-30 PROCEDURE — 99285 EMERGENCY DEPT VISIT HI MDM: CPT | Performed by: EMERGENCY MEDICINE

## 2024-09-30 PROCEDURE — 84484 ASSAY OF TROPONIN QUANT: CPT | Performed by: EMERGENCY MEDICINE

## 2024-09-30 PROCEDURE — 80053 COMPREHEN METABOLIC PANEL: CPT | Performed by: EMERGENCY MEDICINE

## 2024-09-30 PROCEDURE — 99284 EMERGENCY DEPT VISIT MOD MDM: CPT

## 2024-09-30 PROCEDURE — 0241U HB NFCT DS VIR RESP RNA 4 TRGT: CPT | Performed by: EMERGENCY MEDICINE

## 2024-09-30 PROCEDURE — 81001 URINALYSIS AUTO W/SCOPE: CPT | Performed by: EMERGENCY MEDICINE

## 2024-09-30 PROCEDURE — 93005 ELECTROCARDIOGRAM TRACING: CPT

## 2024-09-30 PROCEDURE — 36415 COLL VENOUS BLD VENIPUNCTURE: CPT | Performed by: EMERGENCY MEDICINE

## 2024-09-30 PROCEDURE — 82948 REAGENT STRIP/BLOOD GLUCOSE: CPT

## 2024-09-30 NOTE — ED PROVIDER NOTES
Final diagnoses:   Near syncope     ED Disposition       ED Disposition   Discharge    Condition   Stable    Date/Time   Mon Sep 30, 2024  1:35 PM    Comment   Joe Aviles discharge to home/self care.                   Assessment & Plan       Medical Decision Making  1243: Patient appears well, vital signs reviewed.  Patient describes a near syncopal event prior to arrival, symptoms have resolved.  NIHSS 0, no focal neurological deficit.  Patient is asymptomatic at this time.  Ambulates without difficulty.  Plan to complete basic labs including cardiac enzymes.  Placed on the monitor.  Plan to observe for any dysrhythmia.    1345: Labs reviewed.  The patient has remained asymptomatic throughout ED course.  Stable for discharge.    Amount and/or Complexity of Data Reviewed  Labs: ordered.  Radiology: ordered and independent interpretation performed.  ECG/medicine tests: ordered and independent interpretation performed.     Details: Normal sinus rhythm 61 bpm, left axis deviation, no acute ischemia.             Medications - No data to display    ED Risk Strat Scores       Stroke Assessment       Row Name 09/30/24 1243             NIH Stroke Scale    Interval Baseline      Level of Consciousness (1a.) 0      LOC Questions (1b.) 0      LOC Commands (1c.) 0      Best Gaze (2.) 0      Visual (3.) 0      Facial Palsy (4.) 0      Motor Arm, Left (5a.) 0      Motor Arm, Right (5b.) 0      Motor Leg, Left (6a.) 0      Motor Leg, Right (6b.) 0      Limb Ataxia (7.) 0      Sensory (8.) 0      Best Language (9.) 0      Dysarthria (10.) 0      Extinction and Inattention (11.) (Formerly Neglect) 0      Total 0                    Flowsheet Row Most Recent Value   Thrombolytic Decision Options    Thrombolytic Decision Patient not a candidate.   Patient is not a candidate options Symptoms resolved/clearly non disabling.                                                History of Present Illness       Chief Complaint   Patient  "presents with    Dizziness     Pt states dizziness, lightheadedness, nausea this morning around 1130 and feels \"weird\".         Past Medical History:   Diagnosis Date    BPH (benign prostatic hyperplasia)     Colon polyp     Dementia (HCC)     Diverticulitis     Hypertension     Macular degeneration     Stroke (HCC)       Past Surgical History:   Procedure Laterality Date    COLONOSCOPY      CORONARY ANGIOPLASTY WITH STENT PLACEMENT      CT CYSTOGRAM  1/14/2022    CYSTOSCOPY      MT TCAT IV STENT CRV CRTD ART EMBOLIC PROTECJ Right 05/22/2024    Procedure: ANGIOPLASTY ARTERY CAROTID W/ STENT;  Surgeon: Sven Barajas DO;  Location: [facility];  Service: Vascular    PROSTATE SURGERY      TONSILLECTOMY      VASECTOMY        Family History   Problem Relation Age of Onset    Brain cancer Mother     Cancer Brother         Bladder    Brain cancer Child       Social History     Tobacco Use    Smoking status: Every Day     Current packs/day: 0.50     Average packs/day: 0.5 packs/day for 67.7 years (33.9 ttl pk-yrs)     Types: Cigarettes     Start date: 1957    Smokeless tobacco: Never   Vaping Use    Vaping status: Never Used   Substance Use Topics    Alcohol use: Not Currently    Drug use: No      E-Cigarette/Vaping    E-Cigarette Use Never User       E-Cigarette/Vaping Substances    Nicotine No     THC No     CBD No     Flavoring No       I have reviewed and agree with the history as documented.       History provided by:  Medical records and patient  Dizziness  Quality:  Lightheadedness  Severity:  Moderate  Onset quality:  Sudden  Duration:  2 hours  Timing:  Constant  Progression:  Resolved  Chronicity:  New  Context comment:  Patient states he was sitting down playing on his fire tablet, at 11 AM he developed sudden onset of lightheadedness, nausea, laid down and symptoms slowly resolved  Relieved by:  Nothing  Worsened by:  Nothing  Ineffective treatments:  None tried  Associated symptoms: headaches and nausea  "   Associated symptoms: no chest pain, no palpitations, no shortness of breath, no vomiting and no weakness    Risk factors: hx of stroke        Review of Systems   Constitutional:  Negative for appetite change, chills, fatigue and fever.   HENT:  Negative for ear pain, rhinorrhea, sore throat and trouble swallowing.    Eyes:  Negative for pain, discharge and visual disturbance.   Respiratory:  Negative for cough, chest tightness and shortness of breath.    Cardiovascular:  Negative for chest pain and palpitations.   Gastrointestinal:  Positive for nausea. Negative for abdominal pain and vomiting.   Endocrine: Negative for polydipsia, polyphagia and polyuria.   Genitourinary:  Negative for difficulty urinating, dysuria, hematuria and testicular pain.   Musculoskeletal:  Negative for arthralgias and back pain.   Skin:  Negative for color change and rash.   Allergic/Immunologic: Negative for immunocompromised state.   Neurological:  Positive for light-headedness and headaches. Negative for dizziness, seizures, syncope and weakness.        States he had a frontal headache when this occurred however headache has resolved   Hematological:  Negative for adenopathy.   Psychiatric/Behavioral:  Negative for confusion and dysphoric mood.    All other systems reviewed and are negative.          Objective       ED Triage Vitals [09/30/24 1234]   Temperature Pulse Blood Pressure Respirations SpO2 Patient Position - Orthostatic VS   98.1 °F (36.7 °C) 62 (!) 158/103 18 98 % Sitting      Temp Source Heart Rate Source BP Location FiO2 (%) Pain Score    Temporal Monitor Left arm -- No Pain      Vitals      Date and Time Temp Pulse SpO2 Resp BP Pain Score FACES Pain Rating User   09/30/24 1234 98.1 °F (36.7 °C) 62 98 % 18 158/103 No Pain -- AC            Physical Exam  Vitals and nursing note reviewed.   Constitutional:       General: He is not in acute distress.     Appearance: Normal appearance. He is not ill-appearing,  toxic-appearing or diaphoretic.   HENT:      Head: Normocephalic and atraumatic.      Right Ear: External ear normal.      Left Ear: External ear normal.      Nose: Nose normal. No congestion or rhinorrhea.      Mouth/Throat:      Mouth: Mucous membranes are moist.      Pharynx: Oropharynx is clear. No oropharyngeal exudate or posterior oropharyngeal erythema.   Eyes:      General:         Right eye: No discharge.         Left eye: No discharge.      Extraocular Movements: Extraocular movements intact.      Conjunctiva/sclera: Conjunctivae normal.      Pupils: Pupils are equal, round, and reactive to light.   Cardiovascular:      Rate and Rhythm: Normal rate and regular rhythm.      Pulses: Normal pulses.      Heart sounds: Normal heart sounds. No murmur heard.     No gallop.   Pulmonary:      Effort: Pulmonary effort is normal. No respiratory distress.      Breath sounds: Normal breath sounds. No stridor. No wheezing, rhonchi or rales.   Chest:      Chest wall: No tenderness.   Abdominal:      General: Bowel sounds are normal. There is no distension.      Palpations: Abdomen is soft. There is no mass.      Tenderness: There is no abdominal tenderness. There is no right CVA tenderness, left CVA tenderness, guarding or rebound.      Hernia: No hernia is present.   Musculoskeletal:         General: Normal range of motion.      Cervical back: Normal range of motion and neck supple.   Skin:     General: Skin is warm and dry.      Capillary Refill: Capillary refill takes less than 2 seconds.   Neurological:      General: No focal deficit present.      Mental Status: He is alert and oriented to person, place, and time.      Cranial Nerves: No cranial nerve deficit.      Sensory: No sensory deficit.      Motor: No weakness.      Coordination: Coordination normal.      Gait: Gait normal.      Deep Tendon Reflexes: Reflexes normal.   Psychiatric:         Mood and Affect: Mood normal.         Behavior: Behavior normal.          Thought Content: Thought content normal.         Judgment: Judgment normal.         Results Reviewed       Procedure Component Value Units Date/Time    Urine Microscopic [[id number]]  (Normal) Collected: 09/30/24 1344    Lab Status: Final result Specimen: Urine, Clean Catch Updated: 09/30/24 1404     RBC, UA None Seen /hpf      WBC, UA 0-1 /hpf      Epithelial Cells Occasional /hpf      Bacteria, UA Occasional /hpf     UA w Reflex to Microscopic w Reflex to Culture [[id number]]  (Abnormal) Collected: 09/30/24 1344    Lab Status: Final result Specimen: Urine, Clean Catch Updated: 09/30/24 1354     Color, UA Yellow     Clarity, UA Clear     Specific Gravity, UA 1.015     pH, UA 7.0     Leukocytes, UA Negative     Nitrite, UA Negative     Protein, UA Negative mg/dl      Glucose, UA Negative mg/dl      Ketones, UA Negative mg/dl      Urobilinogen, UA 0.2 E.U./dl      Bilirubin, UA Negative     Occult Blood, UA Trace-lysed    FLU/RSV/COVID - if FLU/RSV clinically relevant (2hr TAT) [[id number]]  (Normal) Collected: 09/30/24 1242    Lab Status: Final result Specimen: Nares from Nose Updated: 09/30/24 1322     SARS-CoV-2 Negative     INFLUENZA A PCR Negative     INFLUENZA B PCR Negative     RSV PCR Negative    Narrative:      This test has been performed using the CoV-2/Flu/RSV plus assay on the [employer] GeneXpert platform. This test has been validated by the  and verified by the performing laboratory.     This test is designed to amplify and detect the following: nucleocapsid (N), envelope (E), and RNA-dependent RNA polymerase (RdRP) genes of the SARS-CoV-2 genome; matrix (M), basic polymerase (PB2), and acidic protein (PA) segments of the influenza A genome; matrix (M) and non-structural protein (NS) segments of the influenza B genome, and the nucleocapsid genes of RSV A and RSV B.     Positive results are indicative of the presence of Flu A, Flu B, RSV, and/or SARS-CoV-2 RNA. Positive results for SARS-CoV-2 or  suspected novel influenza should be reported to state, local, or federal health departments according to local reporting requirements.      All results should be assessed in conjunction with clinical presentation and other laboratory markers for clinical management.     FOR PEDIATRIC PATIENTS - copy/paste COVID Guidelines URL to browser: [email or web address]       HS Troponin 0hr (reflex protocol) [[id number]]  (Normal) Collected: 09/30/24 1237    Lab Status: Final result Specimen: Blood from Arm, Left Updated: 09/30/24 1310     hs TnI 0hr 4 ng/L     Comprehensive metabolic panel [[id number]]  (Abnormal) Collected: 09/30/24 1237    Lab Status: Final result Specimen: Blood from Arm, Left Updated: 09/30/24 1304     Sodium 139 mmol/L      Potassium 3.8 mmol/L      Chloride 107 mmol/L      CO2 25 mmol/L      ANION GAP 7 mmol/L      BUN 11 mg/dL      Creatinine 1.04 mg/dL      Glucose 135 mg/dL      Calcium 9.3 mg/dL      AST 17 U/L      ALT 18 U/L      Alkaline Phosphatase 113 U/L      Total Protein 7.3 g/dL      Albumin 4.4 g/dL      Total Bilirubin 0.85 mg/dL      eGFR 66 ml/min/1.73sq m     Narrative:      [employer] Kid[employer] guidelines for Chronic Kidney Disease (CKD):     Stage 1 with normal or high GFR (GFR > 90 mL/min/1.73 square meters)    Stage 2 Mild CKD (GFR = 60-89 mL/min/1.73 square meters)    Stage 3A Moderate CKD (GFR = 45-59 mL/min/1.73 square meters)    Stage 3B Moderate CKD (GFR = 30-44 mL/min/1.73 square meters)    Stage 4 Severe CKD (GFR = 15-29 mL/min/1.73 square meters)    Stage 5 End Stage CKD (GFR <15 mL/min/1.73 square meters)  Note: GFR calculation is accurate only with a steady state creatinine    CBC and differential [[id number]]  (Abnormal) Collected: 09/30/24 1237    Lab Status: Final result Specimen: Blood from Arm, Left Updated: 09/30/24 1244     WBC 11.04 Thousand/uL      RBC 4.90 Million/uL      Hemoglobin 15.1 g/dL       Hematocrit 45.5 %      MCV 93 fL      MCH 30.8 pg      MCHC 33.2 g/dL      RDW 13.1 %      MPV 11.6 fL      Platelets 145 Thousands/uL      nRBC 0 /100 WBCs      Segmented % 79 %      Immature Grans % 0 %      Lymphocytes % 13 %      Monocytes % 5 %      Eosinophils Relative 2 %      Basophils Relative 1 %      Absolute Neutrophils 8.74 Thousands/µL      Absolute Immature Grans 0.04 Thousand/uL      Absolute Lymphocytes 1.45 Thousands/µL      Absolute Monocytes 0.57 Thousand/µL      Eosinophils Absolute 0.19 Thousand/µL      Basophils Absolute 0.05 Thousands/µL     Fingerstick Glucose (POCT) [[id number]]  (Abnormal) Collected: 09/30/24 1238    Lab Status: Final result Specimen: Blood Updated: 09/30/24 1239     POC Glucose 151 mg/dl             No orders to display       Procedures    ED Medication and Procedure Management   Prior to Admission Medications   Prescriptions Last Dose Informant Patient Reported? Taking?   Multiple Vitamin (MULTIVITAMIN) capsule  Spouse/Significant Other Yes No   Sig: Take 1 capsule by mouth daily   Patient not taking: Reported on 8/13/2024   Multiple Vitamins-Minerals (OCUVITE PO)  Spouse/Significant Other Yes No   Sig: Take 1 tablet by mouth daily   acetaminophen (TYLENOL) 325 mg tablet  Spouse/Significant Other No No   Sig: Take 2 tablets (650 mg total) by mouth every 6 (six) hours as needed for mild pain   amLODIPine (NORVASC) 5 mg tablet  Spouse/Significant Other Yes No   Sig: Take 5 mg by mouth daily   aspirin 81 mg chewable tablet  Spouse/Significant Other No No   Sig: Chew 1 tablet (81 mg total) daily   atorvastatin (LIPITOR) 40 mg tablet   No No   Sig: Take 1 tablet (40 mg total) by mouth every evening   bisacodyl (DULCOLAX) 5 mg EC tablet   No No   Sig: Take 2 tablets (10 mg total) by mouth once for 1 dose   clopidogrel (PLAVIX) 75 mg tablet   No No   Sig: Take 1 tablet (75 mg total) by mouth daily   memantine (NAMENDA) 10 mg tablet  Spouse/Significant Other Yes No   Sig: Take 10  mg by mouth daily   nicotine (NICODERM CQ) 7 mg/24hr TD 24 hr patch  Spouse/Significant Other No No   Sig: Place 1 patch on the skin over 24 hours daily   Patient not taking: Reported on 8/13/2024   propranolol (INDERAL) 20 mg tablet  Spouse/Significant Other Yes No   tamsulosin (FLOMAX) 0.4 mg  Spouse/Significant Other Yes No   Sig: Take 0.4 mg by mouth daily with dinner   Patient not taking: Reported on 8/13/2024      Facility-Administered Medications: None     Discharge Medication List as of 9/30/2024  1:35 PM        CONTINUE these medications which have NOT CHANGED    Details   acetaminophen (TYLENOL) 325 mg tablet Take 2 tablets (650 mg total) by mouth every 6 (six) hours as needed for mild pain, Starting Thu 5/23/2024, OTC      amLODIPine (NORVASC) 5 mg tablet Take 5 mg by mouth daily, Starting Mon 10/4/2021, Historical Med      aspirin 81 mg chewable tablet Chew 1 tablet (81 mg total) daily, Starting Wed 5/15/2024, Normal      atorvastatin (LIPITOR) 40 mg tablet Take 1 tablet (40 mg total) by mouth every evening, Starting Thu 9/12/2024, Normal      bisacodyl (DULCOLAX) 5 mg EC tablet Take 2 tablets (10 mg total) by mouth once for 1 dose, Starting Tue 8/13/2024, Normal      clopidogrel (PLAVIX) 75 mg tablet Take 1 tablet (75 mg total) by mouth daily, Starting Thu 9/12/2024, Until Tue 3/11/2025, Normal      memantine (NAMENDA) 10 mg tablet Take 10 mg by mouth daily, Starting Mon 8/16/2021, Historical Med      Multiple Vitamin (MULTIVITAMIN) capsule Take 1 capsule by mouth daily, Historical Med      Multiple Vitamins-Minerals (OCUVITE PO) Take 1 tablet by mouth daily, Historical Med      nicotine (NICODERM CQ) 7 mg/24hr TD 24 hr patch Place 1 patch on the skin over 24 hours daily, Starting Tue 7/2/2024, Normal      propranolol (INDERAL) 20 mg tablet Historical Med      tamsulosin (FLOMAX) 0.4 mg Take 0.4 mg by mouth daily with dinner, Starting Mon 10/4/2021, Historical Med           No discharge procedures on  file.  ED SEPSIS DOCUMENTATION   Time reflects when diagnosis was documented in both MDM as applicable and the Disposition within this note       Time User Action Codes Description Comment    9/30/2024  1:35 PM Guille Briceno Add [R55] Near syncope                  Guille Briceno MD  09/30/24 3188

## 2024-10-28 ENCOUNTER — TELEPHONE (OUTPATIENT)
Dept: UROLOGY | Facility: CLINIC | Age: 84
End: 2024-10-28

## 2024-10-28 NOTE — TELEPHONE ENCOUNTER
Spouse Shana calling stating patient is getting nasty, forgetful and dizzy, Yesterday patient slept all day from 12:30pm till 8;00am   Patient had a blockage in groin area and r carotid  arteries   Patient is eating   Patient is not showering   He is not drinking his water ( suppose to have 4 bottles a day.) Patient is incontinent   Shana is requesting a phone call back

## 2024-10-28 NOTE — TELEPHONE ENCOUNTER
Wife is aware and understanding. She states that has all started about two weeks ago, and is getting worse. She is going to take him to the hospital when she gets home.

## 2024-10-28 NOTE — TELEPHONE ENCOUNTER
Please let her know to discuss that with patient's primary care physician as those symptoms are not related to any heart pathology.  We evaluated him prior to his carotid surgery from a cardiac standpoint and he did not have any cardiac pathology.  She may have to take him to the ER to get evaluated.

## 2024-11-20 ENCOUNTER — APPOINTMENT (EMERGENCY)
Dept: CT IMAGING | Facility: HOSPITAL | Age: 84
End: 2024-11-20
Payer: COMMERCIAL

## 2024-11-20 ENCOUNTER — HOSPITAL ENCOUNTER (EMERGENCY)
Facility: HOSPITAL | Age: 84
Discharge: HOME | End: 2024-11-20
Payer: COMMERCIAL

## 2024-11-20 ENCOUNTER — HOSPITAL ENCOUNTER (EMERGENCY)
Facility: HOSPITAL | Age: 84
Discharge: HOME | End: 2024-11-20
Attending: EMERGENCY MEDICINE
Payer: COMMERCIAL

## 2024-11-20 ENCOUNTER — NURSE TRIAGE (OUTPATIENT)
Age: 84
End: 2024-11-20

## 2024-11-20 VITALS
WEIGHT: 159.61 LBS | HEART RATE: 72 BPM | OXYGEN SATURATION: 98 % | RESPIRATION RATE: 20 BRPM | SYSTOLIC BLOOD PRESSURE: 133 MMHG | TEMPERATURE: 97.5 F | BODY MASS INDEX: 22.9 KG/M2 | DIASTOLIC BLOOD PRESSURE: 73 MMHG

## 2024-11-20 VITALS
RESPIRATION RATE: 16 BRPM | HEART RATE: 79 BPM | SYSTOLIC BLOOD PRESSURE: 167 MMHG | TEMPERATURE: 97.3 F | OXYGEN SATURATION: 96 % | DIASTOLIC BLOOD PRESSURE: 85 MMHG

## 2024-11-20 DIAGNOSIS — R20.2 PARESTHESIAS: Primary | ICD-10-CM

## 2024-11-20 LAB
ALBUMIN SERPL BCG-MCNC: 4.4 G/DL (ref 3.5–5)
ALP SERPL-CCNC: 110 U/L (ref 34–104)
ALT SERPL W P-5'-P-CCNC: 20 U/L (ref 7–52)
ANION GAP SERPL CALCULATED.3IONS-SCNC: 6 MMOL/L (ref 4–13)
AST SERPL W P-5'-P-CCNC: 19 U/L (ref 13–39)
ATRIAL RATE: 68 BPM
BASOPHILS # BLD AUTO: 0.03 THOUSANDS/ÂΜL (ref 0–0.1)
BASOPHILS NFR BLD AUTO: 0 % (ref 0–1)
BILIRUB SERPL-MCNC: 0.8 MG/DL (ref 0.2–1)
BUN SERPL-MCNC: 20 MG/DL (ref 5–25)
CALCIUM SERPL-MCNC: 9.1 MG/DL (ref 8.4–10.2)
CARDIAC TROPONIN I PNL SERPL HS: 4 NG/L (ref ?–50)
CHLORIDE SERPL-SCNC: 110 MMOL/L (ref 96–108)
CO2 SERPL-SCNC: 27 MMOL/L (ref 21–32)
CREAT SERPL-MCNC: 0.97 MG/DL (ref 0.6–1.3)
EOSINOPHIL # BLD AUTO: 0.17 THOUSAND/ÂΜL (ref 0–0.61)
EOSINOPHIL NFR BLD AUTO: 2 % (ref 0–6)
ERYTHROCYTE [DISTWIDTH] IN BLOOD BY AUTOMATED COUNT: 13.9 % (ref 11.6–15.1)
GFR SERPL CREATININE-BSD FRML MDRD: 72 ML/MIN/1.73SQ M
GLUCOSE SERPL-MCNC: 127 MG/DL (ref 65–140)
GLUCOSE SERPL-MCNC: 190 MG/DL (ref 65–140)
HCT VFR BLD AUTO: 45.6 % (ref 36.5–49.3)
HGB BLD-MCNC: 14.9 G/DL (ref 12–17)
IMM GRANULOCYTES # BLD AUTO: 0.03 THOUSAND/UL (ref 0–0.2)
IMM GRANULOCYTES NFR BLD AUTO: 0 % (ref 0–2)
LYMPHOCYTES # BLD AUTO: 1.85 THOUSANDS/ÂΜL (ref 0.6–4.47)
LYMPHOCYTES NFR BLD AUTO: 19 % (ref 14–44)
MCH RBC QN AUTO: 31.1 PG (ref 26.8–34.3)
MCHC RBC AUTO-ENTMCNC: 32.7 G/DL (ref 31.4–37.4)
MCV RBC AUTO: 95 FL (ref 82–98)
MONOCYTES # BLD AUTO: 0.62 THOUSAND/ÂΜL (ref 0.17–1.22)
MONOCYTES NFR BLD AUTO: 7 % (ref 4–12)
NEUTROPHILS # BLD AUTO: 6.91 THOUSANDS/ÂΜL (ref 1.85–7.62)
NEUTS SEG NFR BLD AUTO: 72 % (ref 43–75)
NRBC BLD AUTO-RTO: 0 /100 WBCS
P AXIS: 44 DEGREES
PLATELET # BLD AUTO: 141 THOUSANDS/UL (ref 149–390)
PMV BLD AUTO: 12.4 FL (ref 8.9–12.7)
POTASSIUM SERPL-SCNC: 3.7 MMOL/L (ref 3.5–5.3)
PR INTERVAL: 142 MS
PROT SERPL-MCNC: 7.1 G/DL (ref 6.4–8.4)
QRS AXIS: -25 DEGREES
QRSD INTERVAL: 98 MS
QT INTERVAL: 380 MS
QTC INTERVAL: 405 MS
RBC # BLD AUTO: 4.79 MILLION/UL (ref 3.88–5.62)
SODIUM SERPL-SCNC: 143 MMOL/L (ref 135–147)
T WAVE AXIS: -2 DEGREES
VENTRICULAR RATE: 68 BPM
WBC # BLD AUTO: 9.61 THOUSAND/UL (ref 4.31–10.16)

## 2024-11-20 PROCEDURE — 99285 EMERGENCY DEPT VISIT HI MDM: CPT

## 2024-11-20 PROCEDURE — 85025 COMPLETE CBC W/AUTO DIFF WBC: CPT

## 2024-11-20 PROCEDURE — 80053 COMPREHEN METABOLIC PANEL: CPT

## 2024-11-20 PROCEDURE — 93010 ELECTROCARDIOGRAM REPORT: CPT | Performed by: INTERNAL MEDICINE

## 2024-11-20 PROCEDURE — 93005 ELECTROCARDIOGRAM TRACING: CPT

## 2024-11-20 PROCEDURE — 82948 REAGENT STRIP/BLOOD GLUCOSE: CPT

## 2024-11-20 PROCEDURE — 84484 ASSAY OF TROPONIN QUANT: CPT

## 2024-11-20 PROCEDURE — 99285 EMERGENCY DEPT VISIT HI MDM: CPT | Performed by: EMERGENCY MEDICINE

## 2024-11-20 PROCEDURE — 70450 CT HEAD/BRAIN W/O DYE: CPT

## 2024-11-20 PROCEDURE — 36415 COLL VENOUS BLD VENIPUNCTURE: CPT

## 2024-11-20 NOTE — TELEPHONE ENCOUNTER
"Received a call from the patients's spouse reporting that the patient just told her that he has numbness in his arm and that this is the second time he has felt this in the past couple weeks. The patient has a long history of TIA's. The spouse seemed very anxious due to his reportedly many TIA's in the past. Advised the ER to put their minds at ease.       Reason for Disposition   Nursing judgment    Answer Assessment - Initial Assessment Questions  1. REASON FOR CALL: \"What is your main concern right now?\"      Left arm numbness   2. ONSET: \"When did the left arm numbness start?\"      2 weeks     6. TREATMENTS AND RESPONSE: \"What have you done so far to try to make this better? What medicines have you used?\"      Pt has had several TIA's in the past    Protocols used: No Protocol Available-Adult-[place]    "

## 2024-11-20 NOTE — ED NOTES
Pt not in waiting room when called to come to a treatment room     Karuna Fields RN  11/20/24 9722

## 2024-11-20 NOTE — ED PROVIDER NOTES
Time reflects when diagnosis was documented in both MDM as applicable and the Disposition within this note       Time User Action Codes Description Comment    11/20/2024  6:54 PM Guille Briceno Add [R20.2] Paresthesias           ED Disposition       ED Disposition   Discharge    Condition   Stable    Date/Time   Wed Nov 20, 2024  6:54 PM    Comment   Joe Aviles discharge to home/self care.                   Assessment & Plan       Medical Decision Making  1826: The patient appears well, vital signs reviewed.  The patient has nonspecific left dorsal forearm and left dorsal hand numbness over the past 4 weeks.  NIHSS 0.  For patient's reassurance plan to complete CT head.  Basic labs including cardiac enzymes were completed just prior to arrival, normal.  Check EKG.  Placed on the monitor.  At risk for peripheral nerve palsy due to left arm compression.  Patient is already on Plavix and aspirin, low suspicion for subacute CVA.  Not TNK candidate due to time of onset and non-debilitating symptoms.    1900: CT reviewed, no acute pathology.  The patient has remained stable throughout ED course.  Stable for discharge.    Amount and/or Complexity of Data Reviewed  External Data Reviewed: labs and notes.     Details: Triage labs completed just prior to arrival at outside hospital CBC, CMP and cardiac enzymes  Labs: ordered.  Radiology: ordered.     Details: CT head--no acute pathology  ECG/medicine tests: ordered and independent interpretation performed.     Details: Normal sinus rhythm 82 bpm, no acute ischemia, left axis deviation.             Medications - No data to display    ED Risk Strat Scores       Stroke Assessment       Row Name 11/20/24 1826             NIH Stroke Scale    Interval Baseline      Level of Consciousness (1a.) 0      LOC Questions (1b.) 0      LOC Commands (1c.) 0      Best Gaze (2.) 0      Visual (3.) 0      Facial Palsy (4.) 0      Motor Arm, Left (5a.) 0      Motor Arm, Right (5b.) 0       Motor Leg, Left (6a.) 0      Motor Leg, Right (6b.) 0      Limb Ataxia (7.) 0      Sensory (8.) 0      Best Language (9.) 0      Dysarthria (10.) 0      Extinction and Inattention (11.) (Formerly Neglect) 0      Total 0                    Flowsheet Row Most Recent Value   Thrombolytic Decision Options    Thrombolytic Decision Patient not a candidate.   Patient is not a candidate options Symptoms resolved/clearly non disabling., Unclear time of onset outside appropriate time window.                            SBIRT 22yo+      Flowsheet Row Most Recent Value   Initial Alcohol Screen: US AUDIT-C     1. How often do you have a drink containing alcohol? 0 Filed at: 11/20/2024 1833   2. How many drinks containing alcohol do you have on a typical day you are drinking?  0 Filed at: 11/20/2024 1833   3b. FEMALE Any Age, or MALE 65+: How often do you have 4 or more drinks on one occassion? 0 Filed at: 11/20/2024 1833   Audit-C Score 0 Filed at: 11/20/2024 1833   ZEINA: How many times in the past year have you...    Used an illegal drug or used a prescription medication for non-medical reasons? Never Filed at: 11/20/2024 1833                            History of Present Illness       Chief Complaint   Patient presents with    Numbness     Pt reports left arm numbness and b/l leg weakness for three weeks; seen at [facility] for same but left before provider exam       Past Medical History:   Diagnosis Date    BPH (benign prostatic hyperplasia)     Colon polyp     Dementia (HCC)     Diverticulitis     Hypertension     Macular degeneration     Stroke (HCC)       Past Surgical History:   Procedure Laterality Date    COLONOSCOPY      CORONARY ANGIOPLASTY WITH STENT PLACEMENT      CT CYSTOGRAM  1/14/2022    CYSTOSCOPY      CO TCAT IV STENT CRV CRTD ART EMBOLIC PROTECJ Right 05/22/2024    Procedure: ANGIOPLASTY ARTERY CAROTID W/ STENT;  Surgeon: Sven Barajas DO;  Location: [facility];  Service: Vascular    PROSTATE SURGERY       TONSILLECTOMY      VASECTOMY        Family History   Problem Relation Age of Onset    Brain cancer Mother     Cancer Brother         Bladder    Brain cancer Child       Social History     Tobacco Use    Smoking status: Every Day     Current packs/day: 0.50     Average packs/day: 0.5 packs/day for 67.9 years (33.9 ttl pk-yrs)     Types: Cigarettes     Start date: 1957    Smokeless tobacco: Never   Vaping Use    Vaping status: Never Used   Substance Use Topics    Alcohol use: Not Currently    Drug use: No      E-Cigarette/Vaping    E-Cigarette Use Never User       E-Cigarette/Vaping Substances    Nicotine No     THC No     CBD No     Flavoring No       I have reviewed and agree with the history as documented.       History provided by:  Medical records, patient and spouse  Medical Problem  Location:  Left forearm and left dorsal hand numbness  Severity:  Mild  Onset quality:  Gradual  Duration:  4 weeks  Timing:  Constant  Progression:  Unchanged  Chronicity:  New  Context:  Patient has history of TIAs, dementia, told his home health aide that he had some left arm numbness for the past 4 weeks, spouse states he has been sleeping a lot and felt like he had slept on his arm.  Went to outside hospital, the wait was long, they left but did have triage labs  Relieved by:  Nothing  Worsened by:  Nothing  Ineffective treatments:  None tried  Associated symptoms: no abdominal pain, no chest pain, no congestion, no cough, no diarrhea, no ear pain, no fatigue, no fever, no headaches, no loss of consciousness, no myalgias, no nausea, no rash, no rhinorrhea, no shortness of breath, no sore throat, no vomiting and no wheezing        Review of Systems   Constitutional:  Negative for appetite change, chills, fatigue and fever.   HENT:  Negative for congestion, ear pain, rhinorrhea, sore throat and trouble swallowing.    Eyes:  Negative for pain, discharge and visual disturbance.   Respiratory:  Negative for cough, chest tightness,  shortness of breath and wheezing.    Cardiovascular:  Negative for chest pain and palpitations.   Gastrointestinal:  Negative for abdominal pain, diarrhea, nausea and vomiting.   Endocrine: Negative for polydipsia, polyphagia and polyuria.   Genitourinary:  Negative for difficulty urinating, dysuria, hematuria and testicular pain.   Musculoskeletal:  Negative for arthralgias, back pain and myalgias.   Skin:  Negative for color change and rash.   Allergic/Immunologic: Negative for immunocompromised state.   Neurological:  Positive for numbness. Negative for dizziness, seizures, loss of consciousness, syncope, weakness and headaches.   Hematological:  Negative for adenopathy.   Psychiatric/Behavioral:  Negative for confusion and dysphoric mood.    All other systems reviewed and are negative.          Objective       ED Triage Vitals [11/20/24 1825]   Temperature Pulse Blood Pressure Respirations SpO2 Patient Position - Orthostatic VS   97.5 °F (36.4 °C) 81 162/80 16 97 % Lying      Temp Source Heart Rate Source BP Location FiO2 (%) Pain Score    Temporal Monitor Right arm -- No Pain      Vitals      Date and Time Temp Pulse SpO2 Resp BP Pain Score FACES Pain Rating User   11/20/24 1900 -- 80 98 % 21 141/74 -- -- SB   11/20/24 1831 -- -- -- -- -- No Pain -- SB   11/20/24 1825 97.5 °F (36.4 °C) 81 97 % 16 162/80 No Pain -- AS            Physical Exam  Vitals and nursing note reviewed.   Constitutional:       General: He is not in acute distress.     Appearance: Normal appearance. He is not ill-appearing, toxic-appearing or diaphoretic.   HENT:      Head: Normocephalic and atraumatic.      Nose: Nose normal. No congestion or rhinorrhea.      Mouth/Throat:      Mouth: Mucous membranes are moist.      Pharynx: Oropharynx is clear. No oropharyngeal exudate or posterior oropharyngeal erythema.   Eyes:      General:         Right eye: No discharge.         Left eye: No discharge.   Cardiovascular:      Rate and Rhythm: Normal  rate and regular rhythm.      Pulses: Normal pulses.      Heart sounds: Normal heart sounds. No murmur heard.     No gallop.   Pulmonary:      Effort: Pulmonary effort is normal. No respiratory distress.      Breath sounds: Normal breath sounds. No stridor. No wheezing, rhonchi or rales.   Chest:      Chest wall: No tenderness.   Abdominal:      General: Bowel sounds are normal. There is no distension.      Palpations: Abdomen is soft. There is no mass.      Tenderness: There is no abdominal tenderness. There is no right CVA tenderness, left CVA tenderness, guarding or rebound.      Hernia: No hernia is present.   Musculoskeletal:         General: Normal range of motion.      Cervical back: Normal range of motion and neck supple.   Skin:     General: Skin is warm and dry.      Capillary Refill: Capillary refill takes less than 2 seconds.   Neurological:      General: No focal deficit present.      Mental Status: He is alert and oriented to person, place, and time.      Cranial Nerves: No cranial nerve deficit.      Sensory: No sensory deficit.      Motor: No weakness.      Coordination: Coordination normal.      Gait: Gait normal.      Deep Tendon Reflexes: Reflexes normal.   Psychiatric:         Mood and Affect: Mood normal.         Behavior: Behavior normal.         Thought Content: Thought content normal.         Judgment: Judgment normal.         Results Reviewed       Procedure Component Value Units Date/Time    Fingerstick Glucose (POCT) [[id number]]  (Abnormal) Collected: 11/20/24 1828    Lab Status: Final result Specimen: Blood Updated: 11/20/24 1829     POC Glucose 190 mg/dl             CT head wo contrast   Final Interpretation by Avi Lebron MD (11/20 1852)      No acute intracranial abnormality.      Chronic microangiopathic changes.            Workstation performed: [id number]             Procedures    ED Medication and Procedure Management   Prior to Admission Medications   Prescriptions Last  Dose Informant Patient Reported? Taking?   Multiple Vitamin (MULTIVITAMIN) capsule  Spouse/Significant Other Yes No   Sig: Take 1 capsule by mouth daily   Patient not taking: Reported on 8/13/2024   Multiple Vitamins-Minerals (OCUVITE PO)  Spouse/Significant Other Yes No   Sig: Take 1 tablet by mouth daily   acetaminophen (TYLENOL) 325 mg tablet  Spouse/Significant Other No No   Sig: Take 2 tablets (650 mg total) by mouth every 6 (six) hours as needed for mild pain   amLODIPine (NORVASC) 5 mg tablet  Spouse/Significant Other Yes No   Sig: Take 5 mg by mouth daily   aspirin 81 mg chewable tablet  Spouse/Significant Other No No   Sig: Chew 1 tablet (81 mg total) daily   atorvastatin (LIPITOR) 40 mg tablet   No No   Sig: Take 1 tablet (40 mg total) by mouth every evening   bisacodyl (DULCOLAX) 5 mg EC tablet   No No   Sig: Take 2 tablets (10 mg total) by mouth once for 1 dose   clopidogrel (PLAVIX) 75 mg tablet   No No   Sig: Take 1 tablet (75 mg total) by mouth daily   memantine (NAMENDA) 10 mg tablet  Spouse/Significant Other Yes No   Sig: Take 10 mg by mouth daily   nicotine (NICODERM CQ) 7 mg/24hr TD 24 hr patch  Spouse/Significant Other No No   Sig: Place 1 patch on the skin over 24 hours daily   Patient not taking: Reported on 8/13/2024   propranolol (INDERAL) 20 mg tablet  Spouse/Significant Other Yes No   tamsulosin (FLOMAX) 0.4 mg  Spouse/Significant Other Yes No   Sig: Take 0.4 mg by mouth daily with dinner   Patient not taking: Reported on 8/13/2024      Facility-Administered Medications: None     Patient's Medications   Discharge Prescriptions    No medications on file     No discharge procedures on file.  ED SEPSIS DOCUMENTATION   Time reflects when diagnosis was documented in both MDM as applicable and the Disposition within this note       Time User Action Codes Description Comment    11/20/2024  6:54 PM Guille Briceno Add [R20.2] Paresthesias                  Guille Briceno MD  11/20/24 9824

## 2024-11-21 LAB
ATRIAL RATE: 82 BPM
P AXIS: 49 DEGREES
PR INTERVAL: 140 MS
QRS AXIS: -56 DEGREES
QRSD INTERVAL: 106 MS
QT INTERVAL: 394 MS
QTC INTERVAL: 460 MS
T WAVE AXIS: 30 DEGREES
VENTRICULAR RATE: 82 BPM

## 2024-11-21 PROCEDURE — 93010 ELECTROCARDIOGRAM REPORT: CPT | Performed by: INTERNAL MEDICINE

## 2024-12-10 ENCOUNTER — OFFICE VISIT (OUTPATIENT)
Dept: CARDIOLOGY CLINIC | Facility: CLINIC | Age: 84
End: 2024-12-10
Payer: COMMERCIAL

## 2024-12-10 VITALS
HEART RATE: 84 BPM | HEIGHT: 70 IN | DIASTOLIC BLOOD PRESSURE: 82 MMHG | BODY MASS INDEX: 23.19 KG/M2 | TEMPERATURE: 97.2 F | SYSTOLIC BLOOD PRESSURE: 158 MMHG | OXYGEN SATURATION: 96 % | WEIGHT: 162 LBS

## 2024-12-10 DIAGNOSIS — I73.9 PVD (PERIPHERAL VASCULAR DISEASE) (HCC): ICD-10-CM

## 2024-12-10 DIAGNOSIS — F17.219 CIGARETTE NICOTINE DEPENDENCE WITH NICOTINE-INDUCED DISORDER: ICD-10-CM

## 2024-12-10 DIAGNOSIS — I10 ESSENTIAL HYPERTENSION: Primary | ICD-10-CM

## 2024-12-10 DIAGNOSIS — I65.23 BILATERAL CAROTID ARTERY STENOSIS: ICD-10-CM

## 2024-12-10 DIAGNOSIS — R06.02 SHORTNESS OF BREATH: ICD-10-CM

## 2024-12-10 PROCEDURE — G2211 COMPLEX E/M VISIT ADD ON: HCPCS | Performed by: INTERNAL MEDICINE

## 2024-12-10 PROCEDURE — 99214 OFFICE O/P EST MOD 30 MIN: CPT | Performed by: INTERNAL MEDICINE

## 2024-12-10 RX ORDER — DONEPEZIL HYDROCHLORIDE 5 MG/1
5 TABLET, FILM COATED ORAL
COMMUNITY

## 2024-12-10 NOTE — ASSESSMENT & PLAN NOTE
Patient is status post uneventful right carotid endarterectomy and follows with vascular surgery.  He reports that they will continue to monitor his left carotid disease as intervention poses high risk of stroke.  However, on the vascular surgery evaluation note, it states that patient himself did not wish to proceed with a left carotid endarterectomy at the present time.  His lipids are excellent on statins.

## 2024-12-10 NOTE — ASSESSMENT & PLAN NOTE
His shortness of breath is due to his continued cigarette smoking and deconditioning due to his peripheral vascular disease.  He was encouraged to do his best to stop cigarette smoking.

## 2024-12-10 NOTE — PROGRESS NOTES
[facility] [place]  [address]  [address]  [facility] [id number] [phone]    Patient Name: Joe Aviles  YOB: 1940 ;male  MR No: [id number]        Diagnosis ICD-10-CM Associated Orders   1. Essential hypertension  I10       2. Bilateral carotid artery stenosis  I65.23       3. Shortness of breath  R06.02       4. PVD (peripheral vascular disease) ([facility])  I73.9       5. Cigarette nicotine dependence with nicotine-induced disorder  F17.219              Assessment and recommendations:    1. Essential hypertension  Assessment & Plan:  Patient has been having issues with his Hamilton catheter and was rushing in for today's visit and hence the blood pressure is slightly on the higher side.  Continue amlodipine and losartan at the present dose.  Echocardiogram from May 2024 showed normal left ventricular systolic function with no significant valvular pathology.  Lexiscan-MPI from 2023 from [facility] showed no evidence of ischemia or infarction.  2. Bilateral carotid artery stenosis  Assessment & Plan:  Patient is status post uneventful right carotid endarterectomy and follows with vascular surgery.  He reports that they will continue to monitor his left carotid disease as intervention poses high risk of stroke.  However, on the vascular surgery evaluation note, it states that patient himself did not wish to proceed with a left carotid endarterectomy at the present time.  His lipids are excellent on statins.  3. Shortness of breath  Assessment & Plan:  His shortness of breath is due to his continued cigarette smoking and deconditioning due to his peripheral vascular disease.  He was encouraged to do his best to stop cigarette smoking.  4. PVD (peripheral vascular disease) ([facility])  Assessment & Plan:  His lower extremity peripheral vascular disease remains stable.  He is on dual antiplatelet therapy with excellent lipid profile on statins.  Arterial duplex  study from 2023 reported:   The right middle superficial femoral artery demonstrates moderate   (50-75%) stenosis.     The left proximal and middle superficial femoral artery is occluded and reconstitutes at the distal superficial femoral artery.     The left distal external iliac artery demonstrates severe (>75%)   stenosis.   5. Cigarette nicotine dependence with nicotine-induced disorder  Patient was encouraged to do his best to stop cigarette smoking, but he does not feel that he will be able to do that.           CHIEF COMPLAINT:      Hypertension    HPI:   83-year-old male with past medical history significant for hypertension, known bilateral moderate to severe carotid stenosis, status post successful and uneventful right carotid endarterectomy in early 2024, known bilateral lower extremity arterial disease, chronic cigarette smoking who presents for follow-up visit.  Patient is doing quite well from cardiac perspective and denies any chest pain, palpitation or syncope.  He has chronic dyspnea on exertion secondary to his continued cigarette smoking.    He was seen in April 2024 for his first cardiology visit prior to his carotid endarterectomy.  There were no active cardiac issues and he was deemed to be intermediate risk from cardiac standpoint.  .  Patient was recently hospitalized in May 20[facility] with dizziness and some left arm numbness and CTA showed multiple indeterminate small infarcts in the right basal ganglia and right occipital lobe.  He was also noted to have 83% bilateral internal carotid artery stenosis.  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  Carotid duplex on May 14 reported more than 70% right internal carotid artery stenosis and 70 to 99% left internal carotid artery stenosis.      Past Medical History:   Diagnosis Date    BPH (benign prostatic hyperplasia)     Colon polyp     Dementia (HCC)     Diverticulitis      Hypertension     Macular degeneration     Stroke (HCC)           CURRENT  MEDICATIONS:      Current Outpatient Medications:     acetaminophen (TYLENOL) 325 mg tablet, Take 2 tablets (650 mg total) by mouth every 6 (six) hours as needed for mild pain, Disp: , Rfl:     amLODIPine (NORVASC) 5 mg tablet, Take 5 mg by mouth daily, Disp: , Rfl:     aspirin 81 mg chewable tablet, Chew 1 tablet (81 mg total) daily, Disp: 30 tablet, Rfl: 0    atorvastatin (LIPITOR) 40 mg tablet, Take 1 tablet (40 mg total) by mouth every evening, Disp: 30 tablet, Rfl: 3    bisacodyl (DULCOLAX) 5 mg EC tablet, Take 2 tablets (10 mg total) by mouth once for 1 dose, Disp: 2 tablet, Rfl: 0    clopidogrel (PLAVIX) 75 mg tablet, Take 1 tablet (75 mg total) by mouth daily, Disp: 90 tablet, Rfl: 2    donepezil (ARICEPT) 5 mg tablet, Take 5 mg by mouth daily at bedtime, Disp: , Rfl:     memantine (NAMENDA) 10 mg tablet, Take 10 mg by mouth daily, Disp: , Rfl:     Multiple Vitamins-Minerals (OCUVITE PO), Take 1 tablet by mouth daily, Disp: , Rfl:     propranolol (INDERAL) 20 mg tablet, , Disp: , Rfl:     Multiple Vitamin (MULTIVITAMIN) capsule, Take 1 capsule by mouth daily (Patient not taking: Reported on 8/13/2024), Disp: , Rfl:     nicotine (NICODERM CQ) 7 mg/24hr TD 24 hr patch, Place 1 patch on the skin over 24 hours daily (Patient not taking: Reported on 8/13/2024), Disp: 28 patch, Rfl: 0    tamsulosin (FLOMAX) 0.4 mg, Take 0.4 mg by mouth daily with dinner (Patient not taking: Reported on 8/13/2024), Disp: , Rfl:     ALLERGIES  No Known Allergies    Lab Results   Component Value Date    LDLCALC 59 05/14/2024    HDL 26 (L) 05/14/2024    CHOLESTEROL 111 05/14/2024    TRIG 129 05/14/2024    CREATININE 0.97 11/20/2024    CREATININE 1.04 09/30/2024    K 3.7 11/20/2024    K 3.8 09/30/2024    SODIUM 143 11/20/2024    SODIUM 139 09/30/2024    TSH 1.39 09/23/2024         REVIEW OF SYSTEMS   Positive for: Chronic cough, erectile dysfunction,  arthritis  Negative for: All remaining as reviewed below and in HPI.    SYSTEM SYMPTOMS REVIEWED:  General--weight change, fever, night sweats  Respiratory--cough, wheezing, shortness of breath, sputum production  Cardiovascular--chest pain, syncope, dyspnea on exertion, edema, decline in exercise tolerance, claudication   Gastrointestinal--persistent vomiting, diarrhea, abdominal distention, blood in stool   Muscular or skeletal--joint pain or swelling   Neurologic--headaches, syncope, abnormal movement  Hematologic--history of easy bruising and bleeding   Endocrine--thyroid enlargement, heat or cold intolerance, polyuria   Psychiatric--anxiety, depression     General physical examination:    General appearance: Alert, no acute distress, appears stated age, normal weight  HEENT: Mucous membranes are moist.  No obvious abnormality noted.  Neck: Supple with no lymphadenopathy.  No JVD.  Carotid pulses are intact.  Soft left carotid bruit.    Cardiovascular system: Regular rhythm.  Normal S1 and S2.  No murmurs.  No rubs or gallops. Extremities: No edema. No cyanosis.  Pulmonary: Respirations unlabored.  Good air entry bilaterally.  Clear to auscultation bilaterally.  Gastrointestinal: Abdomen is soft and nontender.  Bowel sounds are positive.  Musculoskeletal: Upper Extremities: Normal upper motor strength. Lower Extremity: Normal motor strength. Gait: Normal.   Skin: Skin is warm. No rashes or lesions.  Neurological: Patient is alert and oriented with no gross motor deficits.  Psychiatric: Mood is normal.  Behavior is normal.    Vitals:    12/10/24 1605   BP: 158/82   Pulse: 84   Temp: (!) 97.2 °F (36.2 °C)   SpO2: 96%      Body mass index is 23.24 kg/m².  Wt Readings from Last 3 Encounters:   12/10/24 73.5 kg (162 lb)   11/20/24 72.4 kg (159 lb 9.8 oz)   09/30/24 73.5 kg (162 lb)             Kraig Christensen MD, FACC, [place]    Portions of the record  have been created with voice recognition software.  Occasional  "grammatical mistakes or wrong word or \"sound alike\" substitutions may have occurred due to the inherent limitations of voice recognition software. Please reach out to me directly for any clarifications.   "

## 2024-12-10 NOTE — ASSESSMENT & PLAN NOTE
His lower extremity peripheral vascular disease remains stable.  He is on dual antiplatelet therapy with excellent lipid profile on statins.  Arterial duplex study from 2023 reported:   The right middle superficial femoral artery demonstrates moderate   (50-75%) stenosis.     The left proximal and middle superficial femoral artery is occluded and reconstitutes at the distal superficial femoral artery.     The left distal external iliac artery demonstrates severe (>75%)   stenosis.

## 2024-12-10 NOTE — ASSESSMENT & PLAN NOTE
Patient has been having issues with his Hamilton catheter and was rushing in for today's visit and hence the blood pressure is slightly on the higher side.  Continue amlodipine and losartan at the present dose.  Echocardiogram from May 2024 showed normal left ventricular systolic function with no significant valvular pathology.  Lexiscan-MPI from 2023 from [facility] showed no evidence of ischemia or infarction.

## 2024-12-26 ENCOUNTER — APPOINTMENT (EMERGENCY)
Dept: CT IMAGING | Facility: HOSPITAL | Age: 84
End: 2024-12-26
Payer: COMMERCIAL

## 2024-12-26 ENCOUNTER — HOSPITAL ENCOUNTER (EMERGENCY)
Facility: HOSPITAL | Age: 84
Discharge: HOME | End: 2024-12-26
Attending: EMERGENCY MEDICINE
Payer: COMMERCIAL

## 2024-12-26 VITALS
OXYGEN SATURATION: 97 % | RESPIRATION RATE: 19 BRPM | TEMPERATURE: 97.2 F | HEART RATE: 64 BPM | SYSTOLIC BLOOD PRESSURE: 136 MMHG | WEIGHT: 161.82 LBS | BODY MASS INDEX: 23.22 KG/M2 | DIASTOLIC BLOOD PRESSURE: 70 MMHG

## 2024-12-26 DIAGNOSIS — K92.1 BLOOD IN STOOL: Primary | ICD-10-CM

## 2024-12-26 LAB
ABO GROUP BLD: NORMAL
ALBUMIN SERPL BCG-MCNC: 4 G/DL (ref 3.5–5)
ALP SERPL-CCNC: 96 U/L (ref 34–104)
ALT SERPL W P-5'-P-CCNC: 21 U/L (ref 7–52)
ANION GAP SERPL CALCULATED.3IONS-SCNC: 4 MMOL/L (ref 4–13)
APTT PPP: 34 SECONDS (ref 23–34)
AST SERPL W P-5'-P-CCNC: 18 U/L (ref 13–39)
BASOPHILS # BLD AUTO: 0.05 THOUSANDS/ÂΜL (ref 0–0.1)
BASOPHILS NFR BLD AUTO: 1 % (ref 0–1)
BILIRUB SERPL-MCNC: 0.98 MG/DL (ref 0.2–1)
BLD GP AB SCN SERPL QL: NEGATIVE
BUN SERPL-MCNC: 17 MG/DL (ref 5–25)
CALCIUM SERPL-MCNC: 8.9 MG/DL (ref 8.4–10.2)
CARDIAC TROPONIN I PNL SERPL HS: 5 NG/L (ref ?–50)
CHLORIDE SERPL-SCNC: 112 MMOL/L (ref 96–108)
CO2 SERPL-SCNC: 27 MMOL/L (ref 21–32)
CREAT SERPL-MCNC: 1.08 MG/DL (ref 0.6–1.3)
EOSINOPHIL # BLD AUTO: 0.18 THOUSAND/ÂΜL (ref 0–0.61)
EOSINOPHIL NFR BLD AUTO: 2 % (ref 0–6)
ERYTHROCYTE [DISTWIDTH] IN BLOOD BY AUTOMATED COUNT: 14.4 % (ref 11.6–15.1)
GFR SERPL CREATININE-BSD FRML MDRD: 63 ML/MIN/1.73SQ M
GLUCOSE SERPL-MCNC: 154 MG/DL (ref 65–140)
HCT VFR BLD AUTO: 41.1 % (ref 36.5–49.3)
HGB BLD-MCNC: 13.8 G/DL (ref 12–17)
IMM GRANULOCYTES # BLD AUTO: 0.03 THOUSAND/UL (ref 0–0.2)
IMM GRANULOCYTES NFR BLD AUTO: 0 % (ref 0–2)
INR PPP: 0.97 (ref 0.85–1.19)
LYMPHOCYTES # BLD AUTO: 1.72 THOUSANDS/ÂΜL (ref 0.6–4.47)
LYMPHOCYTES NFR BLD AUTO: 19 % (ref 14–44)
MCH RBC QN AUTO: 31.9 PG (ref 26.8–34.3)
MCHC RBC AUTO-ENTMCNC: 33.6 G/DL (ref 31.4–37.4)
MCV RBC AUTO: 95 FL (ref 82–98)
MONOCYTES # BLD AUTO: 0.61 THOUSAND/ÂΜL (ref 0.17–1.22)
MONOCYTES NFR BLD AUTO: 7 % (ref 4–12)
NEUTROPHILS # BLD AUTO: 6.34 THOUSANDS/ÂΜL (ref 1.85–7.62)
NEUTS SEG NFR BLD AUTO: 71 % (ref 43–75)
NRBC BLD AUTO-RTO: 0 /100 WBCS
PLATELET # BLD AUTO: 133 THOUSANDS/UL (ref 149–390)
PMV BLD AUTO: 11.7 FL (ref 8.9–12.7)
POTASSIUM SERPL-SCNC: 4.1 MMOL/L (ref 3.5–5.3)
PROT SERPL-MCNC: 6.5 G/DL (ref 6.4–8.4)
PROTHROMBIN TIME: 13.3 SECONDS (ref 12.3–15)
RBC # BLD AUTO: 4.33 MILLION/UL (ref 3.88–5.62)
RH BLD: POSITIVE
SODIUM SERPL-SCNC: 143 MMOL/L (ref 135–147)
SPECIMEN EXPIRATION DATE: NORMAL
WBC # BLD AUTO: 8.93 THOUSAND/UL (ref 4.31–10.16)

## 2024-12-26 PROCEDURE — 86901 BLOOD TYPING SEROLOGIC RH(D): CPT | Performed by: EMERGENCY MEDICINE

## 2024-12-26 PROCEDURE — 85610 PROTHROMBIN TIME: CPT | Performed by: EMERGENCY MEDICINE

## 2024-12-26 PROCEDURE — 86850 RBC ANTIBODY SCREEN: CPT | Performed by: EMERGENCY MEDICINE

## 2024-12-26 PROCEDURE — 99285 EMERGENCY DEPT VISIT HI MDM: CPT

## 2024-12-26 PROCEDURE — 85730 THROMBOPLASTIN TIME PARTIAL: CPT | Performed by: EMERGENCY MEDICINE

## 2024-12-26 PROCEDURE — 85025 COMPLETE CBC W/AUTO DIFF WBC: CPT | Performed by: EMERGENCY MEDICINE

## 2024-12-26 PROCEDURE — 99285 EMERGENCY DEPT VISIT HI MDM: CPT | Performed by: EMERGENCY MEDICINE

## 2024-12-26 PROCEDURE — 36415 COLL VENOUS BLD VENIPUNCTURE: CPT | Performed by: EMERGENCY MEDICINE

## 2024-12-26 PROCEDURE — 93005 ELECTROCARDIOGRAM TRACING: CPT

## 2024-12-26 PROCEDURE — 74178 CT ABD&PLV WO CNTR FLWD CNTR: CPT

## 2024-12-26 PROCEDURE — 84484 ASSAY OF TROPONIN QUANT: CPT | Performed by: EMERGENCY MEDICINE

## 2024-12-26 PROCEDURE — 80053 COMPREHEN METABOLIC PANEL: CPT | Performed by: EMERGENCY MEDICINE

## 2024-12-26 PROCEDURE — 86900 BLOOD TYPING SEROLOGIC ABO: CPT | Performed by: EMERGENCY MEDICINE

## 2024-12-26 RX ADMIN — IOHEXOL 75 ML: 350 INJECTION, SOLUTION INTRAVENOUS at 11:36

## 2024-12-26 NOTE — ED PROVIDER NOTES
Time reflects when diagnosis was documented in both MDM as applicable and the Disposition within this note       Time User Action Codes Description Comment    12/26/2024  2:29 PM WillardLiang carlos Add [K92.1] Blood in stool           ED Disposition       ED Disposition   AMA    Condition   --    Date/Time   Thu Dec 26, 2024  2:29 PM    Comment   Date: 12/26/2024  Patient: Joe Aviles  Admitted: 12/26/2024 10:07 AM  Attending Provider: Liang Egan*    Joe Aviles or his authorized caregiver has made the decision for the patient to leave the emergency department against th e advice of his attending physician. He or his authorized caregiver has been informed and understands the inherent risks, including death, and permanent disability.  He or his authorized caregiver has decided to accept the responsibility for this dec ision. Joe Aviles and all necessary parties have been advised that he may return for further evaluation or treatment. His condition at time of discharge was stable.  Joe Aviles had current vital signs as follows:  /70   Pulse 64   T emp (!) 97.2 °F (36.2 °C) (Temporal)   Resp 19   Wt 73.4 kg (161 lb 13.1 oz)                Assessment & Plan       Medical Decision Making  84-year-old male presents to the emergency department for evaluation of left-sided flank pain associated with bloody stool, differential diagnosis include nephrolithiasis, pyelonephritis, diverticulitis, upper GI bleed, lower GI bleed, colitis, anemia, ALEJANDRINA, gastritis.  Will perform CT scan with IV contrast, basic labs, and reassess.  Anticipate admission with gastroenterology consult.    Amount and/or Complexity of Data Reviewed  Labs: ordered.  Radiology: ordered.    Risk  Prescription drug management.        ED Course as of 12/26/24 1825   Thu Dec 26, 2024   1330    No CT evidence of active high-volume gastrointestinal hemorrhage.     Diffuse colonic diverticulosis without evidence of  diverticulitis.     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     Prostatomegaly with bladder wall thickening. This is likely due to chronic outlet obstruction. Correlate for possible cystitis.     Stable hepatic cysts and hemangiomata.     1427 Discussed case with gastroenterology, Dr. Burnette will reach out to this patient tomorrow to schedule colonoscopy.  Patient states that he would not like to be admitted to the hospital.  Patient would like to leave AGAINST MEDICAL ADVICE.  He understands of the risks include death and permanent disability.  Patient has capacity, and understands all risk, I did explain to him that the bleeding could happen again, and he could get lightheaded, or pass out, patient understands the risk, discussed with wife, and also her sister, family is agreeable to plan.       Medications   iohexol (OMNIPAQUE) 350 MG/ML injection (MULTI-DOSE) 75 mL (75 mL Intravenous Given 12/26/24 1136)       ED Risk Strat Scores                          SBIRT 22yo+      Flowsheet Row Most Recent Value   Initial Alcohol Screen: US AUDIT-C     1. How often do you have a drink containing alcohol? 0 Filed at: 12/26/2024 1010   2. How many drinks containing alcohol do you have on a typical day you are drinking?  0 Filed at: 12/26/2024 1010   3b. FEMALE Any Age, or MALE 65+: How often do you have 4 or more drinks on one occassion? 0 Filed at: 12/26/2024 1010   Audit-C Score 0 Filed at: 12/26/2024 1010   ZEINA: How many times in the past year have you...    Used an illegal drug or used a prescription medication for non-medical reasons? Never Filed at: 12/26/2024 1010                            History of Present Illness       Chief Complaint   Patient presents with    Black or Bloody Stool     Pt reports blood in stool yesterday, none today.     Flank Pain     Pt reports left side flank pain x1 week.         Past Medical History:   Diagnosis Date    BPH (benign prostatic hyperplasia)     Colon polyp     Dementia (HCC)     Diverticulitis     Hypertension     Macular degeneration     Stroke (HCC)       Past Surgical History:   Procedure Laterality Date    COLONOSCOPY      CORONARY ANGIOPLASTY WITH STENT PLACEMENT      CT CYSTOGRAM  1/14/2022    CYSTOSCOPY      CO TCAT IV STENT CRV CRTD ART EMBOLIC PROTECJ Right 05/22/2024    Procedure: ANGIOPLASTY ARTERY CAROTID W/ STENT;  Surgeon: Sven Barajas DO;  Location: [facility];  Service: Vascular    PROSTATE SURGERY      TONSILLECTOMY      VASECTOMY        Family History   Problem Relation Age of Onset    Brain cancer Mother     Cancer Brother         Bladder    Brain cancer Child       Social History     Tobacco Use    Smoking status: Every Day     Current packs/day: 0.50     Average packs/day: 0.5 packs/day for 68.0 years (34.0 ttl pk-yrs)     Types: Cigarettes     Start date: 1957    Smokeless tobacco: Never   Vaping Use    Vaping status: Never Used   Substance Use Topics    Alcohol use: Not Currently    Drug use: No      E-Cigarette/Vaping    E-Cigarette Use Never User       E-Cigarette/Vaping Substances    Nicotine No     THC No     CBD No     Flavoring No       I have reviewed and agree with the history as documented.     84-year-old male with past medical history listed below presents to the emergency department with complaint of bloody stool that occurred yesterday.  Patient states that his stools were normal today.  Patient also noticed that he was having a left-sided flank pain that has been going on intermittently over the past week.  He states that his abdomen is distended.  He denies any chills, fevers, sweats, cough, mucus, chest pain.  Patient states that he always has episodes of shortness of breath.  He denies any bloody urine, pain with urination.  On initial evaluation, no gross blood on initial exam, Hemoccult positive.      Black or Bloody  Stool  Associated symptoms: no abdominal pain, no fever and no vomiting    Flank Pain  Associated symptoms: no chest pain, no chills, no cough, no dysuria, no fever, no hematuria, no shortness of breath, no sore throat and no vomiting        Review of Systems   Constitutional:  Negative for chills and fever.   HENT:  Negative for ear pain and sore throat.    Eyes:  Negative for pain and visual disturbance.   Respiratory:  Negative for cough and shortness of breath.    Cardiovascular:  Negative for chest pain and palpitations.   Gastrointestinal:  Positive for blood in stool. Negative for abdominal pain and vomiting.   Genitourinary:  Positive for flank pain. Negative for dysuria and hematuria.   Musculoskeletal:  Negative for arthralgias and back pain.   Skin:  Negative for color change and rash.   Neurological:  Negative for seizures and syncope.   All other systems reviewed and are negative.          Objective       ED Triage Vitals [12/26/24 1008]   Temperature Pulse Blood Pressure Respirations SpO2 Patient Position - Orthostatic VS   (!) 97.2 °F (36.2 °C) 70 146/71 18 97 % Sitting      Temp Source Heart Rate Source BP Location FiO2 (%) Pain Score    Temporal Monitor Left arm -- --      Vitals      Date and Time Temp Pulse SpO2 Resp BP Pain Score FACES Pain Rating User   12/26/24 1345 -- 64 97 % 19 136/70 -- -- KK   12/26/24 1215 -- 64 96 % 20 138/78 -- -- KK   12/26/24 1045 -- 64 98 % 20 172/77 -- -- KK   12/26/24 1015 -- 58 97 % 16 144/72 -- -- BF   12/26/24 1008 97.2 °F (36.2 °C) 70 97 % 18 146/71 -- -- MB            Physical Exam  Vitals and nursing note reviewed.   Constitutional:       General: He is not in acute distress.     Appearance: He is well-developed.   HENT:      Head: Normocephalic and atraumatic.   Eyes:      Conjunctiva/sclera: Conjunctivae normal.   Cardiovascular:      Rate and Rhythm: Normal rate and regular rhythm.      Heart sounds: No murmur heard.  Pulmonary:      Effort: Pulmonary  effort is normal. No respiratory distress.      Breath sounds: Normal breath sounds.   Abdominal:      Palpations: Abdomen is soft.      Tenderness: There is no abdominal tenderness. There is left CVA tenderness.   Musculoskeletal:         General: No swelling.      Cervical back: Neck supple.   Skin:     General: Skin is warm and dry.      Capillary Refill: Capillary refill takes less than 2 seconds.   Neurological:      Mental Status: He is alert.   Psychiatric:         Mood and Affect: Mood normal.         Results Reviewed       Procedure Component Value Units Date/Time    HS Troponin 0hr (reflex protocol) [[id number]]  (Normal) Collected: 12/26/24 1042    Lab Status: Final result Specimen: Blood from Arm, Left Updated: 12/26/24 1121     hs TnI 0hr 5 ng/L     Comprehensive metabolic panel [[id number]]  (Abnormal) Collected: 12/26/24 1042    Lab Status: Final result Specimen: Blood from Arm, Left Updated: 12/26/24 1109     Sodium 143 mmol/L      Potassium 4.1 mmol/L      Chloride 112 mmol/L      CO2 27 mmol/L      ANION GAP 4 mmol/L      BUN 17 mg/dL      Creatinine 1.08 mg/dL      Glucose 154 mg/dL      Calcium 8.9 mg/dL      AST 18 U/L      ALT 21 U/L      Alkaline Phosphatase 96 U/L      Total Protein 6.5 g/dL      Albumin 4.0 g/dL      Total Bilirubin 0.98 mg/dL      eGFR 63 ml/min/1.73sq m     Narrative:      [employer] Kid[employer] guidelines for Chronic Kidney Disease (CKD):     Stage 1 with normal or high GFR (GFR > 90 mL/min/1.73 square meters)    Stage 2 Mild CKD (GFR = 60-89 mL/min/1.73 square meters)    Stage 3A Moderate CKD (GFR = 45-59 mL/min/1.73 square meters)    Stage 3B Moderate CKD (GFR = 30-44 mL/min/1.73 square meters)    Stage 4 Severe CKD (GFR = 15-29 mL/min/1.73 square meters)    Stage 5 End Stage CKD (GFR <15 mL/min/1.73 square meters)  Note: GFR calculation is accurate only with a steady state creatinine    Protime-INR [[id number]]  (Normal) Collected: 12/26/24 1042    Lab Status:  Final result Specimen: Blood from Arm, Left Updated: 12/26/24 1106     Protime 13.3 seconds      INR 0.97    Narrative:      INR Therapeutic Range    Indication                                             INR Range      Atrial Fibrillation                                               2.0-3.0  Hypercoagulable State                                    2.0.2.3  Left Ventricular Asist Device                            2.0-3.0  Mechanical Heart Valve                                  -    Aortic(with afib, MI, embolism, HF, LA enlargement,    and/or coagulopathy)                                     2.0-3.0 (2.5-3.5)     Mitral                                                             2.5-3.5  Prosthetic/Bioprosthetic Heart Valve               2.0-3.0  Venous thromboembolism (VTE: VT, PE        2.0-3.0    APTT [[id number]]  (Normal) Collected: 12/26/24 1042    Lab Status: Final result Specimen: Blood from Arm, Left Updated: 12/26/24 1106     PTT 34 seconds     CBC and differential [[id number]]  (Abnormal) Collected: 12/26/24 1042    Lab Status: Final result Specimen: Blood from Arm, Left Updated: 12/26/24 1055     WBC 8.93 Thousand/uL      RBC 4.33 Million/uL      Hemoglobin 13.8 g/dL      Hematocrit 41.1 %      MCV 95 fL      MCH 31.9 pg      MCHC 33.6 g/dL      RDW 14.4 %      MPV 11.7 fL      Platelets 133 Thousands/uL      nRBC 0 /100 WBCs      Segmented % 71 %      Immature Grans % 0 %      Lymphocytes % 19 %      Monocytes % 7 %      Eosinophils Relative 2 %      Basophils Relative 1 %      Absolute Neutrophils 6.34 Thousands/µL      Absolute Immature Grans 0.03 Thousand/uL      Absolute Lymphocytes 1.72 Thousands/µL      Absolute Monocytes 0.61 Thousand/µL      Eosinophils Absolute 0.18 Thousand/µL      Basophils Absolute 0.05 Thousands/µL             CT high volume bleeding scan abdomen pelvis   Final Interpretation by Rishabh Blanton MD (12/26 1257)      No CT evidence of active high-volume gastrointestinal  hemorrhage.      Diffuse colonic diverticulosis without evidence of diverticulitis.      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      Prostatomegaly with bladder wall thickening. This is likely due to chronic outlet obstruction. Correlate for possible cystitis.      Stable hepatic cysts and hemangiomata.      Workstation performed: [id number]             Procedures    ED Medication and Procedure Management   Prior to Admission Medications   Prescriptions Last Dose Informant Patient Reported? Taking?   Multiple Vitamin (MULTIVITAMIN) capsule  Spouse/Significant Other Yes No   Sig: Take 1 capsule by mouth daily   Patient not taking: Reported on 8/13/2024   Multiple Vitamins-Minerals (OCUVITE PO)  Spouse/Significant Other Yes No   Sig: Take 1 tablet by mouth daily   acetaminophen (TYLENOL) 325 mg tablet  Spouse/Significant Other No No   Sig: Take 2 tablets (650 mg total) by mouth every 6 (six) hours as needed for mild pain   amLODIPine (NORVASC) 5 mg tablet  Spouse/Significant Other Yes No   Sig: Take 5 mg by mouth daily   aspirin 81 mg chewable tablet  Spouse/Significant Other No No   Sig: Chew 1 tablet (81 mg total) daily   atorvastatin (LIPITOR) 40 mg tablet   No No   Sig: Take 1 tablet (40 mg total) by mouth every evening   bisacodyl (DULCOLAX) 5 mg EC tablet   No No   Sig: Take 2 tablets (10 mg total) by mouth once for 1 dose   clopidogrel (PLAVIX) 75 mg tablet   No No   Sig: Take 1 tablet (75 mg total) by mouth daily   donepezil (ARICEPT) 5 mg tablet   Yes No   Sig: Take 5 mg by mouth daily at bedtime   memantine (NAMENDA) 10 mg tablet  Spouse/Significant Other Yes No   Sig: Take 10 mg by mouth daily   nicotine (NICODERM CQ) 7 mg/24hr TD 24 hr patch  Spouse/Significant Other No No   Sig: Place 1 patch on the skin over 24 hours daily   Patient not taking: Reported on 8/13/2024   propranolol (INDERAL) 20  mg tablet  Spouse/Significant Other Yes No   tamsulosin (FLOMAX) 0.4 mg  Spouse/Significant Other Yes No   Sig: Take 0.4 mg by mouth daily with dinner   Patient not taking: Reported on 8/13/2024      Facility-Administered Medications: None     Discharge Medication List as of 12/26/2024  2:32 PM        CONTINUE these medications which have NOT CHANGED    Details   acetaminophen (TYLENOL) 325 mg tablet Take 2 tablets (650 mg total) by mouth every 6 (six) hours as needed for mild pain, Starting Thu 5/23/2024, OTC      amLODIPine (NORVASC) 5 mg tablet Take 5 mg by mouth daily, Starting Mon 10/4/2021, Historical Med      aspirin 81 mg chewable tablet Chew 1 tablet (81 mg total) daily, Starting Wed 5/15/2024, Normal      atorvastatin (LIPITOR) 40 mg tablet Take 1 tablet (40 mg total) by mouth every evening, Starting Thu 9/12/2024, Normal      bisacodyl (DULCOLAX) 5 mg EC tablet Take 2 tablets (10 mg total) by mouth once for 1 dose, Starting Tue 8/13/2024, Normal      clopidogrel (PLAVIX) 75 mg tablet Take 1 tablet (75 mg total) by mouth daily, Starting Thu 9/12/2024, Until Tue 3/11/2025, Normal      donepezil (ARICEPT) 5 mg tablet Take 5 mg by mouth daily at bedtime, Historical Med      memantine (NAMENDA) 10 mg tablet Take 10 mg by mouth daily, Starting Mon 8/16/2021, Historical Med      Multiple Vitamin (MULTIVITAMIN) capsule Take 1 capsule by mouth daily, Historical Med      Multiple Vitamins-Minerals (OCUVITE PO) Take 1 tablet by mouth daily, Historical Med      nicotine (NICODERM CQ) 7 mg/24hr TD 24 hr patch Place 1 patch on the skin over 24 hours daily, Starting Tue 7/2/2024, Normal      propranolol (INDERAL) 20 mg tablet Historical Med      tamsulosin (FLOMAX) 0.4 mg Take 0.4 mg by mouth daily with dinner, Starting Mon 10/4/2021, Historical Med             ED SEPSIS DOCUMENTATION   Time reflects when diagnosis was documented in both MDM as applicable and the Disposition within this note       Time User Action Codes  Description Comment    12/26/2024  2:29 PM Liang Willard Add [K92.1] Blood in stool                  Liang Willard, DO  12/26/24 1826

## 2024-12-26 NOTE — DISCHARGE INSTRUCTIONS
You are seen in the emergency department for blood in your stool, your CT scan results are below, we discussed your case with gastroenterology, and they are going to call you tomorrow to schedule a colonoscopy.  We discussed the risks of leaving the emergency department without observation, which included/leading up to death appropriate disability, and you understood.  He requested to leave AGAINST MEDICAL ADVICE.  If your symptoms worsen or persist, please return to the emergency department immediately.    IMPRESSION:     No CT evidence of active high-volume gastrointestinal hemorrhage.     Diffuse colonic diverticulosis without evidence of diverticulitis.     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     Prostatomegaly with bladder wall thickening. This is likely due to chronic outlet obstruction. Correlate for possible cystitis.     Stable hepatic cysts and hemangiomata.

## 2024-12-27 LAB
ATRIAL RATE: 61 BPM
P AXIS: 61 DEGREES
PR INTERVAL: 134 MS
QRS AXIS: -44 DEGREES
QRSD INTERVAL: 108 MS
QT INTERVAL: 416 MS
QTC INTERVAL: 418 MS
T WAVE AXIS: 23 DEGREES
VENTRICULAR RATE: 61 BPM

## 2025-01-31 ENCOUNTER — APPOINTMENT (EMERGENCY)
Dept: CT IMAGING | Facility: HOSPITAL | Age: 85
End: 2025-01-31
Payer: COMMERCIAL

## 2025-01-31 ENCOUNTER — HOSPITAL ENCOUNTER (EMERGENCY)
Facility: HOSPITAL | Age: 85
Discharge: HOME | End: 2025-02-01
Attending: EMERGENCY MEDICINE | Admitting: EMERGENCY MEDICINE
Payer: COMMERCIAL

## 2025-01-31 DIAGNOSIS — K62.5 RECTAL BLEEDING: Primary | ICD-10-CM

## 2025-01-31 LAB
ABO GROUP BLD: NORMAL
ALBUMIN SERPL BCG-MCNC: 4.6 G/DL (ref 3.5–5)
ALP SERPL-CCNC: 123 U/L (ref 34–104)
ALT SERPL W P-5'-P-CCNC: 25 U/L (ref 7–52)
ANION GAP SERPL CALCULATED.3IONS-SCNC: 7 MMOL/L (ref 4–13)
AST SERPL W P-5'-P-CCNC: 20 U/L (ref 13–39)
BASOPHILS # BLD AUTO: 0.07 THOUSANDS/ΜL (ref 0–0.1)
BASOPHILS NFR BLD AUTO: 1 % (ref 0–1)
BILIRUB SERPL-MCNC: 0.6 MG/DL (ref 0.2–1)
BLD GP AB SCN SERPL QL: NEGATIVE
BUN SERPL-MCNC: 21 MG/DL (ref 5–25)
CALCIUM SERPL-MCNC: 9.9 MG/DL (ref 8.4–10.2)
CARDIAC TROPONIN I PNL SERPL HS: 5 NG/L (ref ?–50)
CHLORIDE SERPL-SCNC: 109 MMOL/L (ref 96–108)
CO2 SERPL-SCNC: 27 MMOL/L (ref 21–32)
CREAT SERPL-MCNC: 1.03 MG/DL (ref 0.6–1.3)
EOSINOPHIL # BLD AUTO: 0.18 THOUSAND/ΜL (ref 0–0.61)
EOSINOPHIL NFR BLD AUTO: 2 % (ref 0–6)
ERYTHROCYTE [DISTWIDTH] IN BLOOD BY AUTOMATED COUNT: 13.4 % (ref 11.6–15.1)
GFR SERPL CREATININE-BSD FRML MDRD: 67 ML/MIN/1.73SQ M
GLUCOSE SERPL-MCNC: 114 MG/DL (ref 65–140)
HCT VFR BLD AUTO: 44.8 % (ref 36.5–49.3)
HGB BLD-MCNC: 14.7 G/DL (ref 12–17)
IMM GRANULOCYTES # BLD AUTO: 0.05 THOUSAND/UL (ref 0–0.2)
IMM GRANULOCYTES NFR BLD AUTO: 1 % (ref 0–2)
LIPASE SERPL-CCNC: 32 U/L (ref 11–82)
LYMPHOCYTES # BLD AUTO: 2.4 THOUSANDS/ΜL (ref 0.6–4.47)
LYMPHOCYTES NFR BLD AUTO: 23 % (ref 14–44)
MCH RBC QN AUTO: 31.6 PG (ref 26.8–34.3)
MCHC RBC AUTO-ENTMCNC: 32.8 G/DL (ref 31.4–37.4)
MCV RBC AUTO: 96 FL (ref 82–98)
MONOCYTES # BLD AUTO: 0.75 THOUSAND/ΜL (ref 0.17–1.22)
MONOCYTES NFR BLD AUTO: 7 % (ref 4–12)
NEUTROPHILS # BLD AUTO: 6.94 THOUSANDS/ΜL (ref 1.85–7.62)
NEUTS SEG NFR BLD AUTO: 66 % (ref 43–75)
NRBC BLD AUTO-RTO: 0 /100 WBCS
PLATELET # BLD AUTO: 173 THOUSANDS/UL (ref 149–390)
PMV BLD AUTO: 12 FL (ref 8.9–12.7)
POTASSIUM SERPL-SCNC: 3.7 MMOL/L (ref 3.5–5.3)
PROT SERPL-MCNC: 7.7 G/DL (ref 6.4–8.4)
RBC # BLD AUTO: 4.65 MILLION/UL (ref 3.88–5.62)
RH BLD: POSITIVE
SODIUM SERPL-SCNC: 143 MMOL/L (ref 135–147)
SPECIMEN EXPIRATION DATE: NORMAL
WBC # BLD AUTO: 10.39 THOUSAND/UL (ref 4.31–10.16)

## 2025-01-31 PROCEDURE — 36415 COLL VENOUS BLD VENIPUNCTURE: CPT | Performed by: EMERGENCY MEDICINE

## 2025-01-31 PROCEDURE — 84484 ASSAY OF TROPONIN QUANT: CPT | Performed by: EMERGENCY MEDICINE

## 2025-01-31 PROCEDURE — 93005 ELECTROCARDIOGRAM TRACING: CPT

## 2025-01-31 PROCEDURE — 86901 BLOOD TYPING SEROLOGIC RH(D): CPT | Performed by: EMERGENCY MEDICINE

## 2025-01-31 PROCEDURE — 83690 ASSAY OF LIPASE: CPT | Performed by: EMERGENCY MEDICINE

## 2025-01-31 PROCEDURE — 74178 CT ABD&PLV WO CNTR FLWD CNTR: CPT

## 2025-01-31 PROCEDURE — 85025 COMPLETE CBC W/AUTO DIFF WBC: CPT | Performed by: EMERGENCY MEDICINE

## 2025-01-31 PROCEDURE — 99285 EMERGENCY DEPT VISIT HI MDM: CPT | Performed by: EMERGENCY MEDICINE

## 2025-01-31 PROCEDURE — 99284 EMERGENCY DEPT VISIT MOD MDM: CPT

## 2025-01-31 PROCEDURE — 96360 HYDRATION IV INFUSION INIT: CPT

## 2025-01-31 PROCEDURE — 86850 RBC ANTIBODY SCREEN: CPT | Performed by: EMERGENCY MEDICINE

## 2025-01-31 PROCEDURE — 80053 COMPREHEN METABOLIC PANEL: CPT | Performed by: EMERGENCY MEDICINE

## 2025-01-31 PROCEDURE — 86900 BLOOD TYPING SEROLOGIC ABO: CPT | Performed by: EMERGENCY MEDICINE

## 2025-01-31 RX ADMIN — IOHEXOL 85 ML: 350 INJECTION, SOLUTION INTRAVENOUS at 23:20

## 2025-01-31 RX ADMIN — SODIUM CHLORIDE 1000 ML: 0.9 INJECTION, SOLUTION INTRAVENOUS at 22:36

## 2025-02-01 ENCOUNTER — RESULTS FOLLOW-UP (OUTPATIENT)
Dept: EMERGENCY DEPT | Facility: HOSPITAL | Age: 85
End: 2025-02-01

## 2025-02-01 VITALS
RESPIRATION RATE: 16 BRPM | SYSTOLIC BLOOD PRESSURE: 137 MMHG | BODY MASS INDEX: 23.19 KG/M2 | OXYGEN SATURATION: 97 % | DIASTOLIC BLOOD PRESSURE: 85 MMHG | TEMPERATURE: 97.2 F | HEART RATE: 87 BPM | WEIGHT: 161.6 LBS

## 2025-02-01 LAB
BILIRUB UR QL STRIP: NEGATIVE
CLARITY UR: CLEAR
COLOR UR: YELLOW
GLUCOSE UR STRIP-MCNC: NEGATIVE MG/DL
HGB UR QL STRIP.AUTO: NEGATIVE
KETONES UR STRIP-MCNC: NEGATIVE MG/DL
LEUKOCYTE ESTERASE UR QL STRIP: NEGATIVE
NITRITE UR QL STRIP: NEGATIVE
PH UR STRIP.AUTO: 7 [PH]
PROT UR STRIP-MCNC: NEGATIVE MG/DL
SP GR UR STRIP.AUTO: 1.01 (ref 1–1.03)
UROBILINOGEN UR QL STRIP.AUTO: 0.2 E.U./DL

## 2025-02-01 PROCEDURE — 81003 URINALYSIS AUTO W/O SCOPE: CPT | Performed by: EMERGENCY MEDICINE

## 2025-02-01 NOTE — DISCHARGE INSTRUCTIONS
Please schedule a follow-up appointment with your gastroenterologist or the gastroenterologist listed on this paperwork if your gastroenterologist is unavailable.      Patient Education     Bloody Stools, Adult ED   General Information   You came to the Emergency Department (ED) because you had bloody stools. Bowel movement is another name for stool. Many things can cause blood in the stools. Some are serious things like bleeding from the intestine, cancer, or a serious bacterial infection. Often the cause is not serious. It may be irritation of a hemorrhoid or an anal fissure. Sometimes the doctors cannot find a specific cause. Bleeding in the stool is more concerning when it occurs often, becomes constant, or comes with other symptoms like fever.  What care is needed at home?   Call your regular doctor to let them know you were in the ED. Make a follow-up appointment if you were told to.  Take all your medicines as ordered.  Try to avoid straining when you have a bowel movement.  Doing the following can help you avoid constipation:  Eat high fiber foods. These include whole grains, fruits, and vegetables.  Drink plenty of water and other fluids each day.  Be active. Walk, garden, or do something active for 30 minutes or more on most days of the week.  Sitting in a warm bath a few times a day for about 15 minutes each time. This can help you relax and feel like you can have a bowel movement.  Keep a written diary of how often you have bloody stools.  When do I need to get emergency help?   Call an ambulance right away if:   You have heavy bleeding from your rectum.  You have severe belly pain.  You have chest discomfort or become short of breath along with your bloody stool.  Return to the ED if:   Your bleeding increases a lot.  You throw up blood or something that looks like coffee grounds.  You become very pale or your heart is racing all the time.  You feel very weak or become light-headed when you stand  up.  When do I need to call the doctor?   You have a fever of 100.4°F (38°C) or higher.  You have a headache together with your bloody stool.  You start to throw up a lot.  You have new or worsening symptoms.  Last Reviewed Date   2021-06-04  Consumer Information Use and Disclaimer   This generalized information is a limited summary of diagnosis, treatment, and/or medication information. It is not meant to be comprehensive and should be used as a tool to help the user understand and/or assess potential diagnostic and treatment options. It does NOT include all information about conditions, treatments, medications, side effects, or risks that may apply to a specific patient. It is not intended to be medical advice or a substitute for the medical advice, diagnosis, or treatment of a health care provider based on the health care provider's examination and assessment of a patient’s specific and unique circumstances. Patients must speak with a health care provider for complete information about their health, medical questions, and treatment options, including any risks or benefits regarding use of medications. This information does not endorse any treatments or medications as safe, effective, or approved for treating a specific patient. [employer] and its affiliates disclaim any warranty or liability relating to this information or the use thereof. The use of this information is governed by the Terms of Use, available at https://www.woltersethorityuwer.com/en/know/clinical-effectiveness-terms   Copyright   Copyright © 2024 [employer] and its affiliates and/or licensors. All rights reserved.

## 2025-02-01 NOTE — ED PROVIDER NOTES
Time reflects when diagnosis was documented in both MDM as applicable and the Disposition within this note       Time User Action Codes Description Comment    1/31/2025 11:51 PM Oleg Arcadio Add [K62.5] Rectal bleeding           ED Disposition       ED Disposition   Discharge    Condition   Stable    Date/Time   Fri Jan 31, 2025 11:51 PM    Comment   Joe NASEEM Aviles discharge to home/self care.                   Assessment & Plan       Medical Decision Making  Based on the history and medical screening exam performed the patient may be at risk for diverticulosis, hemorrhoids, active GI bleeding, anal fissure    Based on the work-up performed in the emergency room which includes physical examination, and which may include laboratory studies and imaging as warranted including advanced imaging such as CT scan or ultrasound, the diagnostic considerations are narrowed to exclude limb or life-threatening process.    The patient is stable for discharge.  Patient remains hemodynamically stable with normal hemoglobin.  No abdominal pain.  Review of prior colonoscopy reveals internal hemorrhoid.  Plan for discharge if CT abdomen pelvis negative.  Advised follow-up with gastroenterology, patient is agreeable    Amount and/or Complexity of Data Reviewed  Labs: ordered. Decision-making details documented in ED Course.     Details: Hemoglobin normal, labs stable  Radiology: ordered and independent interpretation performed. Decision-making details documented in ED Course.     Details: CT abdomen pelvis pending at time of signout to Dr. Samuel  ECG/medicine tests: ordered and independent interpretation performed. Decision-making details documented in ED Course.     Details: Normal sinus rhythm rate 69 with left axis    Risk  Prescription drug management.        ED Course as of 01/31/25 2353   Fri Jan 31, 2025   2350 Will sign out to Dr. Samuel pending CT abdomen pelvis with plan for discharge if negative.  Patient has  been provided outpatient follow-up information for gastroenterology.       Medications   sodium chloride 0.9 % bolus 1,000 mL (1,000 mL Intravenous New Bag 1/31/25 2236)   iohexol (OMNIPAQUE) 350 MG/ML injection (MULTI-DOSE) 85 mL (85 mL Intravenous Given 1/31/25 2320)       ED Risk Strat Scores                          SBIRT 20yo+      Flowsheet Row Most Recent Value   Initial Alcohol Screen: US AUDIT-C     1. How often do you have a drink containing alcohol? 0 Filed at: 01/31/2025 2213   2. How many drinks containing alcohol do you have on a typical day you are drinking?  0 Filed at: 01/31/2025 2213   3b. FEMALE Any Age, or MALE 65+: How often do you have 4 or more drinks on one occassion? 0 Filed at: 01/31/2025 2213   Audit-C Score 0 Filed at: 01/31/2025 2213   ZEINA: How many times in the past year have you...    Used an illegal drug or used a prescription medication for non-medical reasons? Never Filed at: 01/31/2025 2213                            History of Present Illness       Chief Complaint   Patient presents with    Rectal Bleeding     Patient reports bright red blood in stool, noticed 45 minutes ago. Is on plavix     Back Pain     Reports mid to lower back pain x2 weeks, denies trauma   Denies pain on arrival but family member at bedside states it hurts          Past Medical History:   Diagnosis Date    BPH (benign prostatic hyperplasia)     Colon polyp     Dementia (HCC)     Diverticulitis     Hypertension     Macular degeneration     Stroke (HCC)       Past Surgical History:   Procedure Laterality Date    COLONOSCOPY      CORONARY ANGIOPLASTY WITH STENT PLACEMENT      CT CYSTOGRAM  1/14/2022    CYSTOSCOPY      [place] TCAT IV STENT CRV CRTD ART EMBOLIC PROTECJ Right 05/22/2024    Procedure: ANGIOPLASTY ARTERY CAROTID W/ STENT;  Surgeon: Sven Barajas DO;  Location: [facility];  Service: Vascular    PROSTATE SURGERY      TONSILLECTOMY      VASECTOMY        Family History   Problem Relation Age of Onset     Brain cancer Mother     Cancer Brother         Bladder    Brain cancer Child       Social History     Tobacco Use    Smoking status: Every Day     Current packs/day: 0.50     Average packs/day: 0.5 packs/day for 68.1 years (34.0 ttl pk-yrs)     Types: Cigarettes     Start date: 1957    Smokeless tobacco: Never   Vaping Use    Vaping status: Never Used   Substance Use Topics    Alcohol use: Not Currently    Drug use: No      E-Cigarette/Vaping    E-Cigarette Use Never User       E-Cigarette/Vaping Substances    Nicotine No     THC No     CBD No     Flavoring No       I have reviewed and agree with the history as documented.     Complains of episode of rectal bleeding today.  States he passed stools and there was blood mixed in with the stool.  He denied pain with stooling.  He does complain of dizziness/lightheadedness.  He states he has had similar previous episodes which were previously attributed to hemorrhoids.  He is followed by [facility] gastroenterology.  Last colonoscopy approximately 6 months ago revealed 1 internal hemorrhoid without bleeding..  Patient is on Plavix.  No chest pain or shortness of breath.  No abdominal pain.      History provided by:  Patient   used: No    Rectal Bleeding - Minor  Quality:  Bright red  Amount:  Moderate  Timing:  Constant  Context: hemorrhoids    Relieved by:  Nothing  Worsened by:  Nothing  Ineffective treatments:  None tried  Associated symptoms: dizziness and light-headedness    Associated symptoms: no abdominal pain, no epistaxis, no fever, no hematemesis, no loss of consciousness and no vomiting    Risk factors: anticoagulant use        Review of Systems   Constitutional:  Negative for chills and fever.   HENT:  Negative for ear pain, hearing loss, nosebleeds, sore throat, trouble swallowing and voice change.    Eyes:  Negative for pain and discharge.   Respiratory:  Negative for cough, shortness of breath and wheezing.    Cardiovascular:   Negative for chest pain and palpitations.   Gastrointestinal:  Positive for hematochezia. Negative for abdominal pain, blood in stool, constipation, diarrhea, hematemesis, nausea and vomiting.   Genitourinary:  Negative for dysuria, flank pain, frequency and hematuria.   Musculoskeletal:  Negative for joint swelling, neck pain and neck stiffness.   Skin:  Negative for rash and wound.   Neurological:  Positive for dizziness and light-headedness. Negative for seizures, loss of consciousness, syncope, facial asymmetry and headaches.   Psychiatric/Behavioral:  Negative for hallucinations, self-injury and suicidal ideas.    All other systems reviewed and are negative.          Objective       ED Triage Vitals [01/31/25 2214]   Temperature Pulse Blood Pressure Respirations SpO2 Patient Position - Orthostatic VS   (!) 97.2 °F (36.2 °C) 84 (!) 178/89 14 97 % Sitting      Temp Source Heart Rate Source BP Location FiO2 (%) Pain Score    Temporal Monitor Left arm -- No Pain      Vitals      Date and Time Temp Pulse SpO2 Resp BP Pain Score FACES Pain Rating User   01/31/25 2246 -- -- -- -- -- No Pain -- AD   01/31/25 2238 -- 87 -- 16 131/75 -- -- AD   01/31/25 2237 -- 84 -- 18 154/75 -- -- AD   01/31/25 2236 -- 82 -- 16 135/71 -- -- AD   01/31/25 2214 97.2 °F (36.2 °C) 84 97 % 14 178/89 No Pain -- KK            Physical Exam  Vitals and nursing note reviewed.   Constitutional:       General: He is not in acute distress.     Appearance: He is well-developed.   HENT:      Head: Normocephalic and atraumatic.      Right Ear: External ear normal.      Left Ear: External ear normal.   Eyes:      General: No scleral icterus.        Right eye: No discharge.         Left eye: No discharge.      Extraocular Movements: Extraocular movements intact.      Conjunctiva/sclera: Conjunctivae normal.   Cardiovascular:      Rate and Rhythm: Normal rate and regular rhythm.      Heart sounds: Normal heart sounds. No murmur heard.  Pulmonary:       Effort: Pulmonary effort is normal.      Breath sounds: Normal breath sounds. No wheezing or rales.   Abdominal:      General: Bowel sounds are normal. There is no distension.      Palpations: Abdomen is soft.      Tenderness: There is no abdominal tenderness. There is no guarding or rebound.   Genitourinary:     Comments: Anus and external rectal area normal.  Grossly bloody stool.  No palpable hemorrhoids.  No anal fissures.  Musculoskeletal:         General: No deformity. Normal range of motion.      Cervical back: Normal range of motion and neck supple.   Skin:     General: Skin is warm and dry.      Findings: No rash.   Neurological:      General: No focal deficit present.      Mental Status: He is alert and oriented to person, place, and time.      Cranial Nerves: No cranial nerve deficit.   Psychiatric:         Mood and Affect: Mood normal.         Behavior: Behavior normal.         Thought Content: Thought content normal.         Judgment: Judgment normal.         Results Reviewed       Procedure Component Value Units Date/Time    HS Troponin 0hr (reflex protocol) [[id number]]  (Normal) Collected: 01/31/25 2241    Lab Status: Final result Specimen: Blood from Arm, Left Updated: 01/31/25 2314     hs TnI 0hr 5 ng/L     Comprehensive metabolic panel [[id number]]  (Abnormal) Collected: 01/31/25 2241    Lab Status: Final result Specimen: Blood from Arm, Left Updated: 01/31/25 2310     Sodium 143 mmol/L      Potassium 3.7 mmol/L      Chloride 109 mmol/L      CO2 27 mmol/L      ANION GAP 7 mmol/L      BUN 21 mg/dL      Creatinine 1.03 mg/dL      Glucose 114 mg/dL      Calcium 9.9 mg/dL      AST 20 U/L      ALT 25 U/L      Alkaline Phosphatase 123 U/L      Total Protein 7.7 g/dL      Albumin 4.6 g/dL      Total Bilirubin 0.60 mg/dL      eGFR 67 ml/min/1.73sq m     Narrative:      National Kidney Disease Foundation guidelines for Chronic Kidney Disease (CKD):     Stage 1 with normal or high GFR (GFR > 90 mL/min/1.73  square meters)    Stage 2 Mild CKD (GFR = 60-89 mL/min/1.73 square meters)    Stage 3A Moderate CKD (GFR = 45-59 mL/min/1.73 square meters)    Stage 3B Moderate CKD (GFR = 30-44 mL/min/1.73 square meters)    Stage 4 Severe CKD (GFR = 15-29 mL/min/1.73 square meters)    Stage 5 End Stage CKD (GFR <15 mL/min/1.73 square meters)  Note: GFR calculation is accurate only with a steady state creatinine    Lipase [[id number]]  (Normal) Collected: 01/31/25 2241    Lab Status: Final result Specimen: Blood from Arm, Left Updated: 01/31/25 2310     Lipase 32 u/L     CBC and differential [[id number]]  (Abnormal) Collected: 01/31/25 2241    Lab Status: Final result Specimen: Blood from Arm, Left Updated: 01/31/25 2253     WBC 10.39 Thousand/uL      RBC 4.65 Million/uL      Hemoglobin 14.7 g/dL      Hematocrit 44.8 %      MCV 96 fL      MCH 31.6 pg      MCHC 32.8 g/dL      RDW 13.4 %      MPV 12.0 fL      Platelets 173 Thousands/uL      nRBC 0 /100 WBCs      Segmented % 66 %      Immature Grans % 1 %      Lymphocytes % 23 %      Monocytes % 7 %      Eosinophils Relative 2 %      Basophils Relative 1 %      Absolute Neutrophils 6.94 Thousands/µL      Absolute Immature Grans 0.05 Thousand/uL      Absolute Lymphocytes 2.40 Thousands/µL      Absolute Monocytes 0.75 Thousand/µL      Eosinophils Absolute 0.18 Thousand/µL      Basophils Absolute 0.07 Thousands/µL             CT high volume bleeding scan abdomen pelvis    (Results Pending)       ECG 12 Lead Documentation Only    Date/Time: 1/31/2025 10:39 PM    Performed by: Arcadio Dejesus MD  Authorized by: Arcadio Dejesus MD    ECG reviewed by me, the ED Provider: yes    Patient location:  ED  Previous ECG:     Previous ECG:  Unavailable  Interpretation:     Interpretation: normal    Rate:     ECG rate:  69    ECG rate assessment: normal    Rhythm:     Rhythm: sinus rhythm    Ectopy:     Ectopy: none    QRS:     QRS axis:  Left    QRS intervals:  Normal  Conduction:     Conduction:  normal    ST segments:     ST segments:  Normal  T waves:     T waves: normal        ED Medication and Procedure Management   Prior to Admission Medications   Prescriptions Last Dose Informant Patient Reported? Taking?   Multiple Vitamin (MULTIVITAMIN) capsule  Spouse/Significant Other Yes No   Sig: Take 1 capsule by mouth daily   Patient not taking: Reported on 8/13/2024   Multiple Vitamins-Minerals (OCUVITE PO)  Spouse/Significant Other Yes No   Sig: Take 1 tablet by mouth daily   acetaminophen (TYLENOL) 325 mg tablet  Spouse/Significant Other No No   Sig: Take 2 tablets (650 mg total) by mouth every 6 (six) hours as needed for mild pain   amLODIPine (NORVASC) 5 mg tablet  Spouse/Significant Other Yes No   Sig: Take 5 mg by mouth daily   aspirin 81 mg chewable tablet  Spouse/Significant Other No No   Sig: Chew 1 tablet (81 mg total) daily   atorvastatin (LIPITOR) 40 mg tablet   No No   Sig: Take 1 tablet (40 mg total) by mouth every evening   bisacodyl (DULCOLAX) 5 mg EC tablet   No No   Sig: Take 2 tablets (10 mg total) by mouth once for 1 dose   clopidogrel (PLAVIX) 75 mg tablet   No No   Sig: Take 1 tablet (75 mg total) by mouth daily   donepezil (ARICEPT) 5 mg tablet   Yes No   Sig: Take 5 mg by mouth daily at bedtime   memantine (NAMENDA) 10 mg tablet  Spouse/Significant Other Yes No   Sig: Take 10 mg by mouth daily   nicotine (NICODERM CQ) 7 mg/24hr TD 24 hr patch  Spouse/Significant Other No No   Sig: Place 1 patch on the skin over 24 hours daily   Patient not taking: Reported on 8/13/2024   propranolol (INDERAL) 20 mg tablet  Spouse/Significant Other Yes No   tamsulosin (FLOMAX) 0.4 mg  Spouse/Significant Other Yes No   Sig: Take 0.4 mg by mouth daily with dinner   Patient not taking: Reported on 8/13/2024      Facility-Administered Medications: None     Patient's Medications   Discharge Prescriptions    No medications on file     No discharge procedures on file.  ED SEPSIS DOCUMENTATION   Time reflects  when diagnosis was documented in both MDM as applicable and the Disposition within this note       Time User Action Codes Description Comment    1/31/2025 11:51 PM Arcadio Dejesus Add [K62.5] Rectal bleeding                  Arcadio Dejesus MD  01/31/25 0783

## 2025-02-01 NOTE — ED CARE HANDOFF
Emergency Department Sign Out Note        Sign out and transfer of care from Dr Dejesus. See Separate Emergency Department note.     The patient, Joe Aviles, was evaluated by the previous provider for rectal bleeding.    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, patient is aware of this and is already following with vascular surgery, the urinary bladder is incompletely distended and appears thickened, consider cystitis, a UA was ordered and patient provided a urine sample, results are pending, multiple hepatic cysts were noted, which patient was unaware of, however I pulled up his CT result from 2018 which showed hepatic cysts at that time, patient and caregiver reassured that these findings are benign, also evidence of diverticulosis without diverticulitis and a large amount of stool in the colon        UA unremarkable, patient to be discharged with outpatient follow-up, advised to return if any additional concerns                             Procedures  Medical Decision Making  Amount and/or Complexity of Data Reviewed  Labs: ordered.  Radiology: ordered.    Risk  Prescription drug management.            Disposition  Final diagnoses:   Rectal bleeding     Time reflects when diagnosis was documented in both MDM as applicable and the Disposition within this note       Time User Action Codes Description Comment    1/31/2025 11:51 PM Arcadio Dejesus Add [K62.5] Rectal bleeding           ED Disposition       ED Disposition   Discharge    Condition   Stable    Date/Time   Fri Jan 31, 2025 11:51 PM    Comment   Joe Aviles discharge to home/self care.                   Follow-up Information       Follow up With  Specialties Details Why Contact Info    Jayme Wolff MD Internal Medicine   [address]  [address]  [place] [address]  [phone]      Reji Burnette MD Gastroenterology   [address] [place] [address] [place] [address]  [phone]            Patient's Medications   Discharge Prescriptions    No medications on file     No discharge procedures on file.       ED Provider  Electronically Signed by     Socorro Samuel DO  02/01/25 0127

## 2025-02-03 LAB
ATRIAL RATE: 69 BPM
ATRIAL RATE: 70 BPM
P AXIS: 52 DEGREES
P AXIS: 59 DEGREES
PR INTERVAL: 138 MS
PR INTERVAL: 142 MS
QRS AXIS: -41 DEGREES
QRS AXIS: -42 DEGREES
QRSD INTERVAL: 108 MS
QRSD INTERVAL: 110 MS
QT INTERVAL: 416 MS
QT INTERVAL: 422 MS
QTC INTERVAL: 445 MS
QTC INTERVAL: 455 MS
T WAVE AXIS: 24 DEGREES
T WAVE AXIS: 25 DEGREES
VENTRICULAR RATE: 69 BPM
VENTRICULAR RATE: 70 BPM

## 2025-02-10 ENCOUNTER — OFFICE VISIT (OUTPATIENT)
Dept: VASCULAR SURGERY | Facility: CLINIC | Age: 85
End: 2025-02-10
Payer: COMMERCIAL

## 2025-02-10 VITALS
WEIGHT: 159 LBS | SYSTOLIC BLOOD PRESSURE: 145 MMHG | BODY MASS INDEX: 22.76 KG/M2 | OXYGEN SATURATION: 96 % | HEART RATE: 75 BPM | DIASTOLIC BLOOD PRESSURE: 82 MMHG | HEIGHT: 70 IN

## 2025-02-10 DIAGNOSIS — I73.9 PVD (PERIPHERAL VASCULAR DISEASE) (HCC): ICD-10-CM

## 2025-02-10 DIAGNOSIS — K92.1 BLOOD IN STOOL: ICD-10-CM

## 2025-02-10 DIAGNOSIS — I73.9 CLAUDICATION (HCC): ICD-10-CM

## 2025-02-10 DIAGNOSIS — F03.90 DEMENTIA, UNSPECIFIED DEMENTIA SEVERITY, UNSPECIFIED DEMENTIA TYPE, UNSPECIFIED WHETHER BEHAVIORAL, PSYCHOTIC, OR MOOD DISTURBANCE OR ANXIETY (HCC): ICD-10-CM

## 2025-02-10 DIAGNOSIS — I65.22 ASYMPTOMATIC STENOSIS OF LEFT CAROTID ARTERY: ICD-10-CM

## 2025-02-10 DIAGNOSIS — I65.22 CAROTID STENOSIS, ASYMPTOMATIC, LEFT: Primary | ICD-10-CM

## 2025-02-10 DIAGNOSIS — F17.219 CIGARETTE NICOTINE DEPENDENCE WITH NICOTINE-INDUCED DISORDER: ICD-10-CM

## 2025-02-10 PROCEDURE — 99214 OFFICE O/P EST MOD 30 MIN: CPT | Performed by: SURGERY

## 2025-02-10 NOTE — ASSESSMENT & PLAN NOTE
Joe is a pleasant 84-year-old gentleman who presents to the office accompanied by his wife in follow-up from recent hospitalization where he was worked up and treated for a diverticular bleed.  Patient at that time had his Plavix held however he reports over the last couple days has resumed.  He has had no further episodes of GI bleeding.  At last office visit we did discuss that he has high-grade left carotid stenosis.  He was on the fence whether he was like to proceed with left carotid intervention.  Today in the office he reports that he is leaning towards intervention.  As such recommend a CTA of the head and neck with return office visit to discuss results and possible left transcarotid artery revascularization.  I was quite explicit and stating that this procedure would not improve his overall cognition/history of dementia.  Will discuss further details of procedure including risk benefits and anticipated postoperative course at next office visit.  As we are planning on possible left carotid stenting he should continue on his aspirin, Plavix, and atorvastatin.  Certainly if he develops recurrent GI bleeds he may hold his antiplatelet therapy.

## 2025-02-10 NOTE — ASSESSMENT & PLAN NOTE
"Today in the office Joe also reports symptoms suggestive of claudication.  He reported that recently he had to walk approximately 1 mile to go purchase \"cigarettes\" and needed to stop 3 times.  At this time recommend a lower extremity arterial duplex with return office visit discussed results.  We also discussed the benefits of complete smoking cessation and daily ambulation.      Orders:    VAS ARTERIAL DUPLEX- LOWER LIMB BILATERAL; Future    "

## 2025-02-21 ENCOUNTER — HOSPITAL ENCOUNTER (OUTPATIENT)
Dept: NON INVASIVE DIAGNOSTICS | Facility: HOSPITAL | Age: 85
Discharge: HOME | End: 2025-02-21
Attending: SURGERY
Payer: COMMERCIAL

## 2025-02-21 ENCOUNTER — HOSPITAL ENCOUNTER (OUTPATIENT)
Dept: CT IMAGING | Facility: HOSPITAL | Age: 85
End: 2025-02-21
Attending: SURGERY
Payer: COMMERCIAL

## 2025-02-21 DIAGNOSIS — I65.22 CAROTID STENOSIS, ASYMPTOMATIC, LEFT: ICD-10-CM

## 2025-02-21 DIAGNOSIS — I73.9 CLAUDICATION (HCC): ICD-10-CM

## 2025-02-21 PROCEDURE — 93923 UPR/LXTR ART STDY 3+ LVLS: CPT

## 2025-02-21 PROCEDURE — 93922 UPR/L XTREMITY ART 2 LEVELS: CPT | Performed by: SURGERY

## 2025-02-21 PROCEDURE — 70498 CT ANGIOGRAPHY NECK: CPT

## 2025-02-21 PROCEDURE — 70496 CT ANGIOGRAPHY HEAD: CPT

## 2025-02-21 PROCEDURE — 93925 LOWER EXTREMITY STUDY: CPT | Performed by: SURGERY

## 2025-02-21 PROCEDURE — 93925 LOWER EXTREMITY STUDY: CPT

## 2025-02-21 RX ADMIN — IOHEXOL 85 ML: 350 INJECTION, SOLUTION INTRAVENOUS at 09:14

## 2025-03-03 ENCOUNTER — OFFICE VISIT (OUTPATIENT)
Dept: VASCULAR SURGERY | Facility: CLINIC | Age: 85
End: 2025-03-03
Payer: COMMERCIAL

## 2025-03-03 ENCOUNTER — TELEPHONE (OUTPATIENT)
Dept: VASCULAR SURGERY | Facility: CLINIC | Age: 85
End: 2025-03-03

## 2025-03-03 VITALS
SYSTOLIC BLOOD PRESSURE: 140 MMHG | OXYGEN SATURATION: 96 % | DIASTOLIC BLOOD PRESSURE: 84 MMHG | WEIGHT: 157.6 LBS | HEART RATE: 71 BPM | BODY MASS INDEX: 22.56 KG/M2 | HEIGHT: 70 IN

## 2025-03-03 DIAGNOSIS — F17.219 CIGARETTE NICOTINE DEPENDENCE WITH NICOTINE-INDUCED DISORDER: ICD-10-CM

## 2025-03-03 DIAGNOSIS — I73.9 CLAUDICATION (HCC): Primary | ICD-10-CM

## 2025-03-03 DIAGNOSIS — I63.521 ACUTE ISCHEMIC MULTIFOCAL RIGHT-SIDED ANTERIOR CIRCULATION STROKE (HCC): ICD-10-CM

## 2025-03-03 DIAGNOSIS — I65.22 ASYMPTOMATIC STENOSIS OF LEFT CAROTID ARTERY: ICD-10-CM

## 2025-03-03 PROCEDURE — 99214 OFFICE O/P EST MOD 30 MIN: CPT | Performed by: SURGERY

## 2025-03-03 RX ORDER — LOSARTAN POTASSIUM 100 MG/1
25 TABLET ORAL DAILY
COMMUNITY

## 2025-03-03 NOTE — ASSESSMENT & PLAN NOTE
"Joe returns to the office to review CTA of the head and neck as well as lower extremity arterial duplex.  He has history of right TCAR for symptomatic disease.  He has known asymptomatic high-grade left carotid stenosis.  At this time do recommend left TCAR.  The procedure, risk, benefits, alternatives, and anticipated postoperative course were discussed in detail.  Written consent was obtained.  At this time patient verbalizes that he had received a phone call from the hospital stating that the procedure was \"not covered\".  I am not certain of the surrounding circumstances/details as I have personally not seen/discussed the nature of the procedure until today.  I will have my office look into this.  Patient and wife stated that if procedure is covered under insurance that they would be amenable to proceeding.  Having said that they recently have purchased a home and are closing in mid April.  They have expressed that they wish to defer surgery till early May.  He should continue on his aspirin Plavix and atorvastatin regardless of whether he wishes to proceed with surgery or not.  He understands the risk of stroke.  The signs and symptoms of stroke were reviewed.  Should he experience any such symptoms he should proceed to the nearest emergency department.         "

## 2025-03-03 NOTE — TELEPHONE ENCOUNTER
Called Mago to set PT up for AOIL and LEAD. I didn't realize that there were two other orders while they were here. They've been scheduled for 9/16/25. - STALIN

## 2025-03-03 NOTE — ASSESSMENT & PLAN NOTE
Lower extremity arterial duplex reviewed with patient.  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  The left GUS 0.86 with external iliac high-grade stenosis as well as SFA occlusion.  We discussed the importance of smoking cessation.  Patient at this time is not interested in quitting.  Recommend continued surveillance with 6-month duplex.    Orders:    VAS ARTERIAL DUPLEX- LOWER LIMB BILATERAL; Future    VAS abdominal aorta/iliac duplex; Future

## 2025-03-03 NOTE — TELEPHONE ENCOUNTER
PT needs Left TCAR. Dr Kendrick, PT, and PT's wife said that a [facility] called to say that the procedure wasn't covered. Wife, Mago, believes that they mentioned [address].    I reached out to Any to ask if she or someone could reach out to the insurance to see if the procedure would be covered. She informed me that normally they wouldn't find out about coverage until there is a Date and Time set, but that she would reach out to her insurance contact to see what's going on.    She had me inform them that someone from surgery scheduling would reach out to them to let them know about the situation. In the meantime I gave PT surgery folder and soap to be prepared so that provided that he does schedule, he's ready to go.    *Please contact Mago with any information. Her number is [phone].*

## 2025-03-03 NOTE — TELEPHONE ENCOUNTER
REMINDER: Under Reason For Call, comments MUST be formatted as:   (Surgeon's Initials) / (Procedure)      Special Instructions / FYI: Left TCAR provided by insurance coverage. Call Mago (wife) - [phone] with news of coverage.    Clearances: NA    Consent: I certify that patient has signed, printed, timed, and dated their surgery consent.  I certify that the patient's LEGAL NAME and DATE OF BIRTH are written in the upper left corner on BOTH sides of the consent.  I certify that BOTH sides of the completed surgery consent have been scanned into the patient's Epic chart by myself on 3/3/2025.  Yes, I have LABELED the consent in Epic as Consent for Vascular Procedure.     For Surgical Clearances     Levels   1-3   ROUTE this encounter to [facility] Surgery Coordinator Pool     Level   4   ROUTE this encounter to [facility] Surgery Coordinator Pool       HYDRATION CLEARANCES   ONLY ROUTE TO  [facility] Surgery Coordinator Pool       Yes, I have ROUTED this encounter to [facility] Surgery Coordinator.

## 2025-03-03 NOTE — PROGRESS NOTES
"Name: Joe Aviles      : 1940      MRN: [id number]  Encounter Provider: Sven Barajas DO  Encounter Date: 3/3/2025   Encounter department: [facility] [place]  :  Assessment & Plan  Asymptomatic stenosis of left carotid artery  Joe returns to the office to review CTA of the head and neck as well as lower extremity arterial duplex.  He has history of right TCAR for symptomatic disease.  He has known asymptomatic high-grade left carotid stenosis.  At this time do recommend left TCAR.  The procedure, risk, benefits, alternatives, and anticipated postoperative course were discussed in detail.  Written consent was obtained.  At this time patient verbalizes that he had received a phone call from the hospital stating that the procedure was \"not covered\".  I am not certain of the surrounding circumstances/details as I have personally not seen/discussed the nature of the procedure until today.  I will have my office look into this.  Patient and wife stated that if procedure is covered under insurance that they would be amenable to proceeding.  Having said that they recently have purchased a home and are closing in mid April.  They have expressed that they wish to defer surgery till early May.  He should continue on his aspirin Plavix and atorvastatin regardless of whether he wishes to proceed with surgery or not.  He understands the risk of stroke.  The signs and symptoms of stroke were reviewed.  Should he experience any such symptoms he should proceed to the nearest emergency department.         Acute ischemic multifocal right-sided anterior circulation stroke (HCC)         Cigarette nicotine dependence with nicotine-induced disorder         Claudication (HCC)  Lower extremity arterial duplex reviewed with patient.  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  The left GUS 0.86 with external iliac high-grade stenosis as well as SFA occlusion.  We discussed the importance of " smoking cessation.  Patient at this time is not interested in quitting.  Recommend continued surveillance with 6-month duplex.    Orders:    VAS ARTERIAL DUPLEX- LOWER LIMB BILATERAL; Future    VAS abdominal aorta/iliac duplex; Future            Patient presents today for review of results. Patient has hx. Of carotid stenosis, and PVD. Patient had CTA and LEAD done 02/21/2025. Patient c/o occasional headaches and left shoulder to wrist numbness. Patient denies sudden loss of vision, slurred speech, and trouble swallowing. Patient is taking plavix 75MG, ASA 81MG, and Atorvastatin 40MG. Patient is a current smoker.   History of Present Illness   HPI  Joe Aviles is a 84 y.o. male who presents to the office to review CTA of the head and neck as well as lower extremity arterial duplex.    History obtained from: patient and patient's Significant Other    Review of Systems   Constitutional: Negative.    HENT: Negative.     Eyes: Negative.    Respiratory: Negative.          SOB - Daily      Cardiovascular: Negative.    Gastrointestinal: Negative.    Endocrine: Negative.    Genitourinary: Negative.    Musculoskeletal: Negative.    Skin: Negative.    Allergic/Immunologic: Negative.    Neurological: Negative.         Dizziness - Constant    Hematological: Negative.    Psychiatric/Behavioral: Negative.       Past Medical History   Past Medical History:   Diagnosis Date    BPH (benign prostatic hyperplasia)     Colon polyp     Dementia (HCC)     Diverticulitis     Hypertension     Macular degeneration     Stroke (HCC)      Past Surgical History:   Procedure Laterality Date    COLONOSCOPY      CORONARY ANGIOPLASTY WITH STENT PLACEMENT      CT CYSTOGRAM  1/14/2022    CYSTOSCOPY      CT TCAT IV STENT CRV CRTD ART EMBOLIC PROTECJ Right 05/22/2024    Procedure: ANGIOPLASTY ARTERY CAROTID W/ STENT;  Surgeon: Sven Barajas DO;  Location: [facility];  Service: Vascular    PROSTATE SURGERY      TONSILLECTOMY      VASECTOMY        Family History   Problem Relation Age of Onset    Brain cancer Mother     Cancer Brother         Bladder    Brain cancer Child       reports that he has been smoking cigarettes. He started smoking about 68 years ago. He has a 34.1 pack-year smoking history. He has never used smokeless tobacco. He reports that he does not currently use alcohol. He reports that he does not use drugs.  Current Outpatient Medications   Medication Instructions    acetaminophen (TYLENOL) 650 mg, Oral, Every 6 hours PRN    amLODIPine (NORVASC) 5 mg, Daily    aspirin 81 mg, Oral, Daily    atorvastatin (LIPITOR) 40 mg, Oral, Every evening    clopidogrel (PLAVIX) 75 mg, Oral, Daily    donepezil (ARICEPT) 5 mg, Oral, Daily at bedtime    losartan (COZAAR) 25 mg, Daily    memantine (NAMENDA) 10 mg, Daily    Multiple Vitamins-Minerals (OCUVITE PO) 1 tablet, Daily    nicotine (NICODERM CQ) 7 mg/24hr TD 24 hr patch 1 patch, Transdermal, Daily    propranolol (INDERAL) 20 mg, 3 times daily    tamsulosin (FLOMAX) 0.4 mg, Daily with dinner   No Known Allergies   Current Outpatient Medications on File Prior to Visit   Medication Sig Dispense Refill    acetaminophen (TYLENOL) 325 mg tablet Take 2 tablets (650 mg total) by mouth every 6 (six) hours as needed for mild pain      amLODIPine (NORVASC) 5 mg tablet Take 5 mg by mouth daily      aspirin 81 mg chewable tablet Chew 1 tablet (81 mg total) daily 30 tablet 0    atorvastatin (LIPITOR) 40 mg tablet Take 1 tablet (40 mg total) by mouth every evening 30 tablet 3    clopidogrel (PLAVIX) 75 mg tablet Take 1 tablet (75 mg total) by mouth daily 90 tablet 2    donepezil (ARICEPT) 5 mg tablet Take 5 mg by mouth daily at bedtime      losartan (COZAAR) 100 MG tablet Take 25 mg by mouth daily      memantine (NAMENDA) 10 mg tablet Take 10 mg by mouth daily      Multiple Vitamins-Minerals (OCUVITE PO) Take 1 tablet by mouth daily      propranolol (INDERAL) 20 mg tablet Take 20 mg by mouth 3 (three) times a day   "    nicotine (NICODERM CQ) 7 mg/24hr TD 24 hr patch Place 1 patch on the skin over 24 hours daily (Patient not taking: Reported on 8/13/2024) 28 patch 0    tamsulosin (FLOMAX) 0.4 mg Take 0.4 mg by mouth daily with dinner (Patient not taking: Reported on 3/3/2025)      [DISCONTINUED] bisacodyl (DULCOLAX) 5 mg EC tablet Take 2 tablets (10 mg total) by mouth once for 1 dose 2 tablet 0    [DISCONTINUED] Multiple Vitamin (MULTIVITAMIN) capsule Take 1 capsule by mouth daily (Patient not taking: Reported on 8/13/2024)       No current facility-administered medications on file prior to visit.      Social History     Tobacco Use    Smoking status: Every Day     Current packs/day: 0.50     Average packs/day: 0.5 packs/day for 68.2 years (34.1 ttl pk-yrs)     Types: Cigarettes     Start date: 1957    Smokeless tobacco: Never   Vaping Use    Vaping status: Never Used   Substance and Sexual Activity    Alcohol use: Not Currently    Drug use: No    Sexual activity: Not on file        Objective   /84   Pulse 71   Ht 5' 10\" (1.778 m)   Wt 71.5 kg (157 lb 9.6 oz)   SpO2 96%   BMI 22.61 kg/m²      Physical Exam  Constitutional:       General: He is not in acute distress.     Appearance: He is well-developed.   HENT:      Head: Normocephalic and atraumatic.   Eyes:      General: No scleral icterus.     Conjunctiva/sclera: Conjunctivae normal.   Neck:      Trachea: No tracheal deviation.   Cardiovascular:      Rate and Rhythm: Normal rate and regular rhythm.      Pulses:           Radial pulses are 1+ on the right side and 1+ on the left side.        Femoral pulses are 1+ on the right side and 1+ on the left side.       Popliteal pulses are 0 on the right side and 0 on the left side.        Posterior tibial pulses are detected w/ Doppler on the right side and detected w/ Doppler on the left side.      Heart sounds: Normal heart sounds.   Pulmonary:      Effort: Pulmonary effort is normal.      Breath sounds: Normal breath " sounds.   Abdominal:      General: There is no distension.      Palpations: Abdomen is soft. There is no mass (no appreciable aortic pulsation/aneurysm).      Tenderness: There is no abdominal tenderness. There is no guarding or rebound.   Musculoskeletal:         General: Normal range of motion.      Cervical back: Normal range of motion and neck supple.   Skin:     General: Skin is warm and dry.   Neurological:      Mental Status: He is alert and oriented to person, place, and time.   Psychiatric:         Behavior: Behavior normal.     CTA head and neck:  CT Brain:  - No acute intracranial abnormality.  - Mild chronic microangiopathic changes, unchanged.  -Small chronic cortical and deep cerebral gray and white matter lacunar infarctions as seen on the MRI study from 5/13/2024.     CT Angiography:  1.  Status post right carotid stent placement with mild residual mid stent stenosis. This is primarily due to extrinsic compression from the calcified plaque in the carotid bulb.  2.  Stable severe stenosis in the left carotid bulb due to predominantly noncalcified atherosclerotic disease. Additional mild tandem stenosis at the origin.  3.  Stable severe origin stenosis of the dominant left vertebral artery.  4.  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  No large vessel occlusion, high-grade stenosis or aneurysm in the intracranial vasculature.    Administrative Statements   I have spent a total time of 30 minutes in caring for this patient on the day of the visit/encounter including Diagnostic results, Prognosis, Risks and benefits of tx options, Instructions for management, Patient and family education, Importance of tx compliance, Risk factor reductions, Impressions, Counseling / Coordination of care, Documenting in the medical record, Reviewing/placing orders in the medical record (including tests, medications, and/or procedures), and Obtaining or reviewing  history  .

## 2025-03-14 ENCOUNTER — PREP FOR PROCEDURE (OUTPATIENT)
Dept: VASCULAR SURGERY | Facility: CLINIC | Age: 85
End: 2025-03-14

## 2025-03-14 DIAGNOSIS — I65.22 ASYMPTOMATIC STENOSIS OF LEFT CAROTID ARTERY: Primary | ICD-10-CM

## 2025-03-14 NOTE — TELEPHONE ENCOUNTER
Verified patient's insurance   CONFIRMED - Patient's insurance is [employer]  Is patient requesting a call when authorization has been obtained? Patient did not request a call.    Surgery Date: 5-7-25  Primary Surgeon: ADAN // Sven Kendrick (NPI: [id number])  Assisting Surgeon: Not Applicable (N/A)  Facility: [facility] (Tax: [id number] / NPI: [id number])  Inpatient / Outpatient: Inpatient  Level: 3    Clearance Received: No clearance ordered.  Consent Received: Yes, scanned into Epic on 3-3-25.  Medication Hold / Last Dose: Not Applicable (N/A)  IR Notified:  Yes  Rep. Notified:  [place]  Equipment Needs: Not Applicable (N/A)  Vas Lab Requested: Not Applicable (N/A)  Patient Contacted: 3-14-25    Diagnosis: I65.22  Procedure/ CPT Code(s): (TCAR)  Transcarotid Artery Revascularization // CPT: 47013    For varicose vein related procedures:   Last LEVDR: Not Applicable (N/A)  CEAP Classification: Not Applicable (N/A)  VCSS: Not Applicable (N/A)    Post Operative Date/ Time: TBD     *Please review medication hold(s), PATs, and check H&P with patient.*  PATIENT WAS MAILED SURGERY/SHOWERING/DISCHARGE/COVID INSTRUCTIONS AFTER REVIEWING WITH THEM VIA PHONE CALL.      Spoke to [facility] to schedule surgery and will also send instruction in mail

## 2025-03-26 ENCOUNTER — TELEPHONE (OUTPATIENT)
Dept: ANESTHESIOLOGY | Facility: CLINIC | Age: 85
End: 2025-03-26

## 2025-04-03 ENCOUNTER — HOSPITAL ENCOUNTER (OUTPATIENT)
Dept: NON INVASIVE DIAGNOSTICS | Facility: HOSPITAL | Age: 85
Discharge: HOME | End: 2025-04-03
Attending: SURGERY
Payer: COMMERCIAL

## 2025-04-03 ENCOUNTER — APPOINTMENT (OUTPATIENT)
Dept: LAB | Facility: HOSPITAL | Age: 85
End: 2025-04-03
Payer: COMMERCIAL

## 2025-04-03 ENCOUNTER — LAB REQUISITION (OUTPATIENT)
Dept: LAB | Facility: HOSPITAL | Age: 85
End: 2025-04-03
Payer: COMMERCIAL

## 2025-04-03 DIAGNOSIS — I65.22 ASYMPTOMATIC STENOSIS OF LEFT CAROTID ARTERY: ICD-10-CM

## 2025-04-03 DIAGNOSIS — I65.23 BILATERAL CAROTID ARTERY STENOSIS: ICD-10-CM

## 2025-04-03 DIAGNOSIS — I65.22 OCCLUSION AND STENOSIS OF LEFT CAROTID ARTERY: ICD-10-CM

## 2025-04-03 LAB
ABO GROUP BLD: NORMAL
ANION GAP SERPL CALCULATED.3IONS-SCNC: 5 MMOL/L (ref 4–13)
BLD GP AB SCN SERPL QL: NEGATIVE
BUN SERPL-MCNC: 13 MG/DL (ref 5–25)
CALCIUM SERPL-MCNC: 9 MG/DL (ref 8.4–10.2)
CHLORIDE SERPL-SCNC: 110 MMOL/L (ref 96–108)
CO2 SERPL-SCNC: 30 MMOL/L (ref 21–32)
CREAT SERPL-MCNC: 0.89 MG/DL (ref 0.6–1.3)
ERYTHROCYTE [DISTWIDTH] IN BLOOD BY AUTOMATED COUNT: 13.8 % (ref 11.6–15.1)
GFR SERPL CREATININE-BSD FRML MDRD: 79 ML/MIN/1.73SQ M
GLUCOSE SERPL-MCNC: 150 MG/DL (ref 65–140)
HCT VFR BLD AUTO: 45.2 % (ref 36.5–49.3)
HGB BLD-MCNC: 14.8 G/DL (ref 12–17)
INR PPP: 1.05 (ref 0.85–1.19)
MCH RBC QN AUTO: 31.2 PG (ref 26.8–34.3)
MCHC RBC AUTO-ENTMCNC: 32.7 G/DL (ref 31.4–37.4)
MCV RBC AUTO: 95 FL (ref 82–98)
PLATELET # BLD AUTO: 156 THOUSANDS/UL (ref 149–390)
PMV BLD AUTO: 12 FL (ref 8.9–12.7)
POTASSIUM SERPL-SCNC: 3.8 MMOL/L (ref 3.5–5.3)
PROTHROMBIN TIME: 14.1 SECONDS (ref 12.3–15)
RBC # BLD AUTO: 4.75 MILLION/UL (ref 3.88–5.62)
RH BLD: POSITIVE
SODIUM SERPL-SCNC: 145 MMOL/L (ref 135–147)
SPECIMEN EXPIRATION DATE: NORMAL
WBC # BLD AUTO: 7.65 THOUSAND/UL (ref 4.31–10.16)

## 2025-04-03 PROCEDURE — 93880 EXTRACRANIAL BILAT STUDY: CPT

## 2025-04-03 PROCEDURE — 93880 EXTRACRANIAL BILAT STUDY: CPT | Performed by: SURGERY

## 2025-04-03 PROCEDURE — 80048 BASIC METABOLIC PNL TOTAL CA: CPT

## 2025-04-03 PROCEDURE — 85027 COMPLETE CBC AUTOMATED: CPT

## 2025-04-03 PROCEDURE — 86901 BLOOD TYPING SEROLOGIC RH(D): CPT | Performed by: SURGERY

## 2025-04-03 PROCEDURE — 86850 RBC ANTIBODY SCREEN: CPT | Performed by: SURGERY

## 2025-04-03 PROCEDURE — 85610 PROTHROMBIN TIME: CPT

## 2025-04-03 PROCEDURE — 86900 BLOOD TYPING SEROLOGIC ABO: CPT | Performed by: SURGERY

## 2025-04-03 PROCEDURE — 36415 COLL VENOUS BLD VENIPUNCTURE: CPT

## 2025-04-09 ENCOUNTER — TELEPHONE (OUTPATIENT)
Dept: ANESTHESIOLOGY | Facility: CLINIC | Age: 85
End: 2025-04-09

## 2025-04-09 DIAGNOSIS — Z01.89 ENCOUNTER FOR GERIATRIC ASSESSMENT: Primary | ICD-10-CM

## 2025-04-14 ENCOUNTER — ANESTHESIA EVENT (OUTPATIENT)
Dept: PERIOP | Facility: HOSPITAL | Age: 85
DRG: 035 | End: 2025-04-14
Payer: COMMERCIAL

## 2025-04-15 ENCOUNTER — TELEMEDICINE (OUTPATIENT)
Dept: ANESTHESIOLOGY | Facility: CLINIC | Age: 85
End: 2025-04-15
Payer: COMMERCIAL

## 2025-04-15 VITALS — SYSTOLIC BLOOD PRESSURE: 152 MMHG | DIASTOLIC BLOOD PRESSURE: 85 MMHG | HEART RATE: 71 BPM

## 2025-04-15 DIAGNOSIS — F17.219 CIGARETTE NICOTINE DEPENDENCE WITH NICOTINE-INDUCED DISORDER: ICD-10-CM

## 2025-04-15 DIAGNOSIS — I10 ESSENTIAL HYPERTENSION: ICD-10-CM

## 2025-04-15 DIAGNOSIS — F03.90 DEMENTIA, UNSPECIFIED DEMENTIA SEVERITY, UNSPECIFIED DEMENTIA TYPE, UNSPECIFIED WHETHER BEHAVIORAL, PSYCHOTIC, OR MOOD DISTURBANCE OR ANXIETY (HCC): ICD-10-CM

## 2025-04-15 DIAGNOSIS — I65.23 BILATERAL CAROTID ARTERY STENOSIS: ICD-10-CM

## 2025-04-15 DIAGNOSIS — Z01.89 ENCOUNTER FOR GERIATRIC ASSESSMENT: Primary | ICD-10-CM

## 2025-04-15 PROCEDURE — 99212 OFFICE O/P EST SF 10 MIN: CPT | Performed by: NURSE PRACTITIONER

## 2025-04-15 NOTE — ASSESSMENT & PLAN NOTE
SEE FULL GERIATRIC ASSESSMENT  HIGH RISK GERIATRIC 2.2 PMH DEMENTIA   NO COGNITIVE CONCERNS ON MY VERBAL EXAM TODAY   SELF CARE PATIENT AT HOME   WIFE IS SUPPORT

## 2025-04-15 NOTE — PROGRESS NOTES
"Soc Consult   GERIATRIC SURGERY      Brief Visit  Name: Joe Aviles      : 1940      MRN: [id number]  Encounter Provider: RODIRGUEZ Ni  Encounter Date: 4/15/2025   Encounter department: [facility]    ASSESSMENT   84   year old male referred to SOC for pre-surgery geriatric screening 2.2 age   Other consult concerns include:  surgical optimization & BEST program, ADVANCED AGE   He is scheduled on     Case: [id number] Date/Time: 25 0800   Procedure: ANGIOPLASTY ARTERY CAROTID W/ STENT (Left: Neck)   Anesthesia type: Choice   Diagnosis: Asymptomatic carotid artery stenosis, left [I65.22]   Pre-op diagnosis: Asymptomatic carotid artery stenosis, left [I65.22]   Location: [place] [address] / [facility]   Surgeons: Sven Barajas DO       LAST ANESTHESIA   Patient reports no past problems with anesthesia     Assessment & Plan  Bilateral carotid artery stenosis  Per patient monitoring stenosis for awhile   PMH: 7 ministrokes   Electing to have CEA  Seen for SO   Seen for his geriatric assessment   Started BEST   ALEJANDRINA RISK- LOW  SSI RISK- HIGH 2.2 SMOKER   HIGH RISK GERIATRIC 2.2 ADVANCED AGE   Offered tobacco therapy - patient refused at this time \"not ready\"     Cigarette nicotine dependence with nicotine-induced disorder  Per patient he has been smoking for 60 years   Not interested in quttinhg   Right now his life is very stressful with Moving - \"not right now\"- per patient   1/2 pack a daily   Education on the benefits of quitting smoking for your surgery and recovery was reviewed today    Essential hypertension      4/15/2025     11:39 AM      Blood Pressure 152/85   Pulse 71     Encounter for geriatric assessment  See full geriatric assessment below   recommend inpatient geriatric consult after surgery- this consult should not delay discharge  Recommend inpatient geriatric pain protocol recommend inpatient delirium " precautions          Orders:    Ambulatory Referral to Surgical Optimization    Dementia, unspecified dementia severity, unspecified dementia type, unspecified whether behavioral, psychotic, or mood disturbance or anxiety (HCC)  SEE FULL GERIATRIC ASSESSMENT  HIGH RISK GERIATRIC 2.2 PMH DEMENTIA   NO COGNITIVE CONCERNS ON MY VERBAL EXAM TODAY   SELF CARE PATIENT AT HOME   WIFE IS SUPPORT                History of Present Illness   HPI    JOE is an 84-year-old male who was referred to surgical optimization for surgery preparation.  Other consult concerns include advanced age and tobacco therapy.  JOE explains he has had 7 mini strokes in the past and now electing to have a CEA.  I spoke with Joe over the telephone.  We did not connect via video.  He was offered a live visit and declined.      He admits to being in well health today.  Denies any new developments in his health.  Denies chest pain and admits to chronic shortness of breath from COPD and smoking.  SOB is unchanged.    Past medical history reviewed is stable.    He is an active smoker does not wish to quit today.  States he is just not ready.  I provided education on the benefits of quitting smoking for your surgery and recovery.  Patient is aware.  He was instructed to call us if he changes his mind.    Surgical optimization complete.  Geriatric assessment complete.  Recommend inpatient geriatric consult after surgery.  This consult do not delay discharge.  Recommend geriatric pain protocol.  Recommend delirium precautions on admit.        Administrative Statements   Encounter provider RODRIGUEZ Ni    The Patient is located at Home and in the following state in which I hold an active license [place].    The patient was identified by name and date of birth. Joe GUSMAN Stefan was informed that this is a telemedicine visit and that the visit is being conducted through the Epic Embedded platform. He agrees to proceed..  My office door was closed. No  one else was in the room.  He acknowledged consent and understanding of privacy and security of the video platform. The patient has agreed to participate and understands they can discontinue the visit at any time.    I have spent a total time of 20 minutes in caring for this patient on the day of the visit/encounter including Prognosis, Risks and benefits of tx options, Instructions for management, Patient and family education, and Importance of tx compliance, not including the time spent for establishing the audio/video connection.

## 2025-04-15 NOTE — PROGRESS NOTES
[facility] ([facility])  CONSULT       Patient has the ability to take their vital signs at home YES  Allergies reviewed today   PMH reviewed today   Medications reviewed today     See Geriatric Assessment below...  Falls (last 6 months): NO  Brayden Total Score: 18  PHQ- 9 Depression Scale: 1  Nutrition Assessment Score: 8  METS: 6.79  DX SLEEP APNEA; NO:SCORE 6  Health goals:  -What are your overall health goals?     -What brings you strength? WIFE    -What activities are important to you? WOODWORKING     As always we discussed having your BEST surgery, and BEST recovery.  Surgery goals reviewed today.      Breathing exercises   Patient was encouraged to begin lung exercises today.  This could be accomplished through deep breathing and cough exercises.     Eating/nutrition   Encouraged patient to increase oral protein intake prior to surgery.  This can be accomplished by consuming chicken, fish, tuna fish, cottage cheese, cheese, eggs, Greek yogurt, and protein shakes as needed.  I encouraged use of protein shakes such ENLIVE.  I also recommended making your own protein shakes with protein powder.   Sleep/Stress management  Patient was encouraged to rest their body prior to surgery.  Encouraged attempting to get 8 hours of sleep at night.  Avoid stress.  Avoid sick contacts.  Encouraged to find a relaxing hobby such as reading, meditation, listening to music.  Training exercises  Patient was encouraged to remain active as possible.  Today bilateral lower extremity generic exercises were taught for muscle strengthening and balance.  All exercises to be done sitting down.

## 2025-04-15 NOTE — ASSESSMENT & PLAN NOTE
"Per patient he has been smoking for 60 years   Not interested in quttinhg   Right now his life is very stressful with Moving - \"not right now\"- per patient   1/2 pack a daily   Education on the benefits of quitting smoking for your surgery and recovery was reviewed today    "

## 2025-04-15 NOTE — ASSESSMENT & PLAN NOTE
See full geriatric assessment below   recommend inpatient geriatric consult after surgery- this consult should not delay discharge  Recommend inpatient geriatric pain protocol recommend inpatient delirium precautions          Orders:    Ambulatory Referral to Surgical Optimization

## 2025-04-15 NOTE — ASSESSMENT & PLAN NOTE
"Per patient monitoring stenosis for awhile   PMH: 7 ministrokes   Electing to have CEA  Seen for SO   Seen for his geriatric assessment   Started BEST   ALEJANDRINA RISK- LOW  SSI RISK- HIGH 2.2 SMOKER   HIGH RISK GERIATRIC 2.2 ADVANCED AGE   Offered tobacco therapy - patient refused at this time \"not ready\"     "

## 2025-04-28 RX ORDER — BUPROPION HYDROCHLORIDE 150 MG/1
150 TABLET ORAL EVERY MORNING
COMMUNITY
Start: 2025-03-20

## 2025-04-28 NOTE — PRE-PROCEDURE INSTRUCTIONS
Pre-Surgery Instructions:   Medication Instructions    acetaminophen (TYLENOL) 325 mg tablet Uses PRN- OK to take day of surgery    amLODIPine (NORVASC) 5 mg tablet Take day of surgery.    aspirin 81 mg chewable tablet Take night before surgery    atorvastatin (LIPITOR) 40 mg tablet Take night before surgery    clopidogrel (PLAVIX) 75 mg tablet Take day of surgery.    donepezil (ARICEPT) 5 mg tablet Take night before surgery    losartan (COZAAR) 100 MG tablet Hold day of surgery.    Multiple Vitamins-Minerals (OCUVITE PO) Stop taking 7 days prior to surgery.if possible    propranolol (INDERAL) 20 mg tablet Take day of surgery.    Medication instructions for day of surgery reviewed. Please take all instructed medications with only a sip of water.       You will receive a call one business day prior to surgery with an arrival time and hospital directions. If your surgery is scheduled on a Monday, the hospital will be calling you on the Friday prior to your surgery. If you have not heard from anyone by 8pm, please call the hospital supervisor through the hospital  at [phone]. ([place] [phone] or [place] [phone]).    Do not eat or drink anything after midnight the night before your surgery, including candy, mints, lifesavers, or chewing gum. Do not drink alcohol 24hrs before your surgery. Try not to smoke at least 24hrs before your surgery.       Follow the pre surgery showering instructions as listed in the “My Surgical Experience Booklet” or otherwise provided by your surgeon's office. Do not use a blade to shave the surgical area 1 week before surgery. It is okay to use a clean electric clippers up to 24 hours before surgery. Do not apply any lotions, creams, including makeup, cologne, deodorant, or perfumes after showering on the day of your surgery. Do not use dry shampoo, hair spray, hair gel, or any type of hair products.     No contact lenses, eye make-up, or artificial eyelashes. Remove  nail polish, including gel polish, and any artificial, gel, or acrylic nails if possible. Remove all jewelry including rings and body piercing jewelry.     Wear causal clothing that is easy to take on and off. Consider your type of surgery.    Keep any valuables, jewelry, piercings at home. Please bring any specially ordered equipment (sling, braces) if indicated.    Arrange for a responsible person to drive you to and from the hospital on the day of your surgery. Please confirm the visitor policy for the day of your procedure when you receive your phone call with an arrival time.     Call the surgeon's office with any new illnesses, exposures, or additional questions prior to surgery.    Please reference your “My Surgical Experience Booklet” for additional information to prepare for your upcoming surgery.     See Geriatric Assessment below...  Cognitive Assessment: NA  CAM: NA  TUG <15 sec: NA  Falls (last 6 months): no  Hand  score: NA  -Attempt 1: NA  -Attempt 2: NA  -Attempt 3: NA  Brayden Total Score: 22  PHQ- 9 Depression Scale: 0  Nutrition Assessment Score: 11  METS: 8  Incentive Spirometry Level:  NA  Health goals:  -What are your overall health goals? (quit smoking, wt. loss, rest, decrease stress)  Quit smoking!,    -What brings you strength? (family, friends, Jewish, health)  Scientologist group  -What activities are important to you? (exercise, reading, travel, work)              Judaism activities, crafts

## 2025-05-07 ENCOUNTER — ANESTHESIA (OUTPATIENT)
Dept: PERIOP | Facility: HOSPITAL | Age: 85
DRG: 035 | End: 2025-05-07
Payer: COMMERCIAL

## 2025-05-07 ENCOUNTER — APPOINTMENT (OUTPATIENT)
Dept: RADIOLOGY | Facility: HOSPITAL | Age: 85
DRG: 035 | End: 2025-05-07
Payer: COMMERCIAL

## 2025-05-07 ENCOUNTER — HOSPITAL ENCOUNTER (INPATIENT)
Facility: HOSPITAL | Age: 85
LOS: 1 days | Discharge: HOME | DRG: 035 | End: 2025-05-08
Attending: SURGERY | Admitting: SURGERY
Payer: COMMERCIAL

## 2025-05-07 DIAGNOSIS — I65.22 CAROTID ARTERY STENOSIS, UNILATERAL, LEFT: ICD-10-CM

## 2025-05-07 DIAGNOSIS — Z01.89 ENCOUNTER FOR GERIATRIC ASSESSMENT: Primary | ICD-10-CM

## 2025-05-07 LAB
ABO GROUP BLD: NORMAL
BLD GP AB SCN SERPL QL: NEGATIVE
KCT BLD-ACNC: 125 SEC (ref 89–137)
KCT BLD-ACNC: 312 SEC (ref 89–137)
RH BLD: POSITIVE
SPECIMEN EXPIRATION DATE: NORMAL
SPECIMEN SOURCE: ABNORMAL
SPECIMEN SOURCE: NORMAL

## 2025-05-07 PROCEDURE — 4A133B1 MONITORING OF ARTERIAL PRESSURE, PERIPHERAL, PERCUTANEOUS APPROACH: ICD-10-PCS | Performed by: ANESTHESIOLOGY

## 2025-05-07 PROCEDURE — 86901 BLOOD TYPING SEROLOGIC RH(D): CPT | Performed by: SURGERY

## 2025-05-07 PROCEDURE — 86850 RBC ANTIBODY SCREEN: CPT | Performed by: SURGERY

## 2025-05-07 PROCEDURE — NC001 PR NO CHARGE: Performed by: SURGERY

## 2025-05-07 PROCEDURE — 03HY32Z INSERTION OF MONITORING DEVICE INTO UPPER ARTERY, PERCUTANEOUS APPROACH: ICD-10-PCS | Performed by: ANESTHESIOLOGY

## 2025-05-07 PROCEDURE — C1876 STENT, NON-COA/NON-COV W/DEL: HCPCS | Performed by: SURGERY

## 2025-05-07 PROCEDURE — 86900 BLOOD TYPING SEROLOGIC ABO: CPT | Performed by: SURGERY

## 2025-05-07 PROCEDURE — 36217 PLACE CATHETER IN ARTERY: CPT

## 2025-05-07 PROCEDURE — C1894 INTRO/SHEATH, NON-LASER: HCPCS | Performed by: SURGERY

## 2025-05-07 PROCEDURE — 85347 COAGULATION TIME ACTIVATED: CPT

## 2025-05-07 PROCEDURE — 76000 FLUOROSCOPY <1 HR PHYS/QHP: CPT

## 2025-05-07 PROCEDURE — 37215 TRANSCATH STENT CCA W/EPS: CPT | Performed by: SURGERY

## 2025-05-07 PROCEDURE — X2AJ336 CEREBRAL EMBOLIC FILTRATION, EXTRACORPOREAL FLOW REVERSAL CIRCUIT FROM LEFT COMMON CAROTID ARTERY, PERCUTANEOUS APPROACH, NEW TECHNOLOGY GROUP 6: ICD-10-PCS | Performed by: SURGERY

## 2025-05-07 PROCEDURE — C1769 GUIDE WIRE: HCPCS | Performed by: SURGERY

## 2025-05-07 PROCEDURE — C1725 CATH, TRANSLUMIN NON-LASER: HCPCS | Performed by: SURGERY

## 2025-05-07 PROCEDURE — 99223 1ST HOSP IP/OBS HIGH 75: CPT

## 2025-05-07 PROCEDURE — 4A133J1 MONITORING OF ARTERIAL PULSE, PERIPHERAL, PERCUTANEOUS APPROACH: ICD-10-PCS | Performed by: ANESTHESIOLOGY

## 2025-05-07 PROCEDURE — 037L3DZ DILATION OF LEFT INTERNAL CAROTID ARTERY WITH INTRALUMINAL DEVICE, PERCUTANEOUS APPROACH: ICD-10-PCS | Performed by: SURGERY

## 2025-05-07 PROCEDURE — 99222 1ST HOSP IP/OBS MODERATE 55: CPT | Performed by: INTERNAL MEDICINE

## 2025-05-07 DEVICE — 9 MM X 40 MM
Type: IMPLANTABLE DEVICE | Site: CAROTID | Status: FUNCTIONAL
Brand: ENROUTE TRANSCAROTID STENT

## 2025-05-07 RX ORDER — ROCURONIUM BROMIDE 10 MG/ML
INJECTION, SOLUTION INTRAVENOUS AS NEEDED
Status: DISCONTINUED | OUTPATIENT
Start: 2025-05-07 | End: 2025-05-07

## 2025-05-07 RX ORDER — ONDANSETRON 2 MG/ML
4 INJECTION INTRAMUSCULAR; INTRAVENOUS ONCE AS NEEDED
Status: DISCONTINUED | OUTPATIENT
Start: 2025-05-07 | End: 2025-05-07 | Stop reason: HOSPADM

## 2025-05-07 RX ORDER — OXYCODONE HYDROCHLORIDE 5 MG/1
5 TABLET ORAL EVERY 4 HOURS PRN
Refills: 0 | Status: DISCONTINUED | OUTPATIENT
Start: 2025-05-07 | End: 2025-05-08 | Stop reason: HOSPADM

## 2025-05-07 RX ORDER — PROMETHAZINE HYDROCHLORIDE 25 MG/ML
6.25 INJECTION, SOLUTION INTRAMUSCULAR; INTRAVENOUS ONCE AS NEEDED
Status: DISCONTINUED | OUTPATIENT
Start: 2025-05-07 | End: 2025-05-07 | Stop reason: HOSPADM

## 2025-05-07 RX ORDER — EPHEDRINE SULFATE 50 MG/ML
INJECTION INTRAVENOUS AS NEEDED
Status: DISCONTINUED | OUTPATIENT
Start: 2025-05-07 | End: 2025-05-07

## 2025-05-07 RX ORDER — PROPRANOLOL HCL 20 MG
20 TABLET ORAL 3 TIMES DAILY
Status: DISCONTINUED | OUTPATIENT
Start: 2025-05-07 | End: 2025-05-08 | Stop reason: HOSPADM

## 2025-05-07 RX ORDER — ONDANSETRON 2 MG/ML
INJECTION INTRAMUSCULAR; INTRAVENOUS AS NEEDED
Status: DISCONTINUED | OUTPATIENT
Start: 2025-05-07 | End: 2025-05-07

## 2025-05-07 RX ORDER — IODIXANOL 320 MG/ML
INJECTION, SOLUTION INTRAVASCULAR AS NEEDED
Status: DISCONTINUED | OUTPATIENT
Start: 2025-05-07 | End: 2025-05-07 | Stop reason: HOSPADM

## 2025-05-07 RX ORDER — HYDRALAZINE HYDROCHLORIDE 20 MG/ML
15 INJECTION INTRAMUSCULAR; INTRAVENOUS
Status: DISCONTINUED | OUTPATIENT
Start: 2025-05-07 | End: 2025-05-08 | Stop reason: HOSPADM

## 2025-05-07 RX ORDER — PROTAMINE SULFATE 10 MG/ML
INJECTION, SOLUTION INTRAVENOUS AS NEEDED
Status: DISCONTINUED | OUTPATIENT
Start: 2025-05-07 | End: 2025-05-07

## 2025-05-07 RX ORDER — ONDANSETRON 2 MG/ML
4 INJECTION INTRAMUSCULAR; INTRAVENOUS EVERY 6 HOURS PRN
Status: DISCONTINUED | OUTPATIENT
Start: 2025-05-07 | End: 2025-05-08 | Stop reason: HOSPADM

## 2025-05-07 RX ORDER — SENNOSIDES 8.6 MG
1 TABLET ORAL DAILY
Status: DISCONTINUED | OUTPATIENT
Start: 2025-05-07 | End: 2025-05-08 | Stop reason: HOSPADM

## 2025-05-07 RX ORDER — CHLORHEXIDINE GLUCONATE ORAL RINSE 1.2 MG/ML
15 SOLUTION DENTAL ONCE
Status: COMPLETED | OUTPATIENT
Start: 2025-05-07 | End: 2025-05-07

## 2025-05-07 RX ORDER — HEPARIN SODIUM 200 [USP'U]/100ML
INJECTION, SOLUTION INTRAVENOUS
Status: COMPLETED | OUTPATIENT
Start: 2025-05-07 | End: 2025-05-07

## 2025-05-07 RX ORDER — CEFAZOLIN SODIUM 2 G/50ML
2000 SOLUTION INTRAVENOUS ONCE
Status: DISCONTINUED | OUTPATIENT
Start: 2025-05-07 | End: 2025-05-07

## 2025-05-07 RX ORDER — DONEPEZIL HYDROCHLORIDE 10 MG/1
10 TABLET, FILM COATED ORAL
Status: DISCONTINUED | OUTPATIENT
Start: 2025-05-07 | End: 2025-05-08 | Stop reason: HOSPADM

## 2025-05-07 RX ORDER — CHLORHEXIDINE GLUCONATE ORAL RINSE 1.2 MG/ML
15 SOLUTION DENTAL EVERY 12 HOURS SCHEDULED
Status: DISCONTINUED | OUTPATIENT
Start: 2025-05-07 | End: 2025-05-07

## 2025-05-07 RX ORDER — PROPOFOL 10 MG/ML
INJECTION, EMULSION INTRAVENOUS AS NEEDED
Status: DISCONTINUED | OUTPATIENT
Start: 2025-05-07 | End: 2025-05-07

## 2025-05-07 RX ORDER — FENTANYL CITRATE 50 UG/ML
INJECTION, SOLUTION INTRAMUSCULAR; INTRAVENOUS AS NEEDED
Status: DISCONTINUED | OUTPATIENT
Start: 2025-05-07 | End: 2025-05-07

## 2025-05-07 RX ORDER — ATORVASTATIN CALCIUM 40 MG/1
40 TABLET, FILM COATED ORAL EVERY EVENING
Status: DISCONTINUED | OUTPATIENT
Start: 2025-05-07 | End: 2025-05-08 | Stop reason: HOSPADM

## 2025-05-07 RX ORDER — LABETALOL HYDROCHLORIDE 5 MG/ML
5 INJECTION, SOLUTION INTRAVENOUS
Status: DISCONTINUED | OUTPATIENT
Start: 2025-05-07 | End: 2025-05-08 | Stop reason: HOSPADM

## 2025-05-07 RX ORDER — SODIUM CHLORIDE, SODIUM LACTATE, POTASSIUM CHLORIDE, CALCIUM CHLORIDE 600; 310; 30; 20 MG/100ML; MG/100ML; MG/100ML; MG/100ML
125 INJECTION, SOLUTION INTRAVENOUS CONTINUOUS
Status: DISCONTINUED | OUTPATIENT
Start: 2025-05-07 | End: 2025-05-07

## 2025-05-07 RX ORDER — CEFAZOLIN SODIUM 1 G/3ML
INJECTION, POWDER, FOR SOLUTION INTRAMUSCULAR; INTRAVENOUS AS NEEDED
Status: DISCONTINUED | OUTPATIENT
Start: 2025-05-07 | End: 2025-05-07

## 2025-05-07 RX ORDER — AMLODIPINE BESYLATE 5 MG/1
5 TABLET ORAL DAILY
Status: DISCONTINUED | OUTPATIENT
Start: 2025-05-08 | End: 2025-05-08 | Stop reason: HOSPADM

## 2025-05-07 RX ORDER — SODIUM CHLORIDE, SODIUM LACTATE, POTASSIUM CHLORIDE, CALCIUM CHLORIDE 600; 310; 30; 20 MG/100ML; MG/100ML; MG/100ML; MG/100ML
INJECTION, SOLUTION INTRAVENOUS CONTINUOUS PRN
Status: DISCONTINUED | OUTPATIENT
Start: 2025-05-07 | End: 2025-05-07

## 2025-05-07 RX ORDER — ACETAMINOPHEN 325 MG/1
975 TABLET ORAL EVERY 8 HOURS SCHEDULED
Status: DISCONTINUED | OUTPATIENT
Start: 2025-05-07 | End: 2025-05-08 | Stop reason: HOSPADM

## 2025-05-07 RX ORDER — CLOPIDOGREL BISULFATE 75 MG/1
75 TABLET ORAL DAILY
Status: DISCONTINUED | OUTPATIENT
Start: 2025-05-08 | End: 2025-05-08 | Stop reason: HOSPADM

## 2025-05-07 RX ORDER — HYDROMORPHONE HCL/PF 1 MG/ML
0.5 SYRINGE (ML) INJECTION
Status: DISCONTINUED | OUTPATIENT
Start: 2025-05-07 | End: 2025-05-07 | Stop reason: HOSPADM

## 2025-05-07 RX ORDER — PHENYLEPHRINE HCL IN 0.9% NACL 1 MG/10 ML
SYRINGE (ML) INTRAVENOUS AS NEEDED
Status: DISCONTINUED | OUTPATIENT
Start: 2025-05-07 | End: 2025-05-07

## 2025-05-07 RX ORDER — MAGNESIUM HYDROXIDE 1200 MG/15ML
LIQUID ORAL AS NEEDED
Status: DISCONTINUED | OUTPATIENT
Start: 2025-05-07 | End: 2025-05-07 | Stop reason: HOSPADM

## 2025-05-07 RX ORDER — HEPARIN SODIUM 5000 [USP'U]/ML
5000 INJECTION, SOLUTION INTRAVENOUS; SUBCUTANEOUS EVERY 8 HOURS SCHEDULED
Status: DISCONTINUED | OUTPATIENT
Start: 2025-05-07 | End: 2025-05-08 | Stop reason: HOSPADM

## 2025-05-07 RX ORDER — ASPIRIN 81 MG/1
81 TABLET, CHEWABLE ORAL DAILY
Status: DISCONTINUED | OUTPATIENT
Start: 2025-05-08 | End: 2025-05-08 | Stop reason: HOSPADM

## 2025-05-07 RX ORDER — FENTANYL CITRATE/PF 50 MCG/ML
50 SYRINGE (ML) INJECTION
Status: DISCONTINUED | OUTPATIENT
Start: 2025-05-07 | End: 2025-05-07 | Stop reason: HOSPADM

## 2025-05-07 RX ORDER — SODIUM CHLORIDE 9 MG/ML
INJECTION, SOLUTION INTRAVENOUS CONTINUOUS PRN
Status: DISCONTINUED | OUTPATIENT
Start: 2025-05-07 | End: 2025-05-07

## 2025-05-07 RX ORDER — MEPERIDINE HYDROCHLORIDE 25 MG/ML
12.5 INJECTION INTRAMUSCULAR; INTRAVENOUS; SUBCUTANEOUS ONCE AS NEEDED
Status: DISCONTINUED | OUTPATIENT
Start: 2025-05-07 | End: 2025-05-07 | Stop reason: HOSPADM

## 2025-05-07 RX ORDER — LIDOCAINE HYDROCHLORIDE 20 MG/ML
INJECTION, SOLUTION EPIDURAL; INFILTRATION; INTRACAUDAL; PERINEURAL AS NEEDED
Status: DISCONTINUED | OUTPATIENT
Start: 2025-05-07 | End: 2025-05-07

## 2025-05-07 RX ORDER — GLYCOPYRROLATE 0.2 MG/ML
INJECTION INTRAMUSCULAR; INTRAVENOUS AS NEEDED
Status: DISCONTINUED | OUTPATIENT
Start: 2025-05-07 | End: 2025-05-07

## 2025-05-07 RX ORDER — HEPARIN SODIUM 1000 [USP'U]/ML
INJECTION, SOLUTION INTRAVENOUS; SUBCUTANEOUS AS NEEDED
Status: DISCONTINUED | OUTPATIENT
Start: 2025-05-07 | End: 2025-05-07

## 2025-05-07 RX ADMIN — ACETAMINOPHEN 975 MG: 325 TABLET, FILM COATED ORAL at 13:07

## 2025-05-07 RX ADMIN — SODIUM CHLORIDE 100 MCG: 0.9 INJECTION, SOLUTION INTRAVENOUS at 10:00

## 2025-05-07 RX ADMIN — PROTAMINE SULFATE 10 MG: 10 INJECTION, SOLUTION INTRAVENOUS at 09:43

## 2025-05-07 RX ADMIN — ROCURONIUM 40 MG: 50 INJECTION, SOLUTION INTRAVENOUS at 08:02

## 2025-05-07 RX ADMIN — SUGAMMADEX 200 MG: 100 INJECTION, SOLUTION INTRAVENOUS at 09:47

## 2025-05-07 RX ADMIN — SODIUM CHLORIDE: 0.9 INJECTION, SOLUTION INTRAVENOUS at 08:10

## 2025-05-07 RX ADMIN — PROTAMINE SULFATE 5 MG: 10 INJECTION, SOLUTION INTRAVENOUS at 09:34

## 2025-05-07 RX ADMIN — PROPOFOL 40 MG: 10 INJECTION, EMULSION INTRAVENOUS at 08:01

## 2025-05-07 RX ADMIN — SODIUM CHLORIDE, SODIUM LACTATE, POTASSIUM CHLORIDE, AND CALCIUM CHLORIDE: .6; .31; .03; .02 INJECTION, SOLUTION INTRAVENOUS at 07:52

## 2025-05-07 RX ADMIN — SODIUM CHLORIDE, SODIUM LACTATE, POTASSIUM CHLORIDE, AND CALCIUM CHLORIDE 125 ML/HR: .6; .31; .03; .02 INJECTION, SOLUTION INTRAVENOUS at 06:23

## 2025-05-07 RX ADMIN — ACETAMINOPHEN 975 MG: 325 TABLET, FILM COATED ORAL at 21:37

## 2025-05-07 RX ADMIN — HEPARIN SODIUM 7000 UNITS: 1000 INJECTION, SOLUTION INTRAVENOUS; SUBCUTANEOUS at 09:00

## 2025-05-07 RX ADMIN — LABETALOL HYDROCHLORIDE 5 MG: 5 INJECTION, SOLUTION INTRAVENOUS at 16:37

## 2025-05-07 RX ADMIN — EPHEDRINE SULFATE 5 MG: 50 INJECTION INTRAVENOUS at 09:42

## 2025-05-07 RX ADMIN — PROPOFOL 40 MG: 10 INJECTION, EMULSION INTRAVENOUS at 09:38

## 2025-05-07 RX ADMIN — Medication 100 MCG: at 08:08

## 2025-05-07 RX ADMIN — DONEPEZIL HYDROCHLORIDE 10 MG: 10 TABLET, FILM COATED ORAL at 21:37

## 2025-05-07 RX ADMIN — ONDANSETRON 4 MG: 2 INJECTION INTRAMUSCULAR; INTRAVENOUS at 08:30

## 2025-05-07 RX ADMIN — SENNOSIDES 8.6 MG: 8.6 TABLET, FILM COATED ORAL at 13:07

## 2025-05-07 RX ADMIN — EPHEDRINE SULFATE 10 MG: 50 INJECTION INTRAVENOUS at 09:08

## 2025-05-07 RX ADMIN — ROCURONIUM 20 MG: 50 INJECTION, SOLUTION INTRAVENOUS at 08:40

## 2025-05-07 RX ADMIN — PHENYLEPHRINE HYDROCHLORIDE 50 MCG/MIN: 10 INJECTION INTRAVENOUS at 08:08

## 2025-05-07 RX ADMIN — PROPOFOL 50 MG: 10 INJECTION, EMULSION INTRAVENOUS at 09:50

## 2025-05-07 RX ADMIN — CEFAZOLIN 2000 MG: 1 INJECTION, POWDER, FOR SOLUTION INTRAMUSCULAR; INTRAVENOUS; PARENTERAL at 08:29

## 2025-05-07 RX ADMIN — FENTANYL CITRATE 50 MCG: 50 INJECTION INTRAMUSCULAR; INTRAVENOUS at 08:29

## 2025-05-07 RX ADMIN — CHLORHEXIDINE GLUCONATE 15 ML: 1.2 SOLUTION ORAL at 06:33

## 2025-05-07 RX ADMIN — PROTAMINE SULFATE 5 MG: 10 INJECTION, SOLUTION INTRAVENOUS at 09:38

## 2025-05-07 RX ADMIN — SODIUM CHLORIDE 100 MCG: 0.9 INJECTION, SOLUTION INTRAVENOUS at 09:53

## 2025-05-07 RX ADMIN — LIDOCAINE HYDROCHLORIDE 60 MG: 20 INJECTION, SOLUTION EPIDURAL; INFILTRATION; INTRACAUDAL at 07:58

## 2025-05-07 RX ADMIN — HEPARIN SODIUM 5000 UNITS: 5000 INJECTION INTRAVENOUS; SUBCUTANEOUS at 21:37

## 2025-05-07 RX ADMIN — ATORVASTATIN CALCIUM 40 MG: 40 TABLET, FILM COATED ORAL at 18:16

## 2025-05-07 RX ADMIN — FENTANYL CITRATE 25 MCG: 50 INJECTION INTRAMUSCULAR; INTRAVENOUS at 09:49

## 2025-05-07 RX ADMIN — FENTANYL CITRATE 25 MCG: 50 INJECTION INTRAMUSCULAR; INTRAVENOUS at 10:01

## 2025-05-07 RX ADMIN — FENTANYL CITRATE 50 MCG: 50 INJECTION INTRAMUSCULAR; INTRAVENOUS at 07:58

## 2025-05-07 RX ADMIN — EPHEDRINE SULFATE 10 MG: 50 INJECTION INTRAVENOUS at 08:08

## 2025-05-07 RX ADMIN — SODIUM CHLORIDE, SODIUM LACTATE, POTASSIUM CHLORIDE, AND CALCIUM CHLORIDE 125 ML/HR: .6; .31; .03; .02 INJECTION, SOLUTION INTRAVENOUS at 11:17

## 2025-05-07 RX ADMIN — PROPOFOL 120 MG: 10 INJECTION, EMULSION INTRAVENOUS at 07:59

## 2025-05-07 RX ADMIN — PROPRANOLOL HYDROCHLORIDE 20 MG: 20 TABLET ORAL at 21:37

## 2025-05-07 RX ADMIN — Medication 100 MCG: at 09:42

## 2025-05-07 RX ADMIN — GLYCOPYRROLATE 0.2 MCG: 0.2 INJECTION, SOLUTION INTRAMUSCULAR; INTRAVENOUS at 09:14

## 2025-05-07 RX ADMIN — GLYCOPYRROLATE 0.2 MCG: 0.2 INJECTION, SOLUTION INTRAMUSCULAR; INTRAVENOUS at 09:03

## 2025-05-07 RX ADMIN — HEPARIN SODIUM 5000 UNITS: 5000 INJECTION INTRAVENOUS; SUBCUTANEOUS at 13:07

## 2025-05-07 RX ADMIN — SODIUM CHLORIDE 100 MCG: 0.9 INJECTION, SOLUTION INTRAVENOUS at 09:54

## 2025-05-07 RX ADMIN — PROPRANOLOL HYDROCHLORIDE 20 MG: 20 TABLET ORAL at 15:40

## 2025-05-07 RX ADMIN — PROTAMINE SULFATE 10 MG: 10 INJECTION, SOLUTION INTRAVENOUS at 09:41

## 2025-05-07 NOTE — ANESTHESIA POSTPROCEDURE EVALUATION
Post-Op Assessment Note    CV Status:  Stable    Pain management: adequate       Mental Status:  Alert and awake   Hydration Status:  Euvolemic   PONV Controlled:  Controlled   Airway Patency:  Patent     Post Op Vitals Reviewed: Yes    No anethesia notable event occurred.    Staff: CRNA           Last Filed PACU Vitals:  Vitals Value Taken Time   Temp 96.7    Pulse 80    /65 05/07/25 1031   Resp 17    SpO2 96    Vitals shown include unfiled device data.

## 2025-05-07 NOTE — DISCHARGE INSTR - AVS FIRST PAGE
DISCHARGE INSTRUCTIONS  CAROTID ARTERIOGRAM/STENT    ACTIVITY:  Limit your physical activity to walking for the first week and then increase your activity as tolerated.  Walking up steps and normal activities may be resumed as you feel ready.  Avoid strenuous activity such as vigorous exercise.  Avoid heavy lifting (do not lift more than 15 pounds) for the first four weeks after surgery.  You should not drive a car for at least one week following discharge from the hospital and you are off all narcotic pain medication.  You may ride in a car.  If you have had a stroke or mini-stroke, please consult with your neurologist prior to driving.    DO NOT START OR RESUME ANY PREVIOUSLY PLANNED THERAPY (PHYSICAL, CARDIAC, REHAB, ETC…) UNTIL YOU DISCUSS WITH YOUR SURGEON AND THEY FEEL IT IS SAFE TO ENGAGE IN THERAPY.    DIET:  Resume your normal diet.  Good nutrition is important for healing of your incision.  Drink more water than usual for the next 24 hours.  You can expect to have a sore throat and trouble swallowing after surgery which should improve quickly.  If you feel like you are choking, please call your doctor.    RECURRENT SYMPTOMS: If you develop any new numbness, weakness, vision changes, drooping of the face, or difficulty with speech after discharge, CALL 911 or go to the nearest Emergency department immediately.    INCISION/PROCEDURE SITE: You have an incision site at your neck and a procedure site in your groin.  You may have surgical glue at these sites.  There are stitches present under the skin which will absorb on their own.  The glue is used to cover the access, assist in closure, and prevent contamination. This adhesive will darken and peel away on its own within one to two weeks. Do not pick at it.     You should shower daily.  Wash incision daily with soap and water, but do not rub or scrub the incision; rinse thoroughly and pat dry.   Do not bathe in a tub or swim for the first 4 weeks following  surgery or if you have any open wounds.  It is normal to have some bruising, swelling or discoloration around the incision.  IF increasing redness, pain, bleeding or a bulge develops, call our office immediately.    If you notice any active bleeding at the site, apply pressure to the site and call our office ([phone]) or 911.    FOLLOW UP STUDIES:  Doppler ultrasound studies are very important to your post-procedure care.  Your Physician will arrange for them at your first post-procedure visit.  The first study will be 6 weeks after surgery.    FOLLOW UP APPOINTMENTS:  Making and keeping follow up appointments and ultrasound tests are important to your recovery.  If you have difficulty making it to or keeping your follow up appointments, call the office.    If you have increased pain, fever >101.5, increased drainage, redness or a bad smell at your surgery site, new coldness/numbness of your arm or leg, please call us immediately and GO directly to the ER.    PLEASE CALL THE OFFICE IF YOU HAVE ANY QUESTIONS  [phone]  [phone] [address], [address], [place] [address]  [address] [place] [address]  [address] [place] [address]  [address] [facility], [place] [address]  [address] [facility], [address], [place] [address]  [address] [facility], [address], [place] [address]  [address] [facility], Entrance A, 2nd Floor, [place] [address]  [address] [facility], 1st Floor, [address], [place] [address]  [address] [facility] [place] [address]  [address] [facility], [address], [place] [address]

## 2025-05-07 NOTE — ANESTHESIA PROCEDURE NOTES
"Arterial Line Insertion    Performed by: Fareed Hutson DO  Authorized by: Fareed Hutson DO  Consent: Written consent obtained.  Risks and benefits: risks, benefits and alternatives were discussed  Consent given by: patient  Patient understanding: patient states understanding of the procedure being performed  Patient consent: the patient's understanding of the procedure matches consent given  Procedure consent: procedure consent matches procedure scheduled  Relevant documents: relevant documents present and verified  Test results: test results available and properly labeled  Site marked: the operative site was not marked  Radiology Images: No Radiology Images displayed or report reviewed.    Required items: required blood products, implants, devices, and special equipment available  Patient identity confirmed: hospital-assigned identification number  Time out: Immediately prior to procedure a \"time out\" was called to verify the correct patient, procedure, equipment, support staff and site/side marked as required.  Preparation: Patient was prepped and draped in the usual sterile fashion.  Indications: hemodynamic monitoring  Orientation:  Left  Location: radial artery  Sedation:  Patient sedated: yes (GA)    Procedure Details:      Maximo's test normal: yes  Needle gauge: 20  Placement technique:  Ultrasound guided  Number of attempts: 2    Post-procedure:  Waveform: good waveform  Post-procedure CNS: normal  Patient tolerance: patient tolerated the procedure well with no immediate complications          "

## 2025-05-07 NOTE — PLAN OF CARE
Problem: PAIN - ADULT  Goal: Verbalizes/displays adequate comfort level or baseline comfort level  Description: Interventions:- Encourage patient to monitor pain and request assistance- Assess pain using appropriate pain scale- Administer analgesics based on type and severity of pain and evaluate response- Implement non-pharmacological measures as appropriate and evaluate response- Consider cultural and social influences on pain and pain management- Notify physician/advanced practitioner if interventions unsuccessful or patient reports new pain  Outcome: Progressing     Problem: SAFETY ADULT  Goal: Patient will remain free of falls  Description: INTERVENTIONS:- Educate patient/family on patient safety including physical limitations- Instruct patient to call for assistance with activity - Consult OT/PT to assist with strengthening/mobility - Keep Call bell within reach- Keep bed low and locked with side rails adjusted as appropriate- Keep care items and personal belongings within reach- Initiate and maintain comfort rounds- Make Fall Risk Sign visible to staff- Offer Toileting every 2 Hours, in advance of need- Initiate/Maintain bed alarm- Obtain necessary fall risk management equipment: - Apply yellow socks and bracelet for high fall risk patients- Consider moving patient to room near nurses station  Outcome: Progressing  Goal: Maintain or return to baseline ADL function  Description: INTERVENTIONS:-  Assess patient's ability to carry out ADLs; assess patient's baseline for ADL function and identify physical deficits which impact ability to perform ADLs (bathing, care of mouth/teeth, toileting, grooming, dressing, etc.)- Assess/evaluate cause of self-care deficits - Assess range of motion- Assess patient's mobility; develop plan if impaired- Assess patient's need for assistive devices and provide as appropriate- Encourage maximum independence but intervene and supervise when necessary- Involve family in  performance of ADLs- Assess for home care needs following discharge - Consider OT consult to assist with ADL evaluation and planning for discharge- Provide patient education as appropriate  Outcome: Progressing  Goal: Maintains/Returns to pre admission functional level  Description: INTERVENTIONS:- Perform AM-PAC 6 Click Basic Mobility/ Daily Activity assessment daily.- Set and communicate daily mobility goal to care team and patient/family/caregiver. - Collaborate with rehabilitation services on mobility goals if consulted- Perform Range of Motion 4 times a day.- Reposition patient every 2 hours.- Dangle patient 1 times a day- Stand patient 1 times a day- Ambulate patient 2 times a day- Out of bed to chair 3 times a day - Out of bed for meals 3 times a day- Out of bed for toileting- Record patient progress and toleration of activity level   Outcome: Progressing     Problem: DISCHARGE PLANNING  Goal: Discharge to home or other facility with appropriate resources  Description: INTERVENTIONS:- Identify barriers to discharge w/patient and caregiver- Arrange for needed discharge resources and transportation as appropriate- Identify discharge learning needs (meds, wound care, etc.)- Arrange for interpretive services to assist at discharge as needed- Refer to Case Management Department for coordinating discharge planning if the patient needs post-hospital services based on physician/advanced practitioner order or complex needs related to functional status, cognitive ability, or social support system  Outcome: Progressing

## 2025-05-07 NOTE — OP NOTE
OPERATIVE REPORT  PATIENT NAME: Joe Aviles    :  1940  MRN: [id number]  Pt Location: [place] [address]    SURGERY DATE: 2025    Surgeons and Role:     * Sven Barajas DO - Primary     * Ren Araiza MD - Fellow    Preop Diagnosis:  Asymptomatic carotid artery stenosis, left [I65.22]    Post-Op Diagnosis Codes:     * Asymptomatic carotid artery stenosis, left [I65.22]    Procedure(s):  Left - ANGIOPLASTY ARTERY CAROTID W/ STENT    Specimen(s):  * No specimens in log *    Estimated Blood Loss:   Minimal    Drains:  * No LDAs found *    Anesthesia Type:   General    Operative Indications:  Asymptomatic carotid artery stenosis, left [I65.22]  Patient is an 84-year-old gentleman with an extensive past medical history who previously underwent successful right transcarotid artery revascularization.  He was seen back in the office for asymptomatic high-grade left carotid stenosis.  Left TCAR recommended.  The procedure, risk, benefits, alternatives, and anticipated postop course were discussed in detail.  Patient was agreeable to proceed.  Written consent was obtained.    Operative Findings:  Moderate to high grade carotid bulb/proximal ICA stenoses successfully predilated using a 5 x 3 balloon followed by a 9 x 40 self-expanding stent.  Patient noted to be neurologically intact at the conclusion of procedure.      Complications:   None immediately apparent    Procedure and Technique:  The patient was probably notified in the preop holding area.  The patient name, laterality, nature procedure verified.  The patient's operative site was marked.  The patient was brought to the operating room where he was positioned supine on the OR table.  After adequate duction general anesthesia via endotracheal tube a radial artery monitoring line was placed.  Patient was subsequently positioned with a shoulder roll with head turned towards the right to facilitate exposure of the left neck and upper chest.  The upper  chest and bilateral groins were prepped and draped in usual sterile fashion.  A preoperative dose of antibiotics was administered.  A formal timeout was performed with the OR staff and all were in agreement.  A transverse skin incision was carried out 1 fingerbreadth above the left clavicle.  The incision was deepened down through the subcutaneous tissue and platysma using the electrocautery.  The 2 heads of the sternocleidomastoid were identified and its muscle fibers split in an avascular plane.  Self-retaining retractors were positioned.  Dissection was next carried sharply with Metzenbaum scissors.  The internal jugular vein was identified mobilized and retracted laterally.  Dissection was next carried down onto the common carotid artery which was circumstantially dissected free from the surrounding tissue and encircled with both a vessel loop and an umbilical tape.  Patient was systemically heparinized with 7000 units of IV heparin.  ACT monitoring was carried out and noted to be greater than 300.  A 5-0 Prolene pursestring suture was placed at the proposed left common carotid artery access site.  The left common carotid artery was next accessed using a micropuncture needle.  The needle was exchanged out over a microwire for a microsheath.    Under ultrasound guidance the right common femoral vein was surveyed.  It was noted to be easily compressible and free of thrombus.  The common femoral vein was accessed using a micropuncture needle.  A microsheath was inserted.  A unrival guidewire was next advanced centrally.  The micro sheath was exchanged out for the 8 Irish venous sheath.  Carotid angiography was subsequently carried out to identify the carotid bulb/ICA lesion.  Next, the inner dilator and microwire were reinserted.  The microwire was advanced and the external carotid artery selected.  The micro sheath was advanced over the microwire into the ECA.  The microwire was removed and exchanged for the  stiff J guidewire which was advanced under fluoroscopy into the ECA.  The micro sheath was subsequently exchanged out for the 8 Italian arterial sheath.  The sheath was sutured in place x 2.  The flow reversal and root system was next connected between the arterial and venous sheaths.  Repeat angiography under magnification was next carried out.  A 5 x 3 balloon was next on the 014 wire.  Next a TCAR timeout was carried out.  The patient was pretty medicated with glycopyrrolate 0.2 x 2.  The common carotid artery was occluded using the vessel loop.  The flow reversal system was ensured to be functioning adequately with flushing of the venous sheath.  The 014 wire was next used to select the ICA and successfully traversed the lesions.  The balloon was advanced over the microwire and positioned at the point of maximal stenosis.  The lesion was next predilated using a 5 x 3cm balloon.  Patient remained hemodynamically stable throughout this portion of the procedure.  The balloon was deflated and removed over the wire.  A 9mm x 4cm self-expanding stent was next mounted onto the 014 wire and advanced under fluoroscopy.  Once the stent was in adequate position the stent was deployed without difficulty.  Subsequent angiography in two obliquities demonstrated good stent vessel wall apposition with no overt evidence of vasospasm or prolapse of atheroma within the stent interstices.  No additional interventions were performed.  The wire was removed under fluoroscopy.  The flow reversal system was allowed to continue x 2 minutes following stent deployment.  The vessel loop was released on the common carotid artery restoring antegrade cerebral flow.  Total flow reversal time was 8 min.  The flow reversal system was disconnected from the arterial and allowed to drain into the venous sheath and subsequently disconnected in its entirety.  50 mg of protamine had been ordered however only 30 mg administered as patient became  hypotensive.  This was felt related to protamine effect as well as possible propofol.  The patient's blood pressure responded to resuscitation and pharmacologic measures.  The venous sheath was removed and hemostasis obtained with digital compression.  The arterial sheath was removed and the previously placed pursestring suture cinched down with good hemostasis.  Topical hemostatic fibrillar was placed to assist with minor oozing.  The wound was subsequently irrigated.  Hemostasis was deemed satisfactory.  The incision was closed in a multilayer fashion.  Skin glue was applied to the incision site.  The patient tolerated procedure well.  He was awakened, extubated and transferred to recovery room.       I was present for the entire procedure.    Patient Disposition:  PACU  and Critical Care Unit             SIGNATURE: Sven Barajas DO  DATE: May 7, 2025  TIME: 9:54 AM    Vascular Quality Initiative - Carotid Artery Stent    Functional Status: Restricted in physically strenuous activity but ambulatory and able to carry out work of a light or sedentary nature.    High Risk Factors For CEA: Medical risk Age >=75    Refused for Surgery: No    Pre-op Imaging is CT/CTA: Yes      Lesion Calcification: 51-99% circumference    Arch Atherosclerosis: Mild    Urgency: Elective      ASA: III  Anesthesia:  Anesthesia type not filed in the log.    Indication: Asymptomatic stenosis    Skin Prep: Chlorhexidene    Arch Type: Type II    Bovine Arch: no     Approach: Carotid open     Medication Loading: None    Prophylactic Anti-bradyarrhythmic yes    Anticoagulant: Heparin    Antiplatelet IIb/IIIa: no    Bradyarrhythmia Requiring Tx: Yes, medication    Fluoro Time: Fluoroscopy time: 4.4 minutes  Contrast volume: 16 cc of Visipaque 320  DAP: 21.8 Gy/cm2    Distinct Lesions Treated: 1      If Distinct Lesions Treated is 1, Second Stenosis (Not Treated): No    Lesion Type: Atherosclerosis  Lesion Side: left    Lesion Location: ICA      ICA Distal Tortuosity: None/Mild    Lesion Length: 30mm    Lesion Stenosis:90%    Protection Device Used: Yes, successful      Protection Device Type: Flow reversal     Flow Reversal Type: [address] ENROUTE      Flow Reversal Time: 9 minutes    Pre-dilate Before Protection Device: yes  Angioplasty required in order to cross lesion with protection device. For TCAR procedures answer yes if vessel pre-dilated prior to stent placement, or no if not pre-dilated before stent placement.     Pre-dilate Lesion: yes  Pre-dilate Lesion: Angioplasty required in order to cross lesion for stent placement. Required even if there is a technical failure in stent deployment.     Technical Failure: No       Number of Stents: 1    Post- dilate: No      Neurologic Change: No    Intra-Cranial Completion Angiogram: No    Total Procedure Time:   Event Time In   Procedure Start 0852   Procedure Closing 0948   Procedure Finish 0959

## 2025-05-07 NOTE — ANESTHESIA POSTPROCEDURE EVALUATION
Post-Op Assessment Note    CV Status:  Stable    Pain management: adequate       Mental Status:  Alert and awake   Hydration Status:  Euvolemic   PONV Controlled:  Controlled   Airway Patency:  Patent     Post Op Vitals Reviewed: Yes    No anethesia notable event occurred.    Staff: Anesthesiologist           Last Filed PACU Vitals:  Vitals Value Taken Time   Temp 96.8 °F (36 °C) 05/07/25 1145   Pulse 60 05/07/25 1146   /70 05/07/25 1145   Resp 14 05/07/25 1146   SpO2 93 % 05/07/25 1146   Vitals shown include unfiled device data.    Modified Tristin:     Vitals Value Taken Time   Activity 2 05/07/25 1145   Respiration 2 05/07/25 1145   Circulation 2 05/07/25 1145   Consciousness 1 05/07/25 1145   Oxygen Saturation 2 05/07/25 1145     Modified Tristin Score: 9

## 2025-05-07 NOTE — H&P
Vascular Surgery  History and Physical  Joe Aviles 84 y.o. male MRN: [id number]  Unit/Bed#: OR [facility] Encounter: [id number]    Assessment and Plan:  83 yo M h/o symptomatic R IBETH s/p R TCAR presents for L TCAR for asymptomatic IBETH.    -OR today. R/B/A/C discussed, pt elects to proceed  -NPO for OR      History of Present Illness   Joe returned to the office to review CTA of the head and neck as well as lower extremity arterial duplex.  He has history of right TCAR for symptomatic disease.  He has known asymptomatic high-grade left carotid stenosis.  At this time do recommend left TCAR.  The procedure, risk, benefits, alternatives, and anticipated postoperative course were discussed in detail.    Review of Systems   Respiratory:  Negative for chest tightness.    Cardiovascular:  Negative for chest pain.   Gastrointestinal:  Negative for abdominal pain.   All other systems reviewed and are negative.      Historical Information   Past Medical History:   Diagnosis Date    BPH (benign prostatic hyperplasia)     Colon polyp     Dementia (HCC)     Diverticulitis     Hypertension     Macular degeneration     poor vision    Shortness of breath     long term smoker    Stroke (HCC)     TIA x 7, LUE weakness/numbness    Wears dentures     Wears hearing aid in both ears      Past Surgical History:   Procedure Laterality Date    COLONOSCOPY      CORONARY ANGIOPLASTY WITH STENT PLACEMENT      CT CYSTOGRAM  1/14/2022    CYSTOSCOPY      OH TCAT IV STENT CRV CRTD ART EMBOLIC PROTECJ Right 05/22/2024    Procedure: ANGIOPLASTY ARTERY CAROTID W/ STENT;  Surgeon: Sven Barajas DO;  Location: [facility];  Service: Vascular    PROSTATE SURGERY      TONSILLECTOMY      VASECTOMY       Social History   Social History     Substance and Sexual Activity   Alcohol Use Not Currently     Social History     Substance and Sexual Activity   Drug Use No     Social History     Tobacco Use   Smoking Status Every Day    Types: Cigarettes    Smokeless Tobacco Never   Tobacco Comments    Down to 1/2 ppd as of 4/2025. Last smoked today 5/7     Family History:   Family History   Problem Relation Age of Onset    Brain cancer Mother     Cancer Brother         Bladder    Brain cancer Child        Meds/Allergies   PTA meds:   Prior to Admission Medications   Prescriptions Last Dose Informant Patient Reported? Taking?   Multiple Vitamins-Minerals (OCUVITE PO) Past Week Spouse/Significant Other Yes Yes   Sig: Take 1 tablet by mouth daily   acetaminophen (TYLENOL) 325 mg tablet 5/7/2025 at  2:00 AM Spouse/Significant Other No Yes   Sig: Take 2 tablets (650 mg total) by mouth every 6 (six) hours as needed for mild pain   amLODIPine (NORVASC) 5 mg tablet 5/6/2025 Spouse/Significant Other Yes Yes   Sig: Take 5 mg by mouth daily   aspirin 81 mg chewable tablet 5/6/2025 at  8:30 PM Spouse/Significant Other No Yes   Sig: Chew 1 tablet (81 mg total) daily   atorvastatin (LIPITOR) 40 mg tablet 5/6/2025 Spouse/Significant Other No Yes   Sig: Take 1 tablet (40 mg total) by mouth every evening   buPROPion (WELLBUTRIN XL) 150 mg 24 hr tablet Not Taking  Yes No   Sig: Take 150 mg by mouth every morning   Patient not taking: Reported on 4/28/2025   clopidogrel (PLAVIX) 75 mg tablet 5/7/2025 at  2:00 AM Spouse/Significant Other No Yes   Sig: Take 1 tablet (75 mg total) by mouth daily   donepezil (ARICEPT) 5 mg tablet 5/6/2025 Spouse/Significant Other Yes Yes   Sig: Take 10 mg by mouth daily at bedtime   losartan (COZAAR) 100 MG tablet 5/6/2025  Yes Yes   Sig: Take 25 mg by mouth daily   propranolol (INDERAL) 20 mg tablet 5/7/2025 at  2:00 AM Spouse/Significant Other Yes Yes   Sig: Take 20 mg by mouth 3 (three) times a day   tamsulosin (FLOMAX) 0.4 mg  Spouse/Significant Other Yes No   Sig: Take 0.4 mg by mouth daily with dinner   Patient not taking: Reported on 3/3/2025      Facility-Administered Medications: None     No Known Allergies    Objective   First Vitals:   Blood  "Pressure: 160/76 (05/07/25 0548)  Pulse: 66 (05/07/25 0548)  Temperature: (!) 97.4 °F (36.3 °C) (05/07/25 0548)  Temp Source: Temporal (05/07/25 0548)  Respirations: 16 (05/07/25 0548)  Height: 5' 10\" (177.8 cm) (05/07/25 0607)  Weight - Scale: 69.6 kg (153 lb 7 oz) (05/07/25 0607)  SpO2: 97 % (05/07/25 0548)    Current Vitals:   Blood Pressure: 160/76 (05/07/25 0548)  Pulse: 66 (05/07/25 0548)  Temperature: (!) 97.4 °F (36.3 °C) (05/07/25 0548)  Temp Source: Temporal (05/07/25 0548)  Respirations: 16 (05/07/25 0548)  Height: 5' 10\" (177.8 cm) (05/07/25 0607)  Weight - Scale: 69.6 kg (153 lb 7 oz) (05/07/25 0607)  SpO2: 97 % (05/07/25 0548)    No intake or output data in the 24 hours ending 05/07/25 0707    Invasive Devices       Peripheral Intravenous Line  Duration             Peripheral IV 05/07/25 Right Hand <1 day                    Physical Exam  Constitutional:       General: He is not in acute distress.     Appearance: He is well-developed.   HENT:      Head: Normocephalic and atraumatic.   Eyes:      General: No scleral icterus.     Conjunctiva/sclera: Conjunctivae normal.   Neck:      Trachea: No tracheal deviation.   Cardiovascular:      Rate and Rhythm: Normal rate and regular rhythm.      Pulses:           Radial pulses are 1+ on the right side and 1+ on the left side.        Femoral pulses are 1+ on the right side and 1+ on the left side.       Popliteal pulses are 0 on the right side and 0 on the left side.        Posterior tibial pulses are detected w/ Doppler on the right side and detected w/ Doppler on the left side.      Heart sounds: Normal heart sounds.   Pulmonary:      Effort: Pulmonary effort is normal.      Breath sounds: Normal breath sounds.   Abdominal:      General: There is no distension.      Palpations: Abdomen is soft. There is no mass (no appreciable aortic pulsation/aneurysm).      Tenderness: There is no abdominal tenderness. There is no guarding or rebound.   Musculoskeletal:    "      General: Normal range of motion.      Cervical back: Normal range of motion and neck supple.   Skin:     General: Skin is warm and dry.   Neurological:      Mental Status: He is alert and oriented to person, place, and time.   Psychiatric:         Behavior: Behavior normal.       Counseling / Coordination of Care  Total floor / unit time spent today 20 minutes. This involved direct patient contact where I performed a full history and physical, reviewed previous records, and reviewed laboratory and other diagnostic studies. Greater than 50% of total time was spent with the patient and / or family counseling and / or coordination of care.    Ren Araiza MD  5/7/2025

## 2025-05-07 NOTE — QUICK NOTE
"Vascular Surgery Post-Op Note    Procedure:   Left TCAR     Subjective:   Patient is doing well postop.  Pain is well controlled with scheduled Tylenol. Denies N/V. No complaints at this time.     Objective:   Vitals:  Vitals:    05/07/25 1400   BP:    Pulse: 74   Resp: 19   Temp:    SpO2: 93%       I/O this shift:  In: 1940.8 [P.O.:120; I.V.:1820.8]  Out: 450 [Urine:450]    Physical Exam:  General: No acute distress  CV: regular rate and rhythm, bradycardic with HR 50s  Resp: normal effort  Abd: soft, nontender  Ext: warm and well perfused  Wound: mild ecchymosis and post-operative swelling left neck with well approximated incision  Neuro: alert, oriented x4, no focal deficits, face symmetric, tongue midline      Vitals:  BP      Temp      Pulse     Resp      SpO2        I/Os:  I/O this shift:  In: 1940.8 [P.O.:120; I.V.:1820.8]  Out: 450 [Urine:450]    Lab Results and Cultures:   CBC with diff:   Lab Results   Component Value Date    WBC 7.65 04/03/2025    HGB 14.8 04/03/2025    HCT 45.2 04/03/2025    MCV 95 04/03/2025     04/03/2025    RBC 4.75 04/03/2025    MCH 31.2 04/03/2025    MCHC 32.7 04/03/2025    RDW 13.8 04/03/2025    MPV 12.0 04/03/2025    NRBC 0 01/31/2025   ,   BMP/CMP:  Lab Results   Component Value Date    K 3.8 04/03/2025    K 4.1 09/23/2024     (H) 04/03/2025     09/23/2024    CO2 30 04/03/2025    CO2 25 09/23/2024    BUN 13 04/03/2025    BUN 16 09/23/2024    CREATININE 0.89 04/03/2025    CREATININE 1.05 09/23/2024    CALCIUM 9.0 04/03/2025    CALCIUM 9.5 09/23/2024    AST 20 01/31/2025    AST 19 09/23/2024    ALT 25 01/31/2025    ALT 27 09/23/2024    ALKPHOS 123 (H) 01/31/2025    ALKPHOS 111 09/23/2024    EGFR 79 04/03/2025    EGFR 71 09/23/2024    EGFR 69 12/11/2019   ,   Lipid Panel: No results found for: \"CHOL\",   Coags:   Lab Results   Component Value Date    PT 13.6 05/09/2024    PTT 34 12/26/2024    INR 1.05 04/03/2025    INR 1.0 05/09/2024   ,     Current " Medications:    Current Facility-Administered Medications:     acetaminophen (TYLENOL) tablet 975 mg, Q8H FAUSTO    [START ON 5/8/2025] amLODIPine (NORVASC) tablet 5 mg, Daily    [START ON 5/8/2025] aspirin chewable tablet 81 mg, Daily    atorvastatin (LIPITOR) tablet 40 mg, QPM    [START ON 5/8/2025] clopidogrel (PLAVIX) tablet 75 mg, Daily    donepezil (ARICEPT) tablet 10 mg, HS    heparin (porcine) subcutaneous injection 5,000 Units, Q8H FAUSTO **AND** Platelet count, Once    labetalol (NORMODYNE) injection 5 mg, Q15 Min PRN **AND** hydrALAZINE (APRESOLINE) injection 15 mg, Q15 Min PRN **AND** niCARdipine (CARDENE) 25 mg (STANDARD CONCENTRATION) in sodium chloride 0.9% 250 mL, Continuous PRN    ondansetron (ZOFRAN) injection 4 mg, Q6H PRN    oxyCODONE (ROXICODONE) IR tablet 5 mg, Q4H PRN    oxyCODONE (ROXICODONE) split tablet 2.5 mg, Q4H PRN    sodium chloride 0.9 % bolus 500 mL, Once PRN **FOLLOWED BY** [START ON 5/8/2025] phenylephrine (CIELO-SYNEPHRINE) 50 mg (STANDARD CONCENTRATION) in sodium chloride 0.9% 250 mL, Continuous PRN    propranolol (INDERAL) tablet 20 mg, TID    senna (SENOKOT) tablet 8.6 mg, Daily    Assessment/Plan:   Joe Aviles is a 84 y.o. male s/p left TCAR 2/2 asymptomatic left carotid stenosis    -Pain control with scheduled Tylenol and as needed oxycodone  -Advance diet as tolerated  -Advance activity as tolerated  -BP monitoring via arterial line, goal -160  -Frequent neurovascular checks  -Appreciate ICU assistance  -Monitor in ICU overnight  -Medical optimization with ASA, Plavix, and atorvastatin      RODRIGUEZ Streeter MD  5/7/2025

## 2025-05-07 NOTE — ASSESSMENT & PLAN NOTE
S/P left TCAR, POD #0  Continue neuro checks  Monitor groin site  Monitor BP and HR  Further recommendations per vascular surgery

## 2025-05-07 NOTE — CONSULTS
Consultation - Critical Care/ICU   Name: Joe Aviles 84 y.o. male I MRN: [id number]  Unit/Bed#: ICU 13 I Date of Admission: 5/7/2025   Date of Service: 5/7/2025 I Hospital Day: 0   Inpatient consult to Surgical Critical Care  Consult performed by: RODRIGUEZ Asher  Consult ordered by: Ren Araiza MD        Physician Requesting Evaluation: Sven Barajas DO   Reason for Evaluation / Principal Problem: post op medical management    I have discussed the above management plan in detail with the primary service.     Assessment & Plan  Asymptomatic stenosis of left carotid artery  S/P left TCAR, POD #0  Continue neuro checks  Monitor groin site  Monitor BP and HR  Further recommendations per vascular surgery  Essential hypertension  Hold Losartan today  Resume Amlodipine in the AM  BP goals between 100 and 160 systolic today  Dementia (HCC)  Continue aricept  Disposition: Critical care    History of Present Illness   Joe Aviles is a 84 y.o. who presents post operatively after undergoing a left TCAR for high grade carotid stenosis. He denies pain at present.     History obtained from chart review and the patient.  Review of Systems: See HPI for Review of Systems    Historical Information   Past Medical History:  No date: BPH (benign prostatic hyperplasia)  No date: Colon polyp  No date: Dementia (HCC)  No date: Diverticulitis  No date: Hypertension  No date: Macular degeneration      Comment:  poor vision  No date: Shortness of breath      Comment:  long term smoker  No date: Stroke (HCC)      Comment:  TIA x 7, LUE weakness/numbness  No date: Wears dentures  No date: Wears hearing aid in both ears Past Surgical History:  No date: COLONOSCOPY  No date: CORONARY ANGIOPLASTY WITH STENT PLACEMENT  1/14/2022: CT CYSTOGRAM  No date: CYSTOSCOPY  5/7/2025: IR CAROTID STENT  05/22/2024: VT TCAT IV STENT CRV CRTD ART EMBOLIC PROTECJ; Right      Comment:  Procedure: ANGIOPLASTY ARTERY CAROTID W/ STENT;                  Surgeon: Sven Barajas DO;  Location: [facility];                 Service: Vascular  No date: PROSTATE SURGERY  No date: TONSILLECTOMY  No date: VASECTOMY   Current Outpatient Medications   Medication Instructions    acetaminophen (TYLENOL) 650 mg, Oral, Every 6 hours PRN    amLODIPine (NORVASC) 5 mg, Oral, Daily    aspirin 81 mg, Oral, Daily    atorvastatin (LIPITOR) 40 mg, Oral, Every evening    buPROPion (WELLBUTRIN XL) 150 mg, Every morning    clopidogrel (PLAVIX) 75 mg, Oral, Daily    donepezil (ARICEPT) 10 mg, Daily at bedtime    losartan (COZAAR) 25 mg, Daily    Multiple Vitamins-Minerals (OCUVITE PO) 1 tablet, Daily    propranolol (INDERAL) 20 mg, 3 times daily    tamsulosin (FLOMAX) 0.4 mg, Daily with dinner    No Known Allergies   Social History     Tobacco Use    Smoking status: Every Day     Types: Cigarettes    Smokeless tobacco: Never    Tobacco comments:     Down to 1/2 ppd as of 4/2025. Last smoked today 5/7   Vaping Use    Vaping status: Never Used   Substance Use Topics    Alcohol use: Not Currently    Drug use: No    Family History   Problem Relation Age of Onset    Brain cancer Mother     Cancer Brother         Bladder    Brain cancer Child           Objective :                   Vitals I/O      Most Recent Min/Max in 24hrs   Temp (!) 97.2 °F (36.2 °C) Temp  Min: 96.7 °F (35.9 °C)  Max: 97.4 °F (36.3 °C)   Pulse 56 Pulse  Min: 56  Max: 80   Resp 13 Resp  Min: 12  Max: 21   /76 BP  Min: 108/66  Max: 160/76   O2 Sat 95 % SpO2  Min: 91 %  Max: 97 %      Intake/Output Summary (Last 24 hours) at 5/7/2025 1346  Last data filed at 5/7/2025 1301  Gross per 24 hour   Intake 1940.83 ml   Output 150 ml   Net 1790.83 ml       Diet Regular; Regular House    Invasive Monitoring           Physical Exam   Physical Exam  Eyes:      Pupils: Pupils are equal, round, and reactive to light.   Skin:     General: Skin is warm and dry.   HENT:      Head: Normocephalic.      Mouth/Throat:      Mouth:  Mucous membranes are dry.   Neck:      Comments: Left neck puncture site is with mild ecchymosis, soft, no swelling  Cardiovascular:      Rate and Rhythm: Normal rate and regular rhythm.   Abdominal:      Palpations: Abdomen is soft.      Tenderness: There is no abdominal tenderness.   Constitutional:       Appearance: He is well-nourished.   Pulmonary:      Effort: Pulmonary effort is normal.      Breath sounds: Normal breath sounds.   Neurological:      General: No focal deficit present.      Mental Status: He is alert.      Motor: Strength full and intact in all extremities.          Diagnostic Studies      Lab Results: I have reviewed the following results:     Medications:  Scheduled PRN   acetaminophen, 975 mg, Q8H FAUSTO  [START ON 5/8/2025] amLODIPine, 5 mg, Daily  [START ON 5/8/2025] aspirin, 81 mg, Daily  atorvastatin, 40 mg, QPM  [START ON 5/8/2025] clopidogrel, 75 mg, Daily  donepezil, 10 mg, HS  heparin (porcine), 5,000 Units, Q8H FAUSTO  propranolol, 20 mg, TID  senna, 1 tablet, Daily      labetalol, 5 mg, Q15 Min PRN   And  hydrALAZINE, 15 mg, Q15 Min PRN   And  niCARdipine, 1-15 mg/hr, Continuous PRN  ondansetron, 4 mg, Q6H PRN  oxyCODONE, 5 mg, Q4H PRN  oxyCODONE, 2.5 mg, Q4H PRN  sodium chloride, 500 mL, Once PRN   Followed by  [START ON 5/8/2025] phenylephine,  mcg/min, Continuous PRN       Continuous    niCARdipine, 1-15 mg/hr  [START ON 5/8/2025] phenylephine,  mcg/min         Labs:   CBC    No recent results  BMP    No recent results    Coags    No recent results     Additional Electrolytes  No recent results       Blood Gas    No recent results  No recent results LFTs  No recent results    Infectious  No recent results  Glucose  No recent results     Administrative Statements   I have spent a total time of 40 minutes in caring for this patient on the day of the visit/encounter including Diagnostic results, Impressions, Documenting in the medical record, and Obtaining or reviewing history   .

## 2025-05-07 NOTE — ANESTHESIA PREPROCEDURE EVALUATION
Procedure:  ANGIOPLASTY ARTERY CAROTID W/ STENT (Left: Neck)    Relevant Problems   CARDIO   (+) Asymptomatic stenosis of left carotid artery   (+) Bilateral carotid artery stenosis   (+) Essential hypertension   (+) PVD (peripheral vascular disease) (HCC)      GI/HEPATIC   (+) Rectal bleeding   (+) Small bowel obstruction (HCC)      /RENAL   (+) BPH (benign prostatic hyperplasia)      HEMATOLOGY   (+) Thrombocytopenia (HCC)      NEURO/PSYCH   (+) Acute ischemic multifocal right-sided anterior circulation stroke (HCC)   (+) Dementia (HCC)      PULMONARY   (+) Shortness of breath      Behavioral Health   (+) Cigarette nicotine dependence with nicotine-induced disorder      Neurology/Sleep   (+) Stroke-like symptoms      Orthopedic/Musculoskeletal   (+) Claudication (HCC)    2024 Echo:    Left Ventricle: Left ventricular cavity size is normal. There is mild concentric hypertrophy. The left ventricular ejection fraction is 60% by biplane measurement. Systolic function is normal. Although no diagnostic regional wall motion abnormality was identified, this possibility cannot be completely excluded on the basis of this study. Diastolic function is mildly abnormal, consistent with grade I (abnormal) relaxation.    Atrial Septum: No patent foramen ovale detected, confirmed at rest using agitated saline contrast, confirmed by provocation with cough, using agitated saline contrast and with valsalva, using agitated saline contrast.    IVC/SVC: The right atrial pressure is estimated at 8.0 mmHg. The inferior vena cava is normal in size. Respirophasic changes were blunted (less than 50% variation).    Physical Exam    Airway    Mallampati score: II  TM Distance: <3 FB       Dental    upper dentures and lower dentures    Cardiovascular  Rhythm: regular, Rate: normal    Pulmonary   Breath sounds clear to auscultation    Other Findings        Anesthesia Plan  ASA Score- 3     Anesthesia Type- general with ASA Monitors.          Additional Monitors: arterial line.    Airway Plan: ETT.    Comment: 2nd PIV, art line, comfirmed transfusion ok if needed, discussed potential worseining of mental status post op, planned post-op ICU.       Plan Factors-Exercise tolerance (METS): >4 METS.    Chart reviewed.   Existing labs reviewed.     Patient is a current smoker.  Patient instructed to abstain from smoking on day of procedure. Patient smoked on day of surgery (0200).    Obstructive sleep apnea risk education given perioperatively.        Induction- intravenous.    Postoperative Plan- Plan for postoperative opioid use. Planned trial extubation        Informed Consent- Anesthetic plan and risks discussed with patient and spouse.        NPO Status:  Vitals Value Taken Time   Date of last liquid 05/07/25 05/07/25 0606   Time of last liquid 0200 05/07/25 0606   Date of last solid 05/06/25 05/07/25 0606   Time of last solid 2200 05/07/25 0606

## 2025-05-07 NOTE — CONSULTS
Consultation - Geriatrics   Joe Cowartgee 84 y.o. male MRN: [id number]  Unit/Bed#: ICU 13 Encounter: [id number]      Assessment/Plan    Cognitive Screening  Patient has a documented history of memory loss  He has been referred to [facility]  He is on donepezil at baseline   Patient states that he feels he has the beginning stages of dementia   He is alert and oriented x 4 on my exam today   He notes that he is feeling more alert post-operatively   Most recent TSH on 1/20/2021 noted to be 1.931  No vitamin B 12 level noted in epic  Consider checking TSH and vitamin B 12 levels on routine labs   Would recommend supplementing vitamin B 12 if < 400  CT of the head on 11/20/2024 revealed chronic small vessel ischemic changes, old right basal ganglia lacunar infarct, and small old infarct in the right occipital lobe   No MoCA or cognitive testing noted in epic  Maintain delirium precautions as discussed below  Redirect and reorient as needed  Keep physically, mentally, and socially active     Delirium Precautions   Baseline mentation: alert and oriented x 4 with forgetfulness/short term cognitive deficits   Current mentation: alert and oriented x 4  Patient is at high risk secondary to age, possible underlying cognitive impairment, status post left TCAR, anesthesia, acute pain, vision impairment, hearing impairment, and hospitalization   Maintain delirium precautions   Provide redirection, reorientation, and distraction techniques  Maintain fall and safety precautions   Assist with ADLs/IADLs  Avoid deliriogenic medications such as tramadol, benzodiazepines, anticholinergics, benadryl  Treat pain using geriatric pain protocol   Encourage oral hydration and nutrition   Monitor for constipation and urinary retention   Implement sleep hygiene and limit night time interuptions   Maintain sleep-wake cycle   Encourage early and frequent mobilization   Most recent EKG on 1/31/2025 revealed a QTc interval of 445  If  all other interventions are unsuccessful for acute agitation and behaviors, can consider Zyprexa 2.5 mg IM Q 8 hours prn   Would recommend repeat EKG if patient is requiring this medication   Would avoid benzodiazepines such as Ativan as these can worsen delirium     Deconditioning   Baseline function: independent with ADLs and most IADLs  Patient is at increased risk for deconditioning secondary to possible underlying cognitive impairment, status post left TCAR, anesthesia, acute pain, vision impairment, hearing impairment, and hospitalization   Continue to optimize diet, hydration, and mobility for healing   GFR 79 on 4/3/2025  Patient has no documented history of CKD   Would recommend checking renal function on AM labs  Keep hydrated   Monitor for signs and symptoms of infection, dehydration, DVT, and skin breakdown    Frailty   Clinical Frail Scale: 5- Mildly Frail  More evident slowing, needs help high order IADLs (transport, bills, medications)  Progressively impairs shopping and walking outside alone, meal prep and housework  Most recent albumin on 1/31/2025 noted to be 4.6  Consider nutrition consult  Encourage protein supplementation     Ambulatory Dysfunction/Falls  Patient notes no recent falls at home   He states he does not ambulate with any assistive devices at baseline   PT/OT consulted to assist with strengthening/mobility and assist with discharge planning to appropriate level of care  Assess patient frequently for physical needs, encourage use of assistant devices as needed and directed by PT/OT  Identify cognitive and physical deficits and behaviors that affect risk of falls  Consider moving patient closer to nursing station to monitor more closely for impulsive behavior which may increase risk of falls  [place] fall and safety precautions   Educate patient/family on patient safety including physical limitations and importance of using call bell for assistance   Modify environment to reduce risk  of injury including disconnecting from pole when not in use, ensuring adequate lighting in room and restroom, ensuring that path to restroom is clear and free of trip hazards  Out of bed as tolerated    Impaired Vision   Patient notes he has a history of macular degeneration   He does wear eyeglasses at baseline   Recommend use of corrective lenses at all appropriate times  Encourage adequate lighting and encourage use of assistance with ambulation  Keep personal belongings close to avoid reaching  Encourage appropriate footwear at all times  Recommend large font for printed materials provided to patient    Impaired Hearing   Patient has a documented history of bilateral hearing loss  He does wear hearing aids at baseline   Hearing impairment strongly correlated with depression, cognitive impairment, delirium and falls in the older adult  Use hearing aids or sound amplifier  Speak face to face  Use clear dictation and enunciation of words    Dentition/Appetite   Patient admits to denture use   He states he has a very good appetite at baseline   Encourage use of dentures at all appropriate times  Ensure meal consistency is appropriate for all abilities   Consider nutrition consult   Continue aspiration precautions     Elimination   Patient is continent of bowel and bladder at baseline  He does have a documented history of BPH  He is on tamsulosin at baseline   Patient notes he used to have difficulties with constipation   He does not currently take anything to help with bowel movements at this time   Patient is at increased risk for constipation secondary to history of constipation, anesthesia, and decreased mobility   Current bowel regimen includes   Senna 8.6 mg daily   Monitor for constipation and urinary retention     Insomnia   Patient notes no difficulty sleeping at baseline   First line is behavioral therapy   Avoid sedative hypnotics including benzodiazepines and benadryl  Encourage staying awake during the  day   Encourage daytime activities and morning exercise   Decrease or eliminate daytime naps   Avoid caffeine especially during late afternoon and evening hours  Establish a nighttime routine  Implement sleep hygiene and limit nighttime interruptions  Can consider melatonin 3 mg daily at bedtime for sleep if needed     Anxiety/Depression  Patient has no documented history of anxiety or depression   He is on wellbutrin at baseline   Mood appears stable on exam today   Continue supportive care     Asymptomatic Left Carotid Artery Stenosis  Patient has been following with vascular surgery for high grade left carotid stenosis   He was recommended for intervention which he was initially reluctant about but later decided to proceed  He is now POD 0 from a left angioplasty carotid artery with stent   No intraoperative complications were noted  Patient is in the ICU for close monitoring post-operatively   Vascular surgery recommending management with aspirin, plavix, and atorvastatin   Pain control as discussed below  Management per vascular surgery     Acute Pain due to Surgery   Patient is POD 0 from a left angioplasty carotid artery with stent   Patient notes some incisional pain on exam today   Would recommend treating pain using the geriatric pain protocol as discussed below   Acetaminophen 975 mg po Q8 hours around-the-clock  Oxycodone 2.5 mg po Q 4 hours prn for moderate pain  Oxycodone 5 mg po Q 4 hours prn for severe pain   Dilaudid 0.2 mg IV Q 4 hours prn for breakthrough pain   Monitor for constipation   Continue nonpharmacological methods of pain management     Home Safety  Patient resides at home with his wife   He is independent with ADLs and most IADls  He no longer drives due to macular degeneration     Advanced Care Planning  Prior    Home Medication Review   [facility] ([phone])  Amlodipine 5 mg daily   Atorvastatin 40 mg daily   Wellbutrin 150 mg daily   Clopidogrel 75 mg daily   Losartan 100  mg daily   Propranolol 20 mg   Tamsulosin 0.4 mg daily   Donepezil 10 mg daily at bedtime     I have personally reviewed this medication list with the patients pharmacy listed above.       History of Present Illness   Physician Requesting Consult: Sven Barajas DO  Reason for Consult / Principal Problem: Geriatric Assessment  Hx and PE limited by: N/A    HPI: Joe Aviles is a 84 y.o. year old male who has essential tremor, hyperlipidemia, memory loss, hypertension, PVD, history of stroke, and BPH and presented to the hospital for an elective vascular procedure. The patient has been following with vascular surgery for high grade left carotid artery stenosis. He was initially reluctant to undergo intervention but was later agreeable. The patient is now POD 0 from a left carotid artery angioplasty with stent. No intraoperative complications were noted. He is being closely monitored in the ICU post-operatively. Geriatrics is being consulted for a geriatric assessment.     The patient states that he resides at home with his wife. He states that he helps with cooking and cleaning. He notes that he primarily manages the finances and his wife manages his medication. He notes he no longer drives due to macular degeneration. He notes that he does have difficulties with his memory at home and notes that he feels as though he has the beginning stages of dementia. He notes no recent falls and states he does not ambulate with any assistive devices at baseline. He does have a history of macular degeneration and wears eyeglasses. He notes he does have hearing impairment and wears hearing aids. He does wear dentures and notes that he has a good appetite at baseline. He notes that he he used to have difficulty with constipation but now goes when he wakes up. He notes no issues or difficulties sleeping at home.     The patient was seen and evaluated today at the bedside for geriatric consult. He is noted to be lying in bed  comfortably in no acute distress. He is alert and oriented x 4 and states he feels as though he is more alert since his procedure this morning. He is complaining of some incisional pain but notes it is tolerable. He denies chest pain and shortness of breath.     Care was coordinated with patients nurse Destinee. She notes she is trying to bring down his blood pressure. No other issues or concerns noted.     Inpatient consult to Gerontology  Consult performed by: RODRIGUEZ Westbrook  Consult ordered by: RODRIGUEZ Waters        Review of Systems   Constitutional:  Positive for activity change. Negative for appetite change and fatigue.   HENT:  Positive for dental problem (dentures) and hearing loss (hearing aids).    Eyes:  Positive for visual disturbance (hx of macular degeneration (glasses)).   Respiratory:  Negative for cough and shortness of breath.    Cardiovascular:  Negative for chest pain and leg swelling.   Gastrointestinal:  Negative for abdominal distention and abdominal pain.   Genitourinary:  Negative for difficulty urinating and dysuria.   Musculoskeletal:  Negative for arthralgias and gait problem.   Skin:  Negative for color change and pallor.   Neurological:  Negative for speech difficulty and weakness.   Psychiatric/Behavioral:  Negative for agitation and confusion. The patient is not nervous/anxious.        Historical Information   Past Medical History:   Diagnosis Date    BPH (benign prostatic hyperplasia)     Colon polyp     Dementia (HCC)     Diverticulitis     Hypertension     Macular degeneration     poor vision    Shortness of breath     long term smoker    Stroke (HCC)     TIA x 7, LUE weakness/numbness    Wears dentures     Wears hearing aid in both ears      Past Surgical History:   Procedure Laterality Date    COLONOSCOPY      CORONARY ANGIOPLASTY WITH STENT PLACEMENT      CT CYSTOGRAM  1/14/2022    CYSTOSCOPY      IR CAROTID STENT  5/7/2025    CT TCAT IV STENT CRV CRTD ART EMBOLIC  "PROTECJ Right 05/22/2024    Procedure: ANGIOPLASTY ARTERY CAROTID W/ STENT;  Surgeon: Sven Barajas DO;  Location: [facility];  Service: Vascular    PROSTATE SURGERY      TONSILLECTOMY      VASECTOMY       Social History   Social History     Substance and Sexual Activity   Alcohol Use Not Currently     Social History     Substance and Sexual Activity   Drug Use No     Social History     Tobacco Use   Smoking Status Every Day    Types: Cigarettes   Smokeless Tobacco Never   Tobacco Comments    Down to 1/2 ppd as of 4/2025. Last smoked today 5/7       Family History:   Family History   Problem Relation Age of Onset    Brain cancer Mother     Cancer Brother         Bladder    Brain cancer Child          No Known Allergies    Objective   Vitals: Blood pressure 130/76, pulse 74, temperature (!) 97.2 °F (36.2 °C), temperature source Oral, resp. rate 19, height 5' 10\" (1.778 m), weight 69.4 kg (153 lb), SpO2 93%.,Body mass index is 21.95 kg/m².      Physical Exam  Vitals and nursing note reviewed.   Constitutional:       General: He is not in acute distress.     Appearance: He is not ill-appearing.   HENT:      Head: Normocephalic.      Mouth/Throat:      Mouth: Mucous membranes are dry.   Eyes:      General: No scleral icterus.     Conjunctiva/sclera: Conjunctivae normal.   Neck:      Comments: Left neck incisions CDI  Mild incisional edema  Cardiovascular:      Rate and Rhythm: Normal rate and regular rhythm.   Pulmonary:      Effort: Pulmonary effort is normal. No respiratory distress.   Abdominal:      General: Bowel sounds are normal. There is no distension.      Palpations: Abdomen is soft.      Tenderness: There is no abdominal tenderness.   Musculoskeletal:      Right lower leg: No edema.      Left lower leg: No edema.   Skin:     General: Skin is warm and dry.   Neurological:      General: No focal deficit present.      Mental Status: He is alert and oriented to person, place, and time. Mental status is at " "baseline.         Lab Results:            Invalid input(s): \"LABALBU\"    Imaging Studies: Results Review Statement: I reviewed CT of the head from 11/20/2024.   EKG, Pathology, and Other Studies: Results Review Statement: I reviewed most recent labs and most recent EKG.   VTE Prophylaxis: VTE covered by:  heparin (porcine), Subcutaneous, 5,000 Units at 05/07/25 2137       Code Status: Prior      I have spent a total time of 75 minutes in caring for this patient on the day of the visit/encounter including Instructions for management, Risk factor reductions, Counseling / Coordination of care, Documenting in the medical record, Reviewing/placing orders in the medical record (including tests, medications, and/or procedures), Obtaining or reviewing history  , and Communicating with other healthcare professionals .      Please note:  Voice-recognition software may have been used in the preparation of this document.  Occasional wrong word or \"sound-alike\" substitutions may have occurred due to the inherent limitations of voice recognition software.  Interpretation should be guided by context.    "

## 2025-05-08 ENCOUNTER — DOCUMENTATION (OUTPATIENT)
Dept: VASCULAR SURGERY | Facility: CLINIC | Age: 85
End: 2025-05-08

## 2025-05-08 ENCOUNTER — TELEPHONE (OUTPATIENT)
Age: 85
End: 2025-05-08

## 2025-05-08 VITALS
HEART RATE: 68 BPM | SYSTOLIC BLOOD PRESSURE: 163 MMHG | OXYGEN SATURATION: 94 % | DIASTOLIC BLOOD PRESSURE: 74 MMHG | BODY MASS INDEX: 21.9 KG/M2 | TEMPERATURE: 98 F | HEIGHT: 70 IN | WEIGHT: 153 LBS | RESPIRATION RATE: 24 BRPM

## 2025-05-08 LAB
ANION GAP SERPL CALCULATED.3IONS-SCNC: 5 MMOL/L (ref 4–13)
BUN SERPL-MCNC: 14 MG/DL (ref 5–25)
CALCIUM SERPL-MCNC: 8.3 MG/DL (ref 8.4–10.2)
CHLORIDE SERPL-SCNC: 111 MMOL/L (ref 96–108)
CO2 SERPL-SCNC: 24 MMOL/L (ref 21–32)
CREAT SERPL-MCNC: 0.89 MG/DL (ref 0.6–1.3)
ERYTHROCYTE [DISTWIDTH] IN BLOOD BY AUTOMATED COUNT: 14.1 % (ref 11.6–15.1)
GFR SERPL CREATININE-BSD FRML MDRD: 79 ML/MIN/1.73SQ M
GLUCOSE SERPL-MCNC: 106 MG/DL (ref 65–140)
HCT VFR BLD AUTO: 37.2 % (ref 36.5–49.3)
HGB BLD-MCNC: 13.1 G/DL (ref 12–17)
MCH RBC QN AUTO: 33.2 PG (ref 26.8–34.3)
MCHC RBC AUTO-ENTMCNC: 35.2 G/DL (ref 31.4–37.4)
MCV RBC AUTO: 94 FL (ref 82–98)
PLATELET # BLD AUTO: 114 THOUSANDS/UL (ref 149–390)
PMV BLD AUTO: 11.8 FL (ref 8.9–12.7)
POTASSIUM SERPL-SCNC: 3.7 MMOL/L (ref 3.5–5.3)
RBC # BLD AUTO: 3.94 MILLION/UL (ref 3.88–5.62)
SODIUM SERPL-SCNC: 140 MMOL/L (ref 135–147)
WBC # BLD AUTO: 9.07 THOUSAND/UL (ref 4.31–10.16)

## 2025-05-08 PROCEDURE — 97166 OT EVAL MOD COMPLEX 45 MIN: CPT

## 2025-05-08 PROCEDURE — 99238 HOSP IP/OBS DSCHRG MGMT 30/<: CPT | Performed by: INTERNAL MEDICINE

## 2025-05-08 PROCEDURE — 85027 COMPLETE CBC AUTOMATED: CPT | Performed by: SURGERY

## 2025-05-08 PROCEDURE — 80048 BASIC METABOLIC PNL TOTAL CA: CPT | Performed by: SURGERY

## 2025-05-08 PROCEDURE — NC001 PR NO CHARGE: Performed by: SURGERY

## 2025-05-08 PROCEDURE — 99024 POSTOP FOLLOW-UP VISIT: CPT | Performed by: SURGERY

## 2025-05-08 RX ORDER — POTASSIUM CHLORIDE 1500 MG/1
40 TABLET, EXTENDED RELEASE ORAL ONCE
Status: COMPLETED | OUTPATIENT
Start: 2025-05-08 | End: 2025-05-08

## 2025-05-08 RX ORDER — POTASSIUM CHLORIDE 20MEQ/15ML
40 LIQUID (ML) ORAL ONCE
Status: DISCONTINUED | OUTPATIENT
Start: 2025-05-08 | End: 2025-05-08

## 2025-05-08 RX ADMIN — ASPIRIN 81 MG: 81 TABLET, CHEWABLE ORAL at 09:19

## 2025-05-08 RX ADMIN — AMLODIPINE BESYLATE 5 MG: 5 TABLET ORAL at 09:19

## 2025-05-08 RX ADMIN — CLOPIDOGREL BISULFATE 75 MG: 75 TABLET ORAL at 09:19

## 2025-05-08 RX ADMIN — ACETAMINOPHEN 975 MG: 325 TABLET, FILM COATED ORAL at 05:29

## 2025-05-08 RX ADMIN — PROPRANOLOL HYDROCHLORIDE 20 MG: 20 TABLET ORAL at 09:22

## 2025-05-08 RX ADMIN — POTASSIUM CHLORIDE 40 MEQ: 1500 TABLET, EXTENDED RELEASE ORAL at 09:20

## 2025-05-08 RX ADMIN — HEPARIN SODIUM 5000 UNITS: 5000 INJECTION INTRAVENOUS; SUBCUTANEOUS at 05:29

## 2025-05-08 NOTE — PROGRESS NOTES
Vascular Nurse Navigator Post Op Education    Met with patient and wife Shana at bedside to introduce myself as Vascular Nurse Navigator and explained my role.  Patient is appropriate and accepting to education. Patient was educated with Review of written materials provided, Teachback, Explanation, Demonstration, and Question & Answer on expectations of post op care and recovery on Left TCAR. Patient is a smoker  (1/2 ppd x 60 yrs), as such Smoking effects on the lungs, tobacco triggers, and Smoking cessation was reviewed. Education provided to patient and his wife Shana on infection prevention, activity limitations, when to call the office, importance of follow up, and incisional care.  Discharge instruction handout provided to patient to review.        Tobacco use is a significant patient-modifiable risk factor for this patient’s vascular disease with multiple vascular comorbidities, and a significant risk factor for failure of and complications from any endovascular or surgical interventions.    I explained to the patient the effects of smoking including peripheral artery disease, coronary artery disease, cerebrovascular disease as well as cancer and chronic obstructive pulmonary disease. I asked the patient to stop smoking immediately. It is never too late to quit, and many studies show significant health benefits as well as economical savings after smoking cessation. I offered to the patient nicotine replacement therapy as well as referral to the smoking cessation program and access to the quit line 7-410-UNTDXJP or ambulatory referral to our network smoking cessation program.    Based on our conversation, this patient expresses a passive interest in quitting but does not appear very motivated to quit  And declined my offer of nicotine replacement or tobacco cessation medications    The patient did not set a quit date.     I spent approximately 5 minutes on tobacco cessation counseling with this patient.

## 2025-05-08 NOTE — ASSESSMENT & PLAN NOTE
Pt is a 85 y/o male smoker with PMH of asymptomatic carotid stenosis S/P R TCAR (5/22/24), BPH, dementia, HTN, macular degeneration who presnted to [facility] on 5/7 for an elective L TCAR with Dr. Barajas.     POD#1 s/p L TCAR 5/7: No intra/post-op complications. Pt is OOB, without a mendez and jennifer removed. He has been eating, voiding and ambulating. Pt is neurologically at baseline without focal defects. Pt is hemodynamically stable with appropriate CBC and BMP labs.     Plan:  -s/p L TCAR 5/7  -OOB and ambulate as tolerated  -Regular diet  -Cont ASA, plavix, and lipitor  -Critical care following for medical management, input appreciated  -Pt is stable for discharge home today from a vascular standpoint  -FU appt scheduled for 5/14  -No driving until cleared at the outpt FU  -Restrict wt to less than 15 pounds  -Discussed with Dr. Barajas

## 2025-05-08 NOTE — PHYSICAL THERAPY NOTE
Physical Therapy Screen    Patient Name: Joe Aviles    Today's Date: 5/8/2025     Problem List  Principal Problem:    Asymptomatic stenosis of left carotid artery  Active Problems:    Essential hypertension    Dementia (HCC)       Past Medical History  Past Medical History:   Diagnosis Date    BPH (benign prostatic hyperplasia)     Colon polyp     Dementia (HCC)     Diverticulitis     Hypertension     Macular degeneration     poor vision    Shortness of breath     long term smoker    Stroke (HCC)     TIA x 7, LUE weakness/numbness    Wears dentures     Wears hearing aid in both ears         Past Surgical History  Past Surgical History:   Procedure Laterality Date    COLONOSCOPY      CORONARY ANGIOPLASTY WITH STENT PLACEMENT      CT CYSTOGRAM  1/14/2022    CYSTOSCOPY      IR CAROTID STENT  5/7/2025    [place] TCAT IV STENT CRV CRTD ART EMBOLIC PROTECJ Right 05/22/2024    Procedure: ANGIOPLASTY ARTERY CAROTID W/ STENT;  Surgeon: Sven Barajas DO;  Location[facility];  Service: Vascular    [place] TCAT IV STENT CRV CRTD ART EMBOLIC PROTECJ Left 5/7/2025    Procedure: ANGIOPLASTY ARTERY CAROTID W/ STENT;  Surgeon: Sven Barajas DO;  Location: [facility];  Service: Vascular    PROSTATE SURGERY      TONSILLECTOMY      VASECTOMY           05/08/25 1045   PT Last Visit   PT Visit Date 05/08/25   Note Type   Note type Screen   Additional Comments PT screen completed. Per OT, pt functioning at baseline. Please see OT note for details. No skilled IPPT warranted at this time. Will D/C PT.     Tito Ayala

## 2025-05-08 NOTE — OCCUPATIONAL THERAPY NOTE
Occupational Therapy Evaluation     Patient Name: Joe Aviles  Today's Date: 5/8/2025  Problem List  Principal Problem:    Asymptomatic stenosis of left carotid artery  Active Problems:    Essential hypertension    Dementia (HCC)    Past Medical History  Past Medical History:   Diagnosis Date    BPH (benign prostatic hyperplasia)     Colon polyp     Dementia (HCC)     Diverticulitis     Hypertension     Macular degeneration     poor vision    Shortness of breath     long term smoker    Stroke (HCC)     TIA x 7, LUE weakness/numbness    Wears dentures     Wears hearing aid in both ears      Past Surgical History  Past Surgical History:   Procedure Laterality Date    COLONOSCOPY      CORONARY ANGIOPLASTY WITH STENT PLACEMENT      CT CYSTOGRAM  1/14/2022    CYSTOSCOPY      IR CAROTID STENT  5/7/2025    AR TCAT IV STENT CRV CRTD ART EMBOLIC PROTECJ Right 05/22/2024    Procedure: ANGIOPLASTY ARTERY CAROTID W/ STENT;  Surgeon: Sven Barajas DO;  [facility]: [facility];  Service: Vascular    AR TCAT IV STENT CRV CRTD ART EMBOLIC PROTECJ Left 5/7/2025    Procedure: ANGIOPLASTY ARTERY CAROTID W/ STENT;  Surgeon: Sven Barajas DO;  Location: [facility];  Service: Vascular    PROSTATE SURGERY      TONSILLECTOMY      VASECTOMY             05/08/25 0846   Note Type   Note type Evaluation   Pain Assessment   Pain Assessment Tool 0-10   Pain Score 2   Pain Location/Orientation Orientation: Left;Location: Neck   Pain Rating: FLACC (Rest) - Face 0   Pain Rating: FLACC (Rest) - Legs 0   Pain Rating: FLACC (Rest) - Activity 0   Pain Rating: FLACC (Rest) - Cry 0   Pain Rating: FLACC (Rest) - Consolability 0   Score: FLACC (Rest) 0   Restrictions/Precautions   Weight Bearing Precautions Per Order No   Other Precautions Multiple lines;Pain   Home Living   Type of Home House   Home Layout One level  (0 ashley)   Bathroom Shower/Tub Walk-in shower   Bathroom Toilet Standard   Home Equipment Walker;Cane   Prior Function   Level  "of [place] Independent with ADLs;Independent with functional mobility   Lives With Spouse   Falls in the last 6 months 0   Lifestyle   Autonomy PTA pt states independence with all aspects of his ADLs, transfers, ambulation--w/o device; neg , neg falls   Reciprocal Relationships supportive wife   Service to Others retired   Intrinsic Gratification wood-working   Subjective   Subjective \"I'm feeling better today.\"   ADL   Where Assessed Edge of bed   Eating Assistance 6  Modified independent   Grooming Assistance 6  Modified Independent   UB Bathing Assistance 6  Modified Independent   LB Bathing Assistance 6  Modified Independent   UB Dressing Assistance 6  Modified independent   LB Dressing Assistance 6  Modified independent   Bed Mobility   Rolling R 7  Independent   Rolling L 7  Independent   Supine to Sit 7  Independent   Transfers   Sit to Stand 7  Independent   Stand to Sit 7  Independent   Functional Mobility   Functional Mobility 7  Independent   Additional items   (w/o device)   Balance   Static Sitting Normal   Dynamic Sitting Good   Static Standing Good   Dynamic Standing Fair +   Activity Tolerance   Activity Tolerance Patient tolerated treatment well   Medical Staff Made Aware nsg, P.T.   RUE Assessment   RUE Assessment WFL   RUE Strength   RUE Overall Strength Within Functional Limits - able to perform ADL tasks with strength  (4/5 throughout)   LUE Assessment   LUE Assessment WFL   LUE Strength   LUE Overall Strength Within Functional Limits - able to perform ADL tasks with strength  (4/5 throughout)   Hand Function   Gross Motor Coordination Functional   Fine Motor Coordination Functional   Proprioception   Proprioception No apparent deficits   Vision-Basic Assessment   Current Vision   (glasses)   Visual History Macular degeneration   Vision - Complex Assessment   Acuity   (impaired)   Psychosocial   Psychosocial (WDL) WDL   Perception   Inattention/Neglect Appears intact   Cognition " "  Overall Cognitive Status WFL   Arousal/Participation Alert   Attention Within functional limits   Orientation Level Oriented X4   Memory Within functional limits   Following Commands Follows all commands and directions without difficulty   Comments hx dementia   Assessment   Limitation Decreased UE strength;Decreased endurance;Decreased high-level ADLs   Prognosis Good   Assessment Pt is a 85y/o male admitted to the hospital for an elected s/p L ANGIOPLASTY ARTERY CAROTID W/ STENT(5/7) . Pt with PMH BPH, dementia, HTN, CVA, prostate Ca, CAD--stenting. PTA pt states independence with all aspects of his ADLs, transfers, ambulation--w/o device; neg , neg falls. During initial eval, pt demonstrated slight deficits with his functional balance, activity tolerance, and visual perception. Pt was able to demonstrate good ADL status and states no concerns about going directly home. Pt would benefit from a restorative program on the unit to improve his overall endurance, balance, and mobility. Acute OT tx not indicated at this time 2* pt's limited functional deficits. The patient's raw score on the AM-PAC Daily Activity Inpatient Short Form is 24. A raw score of greater than or equal to 19 suggests the patient may benefit from discharge to home. Please refer to the recommendation of the Occupational Therapist for safe discharge planning.   Goals   Patient Goals \"to go home\"   Plan   OT Frequency Eval only   AM-PAC Daily Activity Inpatient   Lower Body Dressing 4   Bathing 4   Toileting 4   Upper Body Dressing 4   Grooming 4   Eating 4   Daily Activity Raw Score 24   Daily Activity Standardized Score (Calc for Raw Score >=11) 57.54   AM-PAC Applied Cognition Inpatient   Following a Speech/Presentation 4   Understanding Ordinary Conversation 4   Taking Medications 4   Remembering Where Things Are Placed or Put Away 4   Remembering List of 4-5 Errands 4   Taking Care of Complicated Tasks 4   Applied Cognition Raw Score 24 "   Applied Cognition Standardized Score 62.21   Jass Cunningham

## 2025-05-08 NOTE — DISCHARGE SUMMARY
Discharge Summary   Joe Aviles 84 y.o. male MRN: [id number]  Unit/Bed#: ICU 13 Encounter: [id number]    Admission Date: 5/7/2025     Discharge Date:05/08/25    Attending:Sven Barajas DO     Consultants: Critical care    Admitting Diagnosis: Asymptomatic carotid artery stenosis, left [I65.22]    Principle/ Secondary Diagnosis:  Past Medical History:   Diagnosis Date    BPH (benign prostatic hyperplasia)     Colon polyp     Dementia (HCC)     Diverticulitis     Hypertension     Macular degeneration     poor vision    Shortness of breath     long term smoker    Stroke (HCC)     TIA x 7, LUE weakness/numbness    Wears dentures     Wears hearing aid in both ears      Past Surgical History:   Procedure Laterality Date    COLONOSCOPY      CORONARY ANGIOPLASTY WITH STENT PLACEMENT      CT CYSTOGRAM  1/14/2022    CYSTOSCOPY      IR CAROTID STENT  5/7/2025    HI TCAT IV STENT CRV CRTD ART EMBOLIC PROTECJ Right 05/22/2024    Procedure: ANGIOPLASTY ARTERY CAROTID W/ STENT;  Surgeon: Sven Barajas DO;  [facility];  Service: Vascular    HI TCAT IV STENT CRV CRTD ART EMBOLIC PROTECJ Left 5/7/2025    Procedure: ANGIOPLASTY ARTERY CAROTID W/ STENT;  Surgeon: Sven Barajas DO;  Location[facility];  Service: Vascular    PROSTATE SURGERY      TONSILLECTOMY      VASECTOMY         Procedures Performed:     HI TCAT IV STENT CRV CRTD ART EMBOLIC PROTECJ [[id number]] (ANGIOPLASTY ARTERY CAROTID W/ STENT)  ANGIOPLASTY ARTERY CAROTID W/ STENT (Left: Neck)  Procedure(s):  ANGIOPLASTY ARTERY CAROTID W/ STENT    Laboratory data at discharge:   Results from last 7 days   Lab Units 05/08/25  0522   WBC Thousand/uL 9.07   HEMOGLOBIN g/dL 13.1   HEMATOCRIT % 37.2   PLATELETS Thousands/uL 114*     Results from last 7 days   Lab Units 05/08/25  0522   POTASSIUM mmol/L 3.7   CHLORIDE mmol/L 111*   CO2 mmol/L 24   BUN mg/dL 14   CREATININE mg/dL 0.89   CALCIUM mg/dL 8.3*               Discharge instructions/Information to  patient and family:   See after visit summary for information provided to patient and family.     Discharge Medications:  See after visit summary for reconciled discharge medications provided to patient and family.      Hospital Course:   Pt is a 85 y/o male smoker with PMH of asymptomatic carotid stenosis S/P R TCAR (5/22/24), BPH, dementia, HTN, macular degeneration who presnted to [facility] on 5/7 for an elective L TCAR with Dr. Barajas.     POD#1 s/p L TCAR 5/7: No intra/post-op complications. Pt is OOB, without a mendez and jennifer removed. He has been eating, voiding and ambulating. Pt is neurologically at baseline without focal defects. Pt is hemodynamically stable with appropriate CBC and BMP labs. He denies vision loss, speech changes, difficulty swallowing, facial/extremity weakness or numbness, CP, SOB, abdominal pain, N/V.     Hospital course was complicated by the following: none    Tobacco use is a significant patient-modifiable risk factor for this patient’s vascular disease with multiple vascular comorbidities, and a significant risk factor for failure of and complications from any endovascular or surgical interventions.    I explained to the patient the effects of smoking including peripheral artery disease, coronary artery disease, cerebrovascular disease as well as cancer and chronic obstructive pulmonary disease. I asked the patient to stop smoking immediately. It is never too late to quit, and many studies show significant health benefits as well as economical savings after smoking cessation. I offered to the patient nicotine replacement therapy as well as referral to the smoking cessation program and access to the quit line 8-161-WHFVCKC or ambulatory referral to our network smoking cessation program.    Based on our conversation, this patient expresses a passive interest in quitting but does not appear very motivated to quit  And plans to use nicotine replacement to help quit    The patient did not  set a quit date. I will continue to  follow up on this issue at our next scheduled visit.     I spent approximately 5 minutes on tobacco cessation counseling with this patient.     Prior to discharge, the patient was given instructions for outpatient care and follow-up.  The patient has been instructed to call w/ any questions, changes, or concerns for the medical condition.    For further details of the hospitalization, please refer to the medical record.    Condition at Discharge: good     Provisions for Follow-Up Care:  See after visit summary for information related to follow-up care and any pertinent home health orders.      Disposition: Home    Planned Readmission: No    Discharge Statement   I spent <30 minutes discharging the patient. This time was spent on the day of discharge. I had direct contact with the patient on the day of discharge. Additional documentation is required if more than 30 minutes were spent on discharge.     Enriqueta Delgado PA-C  5/8/2025      This text is generated with voice recognition software. There may be translation, syntax,  or grammatical errors. If you have any questions, please contact the dictating provider.

## 2025-05-08 NOTE — DISCHARGE SUMMARY
Discharge Summary - Critical Care/ICU   Name: Joe Aviles 84 y.o. male I MRN: [id number]  Unit/Bed#: ICU 13 I Date of Admission: 5/7/2025   Date of Service: 5/8/2025 I Hospital Day: 1    Medical Problems       Resolved Problems  Date Reviewed: 5/7/2025   None       Discharging Physician / Practitioner: Lona Camarena MD  PCP: Jayme Wolff MD  Admission Date:   Admission Orders (From admission, onward)       Ordered        05/07/25 1008  Inpatient Admission  Once                          Discharge Date: 05/08/25    Consultations During Hospital Stay:  Vascular surgery  Pediatric medicine    Procedures Performed:   Left transcarotid artery revascularization    Significant Findings / Test Results:   Moderate to high-grade carotid bulb/proximal ICA stenosis.  Vessel dilated with stent placement.    Incidental Findings:   None    Test Results Pending at Discharge (will require follow up):   None     Outpatient Tests Requested:  None    Complications: None    Reason for Admission: S/p left TCAR    Hospital Course:   Joe Aviles is a 84 y.o. male patient who originally presented to the hospital on 5/7/2025 due to left TCAR.    Assessment & Plan  Asymptomatic stenosis of left carotid artery  S/P left TCAR, POD #1  Incision site negative for signs of infection.  Hemodynamically stable.  Ambulating on unit.  Continue aspirin 81 mg daily, atorvastatin 40 mg daily, clopidogrel 75 mg daily on discharge.  Importance of smoking cessation discussed.  Follow-up with PCP for resources.  Essential hypertension  Start home medications: Losartan 100 mg daily, amlodipine 5 mg daily.    Dementia (HCC)  Continue aricept      The patient, initially admitted to the hospital as inpatient, was discharged earlier than expected given the following: Tolerated surgical procedure well, hemodynamically stable, ambulating without difficulty on the unit.  Cleared for discharge by vascular surgery..    Please see above list of diagnoses and  "related plan for additional information.     Condition at Discharge: stable    Discharge Day Visit / Exam:   Subjective: POD #1  Vitals remained within normal range overnight.  Patient remained afebrile saturating adequately on room air.  Labs today significant for mild hypokalemia, will replete.  Patient achieving adequate urine output.  Medically optimized for discharge today.      Vitals: Blood Pressure: 141/72 (05/08/25 0750)  Pulse: 60 (05/08/25 0750)  Temperature: 98 °F (36.7 °C) (05/08/25 0750)  Temp Source: Oral (05/08/25 0750)  Respirations: 19 (05/08/25 0750)  Height: 5' 10\" (177.8 cm) (05/07/25 1200)  Weight - Scale: 69.4 kg (153 lb) (05/07/25 1200)  SpO2: 94 % (05/08/25 0750)    Physical Exam  Vitals reviewed.   Constitutional:       General: He is not in acute distress.     Appearance: Normal appearance. He is well-developed. He is not ill-appearing.   HENT:      Mouth/Throat:      Mouth: Mucous membranes are moist.   Eyes:      Extraocular Movements: Extraocular movements intact.   Cardiovascular:      Rate and Rhythm: Normal rate and regular rhythm.      Pulses: Normal pulses.           Radial pulses are 2+ on the right side.      Heart sounds: Normal heart sounds.   Pulmonary:      Effort: Pulmonary effort is normal. No respiratory distress.      Breath sounds: Normal breath sounds.   Chest:      Chest wall: No tenderness.   Abdominal:      General: Bowel sounds are normal. There is no distension.      Palpations: Abdomen is soft.      Tenderness: There is no abdominal tenderness.   Musculoskeletal:      Cervical back: Normal range of motion.      Right lower leg: No edema.      Left lower leg: No edema.   Skin:     General: Skin is warm and dry.      Capillary Refill: Capillary refill takes less than 2 seconds.   Neurological:      Mental Status: He is alert and oriented to person, place, and time.      Gait: Gait normal.   Psychiatric:         Mood and Affect: Mood normal.          Discharge " instructions/Information to patient and family:   See after visit summary for information provided to patient and family.      Provisions for Follow-Up Care:  See after visit summary for information related to follow-up care and any pertinent home health orders.      Mobility at time of Discharge:   Basic Mobility Inpatient Raw Score: 23  JH-HLM Goal: 7: Walk 25 feet or more  JH-HLM Achieved: 7: Walk 25 feet or more  HLM Goal achieved. Continue to encourage appropriate mobility.     Disposition:   Home    Planned Readmission: No    Discharge Medications:  See after visit summary for reconciled discharge medications provided to patient and/or family.      Administrative Statements   Discharge Statement:  I have spent a total time of 60 minutes in caring for this patient on the day of the visit/encounter. .    **Please Note: This note may have been constructed using a voice recognition system**

## 2025-05-08 NOTE — PROGRESS NOTES
Progress Note - Vascular Surgery   Name: Joe Aviles 84 y.o. male I MRN: [id number]  Unit/Bed#: ICU 13 I Date of Admission: 5/7/2025   Date of Service: 5/8/2025 I Hospital Day: 1    Assessment & Plan  Asymptomatic stenosis of left carotid artery  Pt is a 83 y/o male smoker with PMH of asymptomatic carotid stenosis S/P R TCAR (5/22/24), BPH, dementia, HTN, macular degeneration who presnted to [facility] on 5/7 for an elective L TCAR with Dr. Barajas.     POD#1 s/p L TCAR 5/7: No intra/post-op complications. Pt is OOB, without a mendez and jennifer removed. He has been eating, voiding and ambulating. Pt is neurologically at baseline without focal defects. Pt is hemodynamically stable with appropriate CBC and BMP labs.     Plan:  -s/p L TCAR 5/7  -OOB and ambulate as tolerated  -Regular diet  -Cont ASA, plavix, and lipitor  -Critical care following for medical management, input appreciated  -Pt is stable for discharge home today from a vascular standpoint  -FU appt scheduled for 5/14  -No driving until cleared at the outpt FU  -Restrict wt to less than 15 pounds  -Discussed with Dr. Barajas    Subjective : Pt is sitting up in bed and had already eaten breakfast this morning. He has mild tenderness to the L TCAR incision site which is controled with medication. He denies vision loss, speech changes, facial/extremity weakness or numbness, CP, SOB, abdominal pain, N/V.     Objective :  Temp:  [96.7 °F (35.9 °C)-98.2 °F (36.8 °C)] 98 °F (36.7 °C)  HR:  [56-80] 60  BP: (108-159)/(64-88) 159/75  Resp:  [12-23] 21  SpO2:  [90 %-97 %] 97 %  O2 Device: None (Room air)     I/O         05/06 0701 05/07 0700 05/07 0701 05/08 0700 05/08 0701  05/09 0700    P.O.  420 240    I.V. (mL/kg)  1820.8 (26.2)     Total Intake(mL/kg)  2240.8 (32.3) 240 (3.5)    Urine (mL/kg/hr)  950 (0.6)     Total Output  950     Net  +1290.8 +240           Unmeasured Stool Occurrence   1 x            Physical Exam  Vitals and nursing note reviewed.    Constitutional:       General: He is not in acute distress.     Appearance: He is well-developed.   HENT:      Head: Normocephalic and atraumatic.   Eyes:      Conjunctiva/sclera: Conjunctivae normal.   Cardiovascular:      Rate and Rhythm: Normal rate and regular rhythm.      Heart sounds: Normal heart sounds. No murmur heard.     Comments: R DP and PT 2+ pulses  Pulmonary:      Effort: Pulmonary effort is normal. No respiratory distress.      Breath sounds: Normal breath sounds.   Abdominal:      Palpations: Abdomen is soft.      Tenderness: There is no abdominal tenderness.   Musculoskeletal:         General: No swelling.      Cervical back: Neck supple.   Skin:     General: Skin is warm and dry.      Capillary Refill: Capillary refill takes less than 2 seconds.      Comments: R groin incision with glue and mild ecchymosis, no edema, erythema, swelling or leakage.  L neck incision with glue w/out erythema, edema, ecchymosis, swelling, or leakage.    Neurological:      General: No focal deficit present.      Mental Status: He is alert. Mental status is at baseline.   Psychiatric:         Mood and Affect: Mood normal.         Thought Content: Thought content normal.         Judgment: Judgment normal.             Lab Results: I have reviewed the following results:  CBC with diff:   Lab Results   Component Value Date    WBC 9.07 05/08/2025    HGB 13.1 05/08/2025    HCT 37.2 05/08/2025    MCV 94 05/08/2025     (L) 05/08/2025    RBC 3.94 05/08/2025    MCH 33.2 05/08/2025    MCHC 35.2 05/08/2025    RDW 14.1 05/08/2025    MPV 11.8 05/08/2025    NRBC 0 01/31/2025      BMP/CMP:  Lab Results   Component Value Date    SODIUM 140 05/08/2025    SODIUM 144 09/23/2024    K 3.7 05/08/2025    K 4.1 09/23/2024     (H) 05/08/2025     09/23/2024    CO2 24 05/08/2025    CO2 25 09/23/2024    BUN 14 05/08/2025    BUN 16 09/23/2024    CREATININE 0.89 05/08/2025    CREATININE 1.05 09/23/2024    CALCIUM 8.3 (L)  05/08/2025    CALCIUM 9.5 09/23/2024    AST 20 01/31/2025    AST 19 09/23/2024    ALT 25 01/31/2025    ALT 27 09/23/2024    ALKPHOS 123 (H) 01/31/2025    ALKPHOS 111 09/23/2024    EGFR 79 05/08/2025    EGFR 71 09/23/2024    EGFR 69 12/11/2019

## 2025-05-08 NOTE — UTILIZATION REVIEW
Initial Clinical Review    Elective IP surgical procedure  Age/Sex: 84 y.o. male  Surgery Date: 5/7  Procedure:   Preop Diagnosis:  Asymptomatic carotid artery stenosis, left [I65.22]     Post-Op Diagnosis Codes:     * Asymptomatic carotid artery stenosis, left [I65.22]     Procedure(s):  Left - ANGIOPLASTY ARTERY CAROTID W/ STENT    Anesthesia: General   Operative Findings:   Moderate to high grade carotid bulb/proximal ICA stenoses successfully predilated using a 5 x 3 balloon followed by a 9 x 40 self-expanding stent.  Patient noted to be neurologically intact at the conclusion of procedure.      POD#1 Progress Note:   Doing well, drips d/c, ambulating, voiding, eating, VS stable.  D/c home in stable condition.     Admission Orders: Date/Time/Statement:   Admission Orders (From admission, onward)       Ordered        05/07/25 1008  Inpatient Admission  Once                          Orders Placed This Encounter   Procedures    Inpatient Admission     Standing Status:   Standing     Number of Occurrences:   1     Level of Care:   Critical Care [15]     Estimated length of stay:   More than 2 Midnights     Certification:   I certify that inpatient services are medically necessary for this patient for a duration of greater than two midnights. See H&P and MD Progress Notes for additional information about the patient's course of treatment.     Diet: regular   Mobility: ambulate  DVT Prophylaxis: SCD, sq Heparin     Medications/Pain Control:   Scheduled Medications:  acetaminophen, 975 mg, Oral, Q8H FAUSTO  amLODIPine, 5 mg, Oral, Daily  aspirin, 81 mg, Oral, Daily  atorvastatin, 40 mg, Oral, QPM  clopidogrel, 75 mg, Oral, Daily  donepezil, 10 mg, Oral, HS  heparin (porcine), 5,000 Units, Subcutaneous, Q8H FAUSTO  propranolol, 20 mg, Oral, TID  senna, 1 tablet, Oral, Daily      Continuous IV Infusions:  niCARdipine, 1-15 mg/hr, Intravenous, Continuous PRN  phenylephine,  mcg/min, Intravenous, Continuous PRN      PRN  Meds:  labetalol, 5 mg, Intravenous, Q15 Min PRN - x 1 5/7   And  hydrALAZINE, 15 mg, Intravenous, Q15 Min PRN   And  niCARdipine, 1-15 mg/hr, Intravenous, Continuous PRN  ondansetron, 4 mg, Intravenous, Q6H PRN  oxyCODONE, 5 mg, Oral, Q4H PRN  oxyCODONE, 2.5 mg, Oral, Q4H PRN  phenylephine,  mcg/min, Intravenous, Continuous PRN      Vital Signs (last 3 days) before discharge       Date/Time Temp Pulse Resp BP MAP (mmHg) Arterial Line BP MAP SpO2 O2 Flow Rate (L/min) O2 Device Patient Position - Orthostatic VS Dell Coma Scale Score Pain    05/08/25 1143 -- 68 24 163/74 114 -- -- 94 % -- -- -- -- --    05/08/25 0928 -- -- -- -- -- -- -- -- -- -- -- 15 No Pain    05/08/25 0900 -- 60 21 159/75 112 -- -- 97 % -- -- -- -- --    05/08/25 0846 -- -- -- -- -- -- -- -- -- -- -- -- 2    05/08/25 0800 -- 66 18 144/88 123 -- -- 93 % -- -- -- 15 --    05/08/25 0750 98 °F (36.7 °C) 60 19 141/72 107 -- -- 94 % -- None (Room air) -- -- --    05/08/25 0700 -- -- -- -- -- -- -- -- -- -- -- 15 --    05/08/25 0620 -- 64 19 146/83 121 -- -- 95 % -- -- Lying -- --    05/08/25 0600 -- -- -- -- -- -- -- -- -- -- -- 15 --    05/08/25 0500 -- 60 18 -- -- -- -- 91 % -- -- -- 15 --    05/08/25 0400 98.2 °F (36.8 °C) -- -- -- -- -- -- -- -- -- -- 15 --    05/08/25 0300 -- -- -- -- -- -- -- -- -- -- -- 15 --    05/08/25 0200 -- 62 17 -- -- -- -- 90 % -- -- -- 15 --    05/08/25 0100 98 °F (36.7 °C) 62 18 -- -- -- -- 91 % -- -- -- 15 --    05/08/25 0000 -- 64 19 -- -- -- -- 92 % -- -- -- 15 --    05/07/25 2300 -- 64 19 -- -- -- -- 91 % -- -- -- 15 --    05/07/25 2200 -- 62 19 -- -- -- -- 93 % -- -- -- 15 --    05/07/25 2137 -- -- -- 146/75 -- -- -- -- -- -- Lying -- No Pain    05/07/25 2100 -- 68 18 -- -- -- -- 92 % -- -- -- 15 --    05/07/25 2000 98 °F (36.7 °C) 58 19 -- -- -- -- 94 % -- -- -- 15 No Pain    05/07/25 1900 -- 56 19 -- -- -- -- 93 % -- -- -- 15 --    05/07/25 1800 -- 58 15 -- -- -- -- 93 % -- -- -- 15 --    05/07/25 1715 --  66 23 110/75 88 -- -- 93 % -- -- -- -- --    05/07/25 1711 -- 60 19 -- -- -- -- 94 % -- -- -- -- --    05/07/25 1700 -- 72 21 -- -- -- -- 91 % -- -- -- 15 --    05/07/25 1636 -- 76 20 -- -- -- -- 92 % -- -- -- -- --    05/07/25 1600 -- 60 15 -- -- -- -- 94 % -- None (Room air) -- 15 --    05/07/25 1540 -- 62 -- 155/65 -- -- -- -- -- -- -- -- --    05/07/25 1536 -- -- -- -- -- -- -- -- -- -- -- 15 --    05/07/25 1500 97.9 °F (36.6 °C) 60 13 -- -- -- -- 93 % -- None (Room air) -- -- 3    05/07/25 1400 -- 74 19 -- -- -- -- 93 % -- -- -- -- --    05/07/25 1330 -- 60 19 -- -- -- -- 94 % -- -- -- -- --    05/07/25 1307 -- -- -- -- -- -- -- -- -- -- -- -- 3    05/07/25 1300 -- 56 13 -- -- -- -- 95 % -- -- -- 15 --    05/07/25 1236 97.2 °F (36.2 °C) -- -- -- -- -- -- -- -- -- -- -- --    05/07/25 1230 97.2 °F (36.2 °C) 64 19 -- -- -- -- 91 % -- None (Room air) -- 15 3    05/07/25 1214 -- -- -- -- -- -- -- -- -- -- -- -- 3    05/07/25 1200 97.2 °F (36.2 °C) 64 15 130/76 103 -- -- 93 % -- -- -- 15 --    05/07/25 1145 96.8 °F (36 °C) 62 14 139/70 97 148/64 94 mmHg 93 % -- None (Room air) -- -- --    05/07/25 1130 96.8 °F (36 °C) 62 16 139/70 97 140/62 88 mmHg 93 % -- None (Room air) -- -- --    05/07/25 1115 -- 70 21 108/66 82 142/62 88 mmHg 93 % -- None (Room air) -- -- --    05/07/25 1100 -- 72 14 124/64 89 146/62 90 mmHg 93 % -- None (Room air) -- -- --    05/07/25 1045 -- 74 16 126/66 89 138/60 86 mmHg 92 % -- None (Room air) -- -- 3    05/07/25 1034 -- 80 12 -- -- 132/58 84 mmHg 96 % -- None (Room air) -- -- No Pain    05/07/25 1030 96.7 °F (35.9 °C) 76 12 122/65 87 126/52 78 mmHg 95 % 3 L/min Simple mask -- -- No Pain    05/07/25 0607 -- -- -- -- -- -- -- -- -- -- -- -- No Pain    05/07/25 0548 97.4 °F (36.3 °C) 66 16 160/76 109 -- -- 97 % -- None (Room air) Lying -- --          Weight (last 2 days) before discharge       Date/Time Weight    05/07/25 1200 69.4 (153)    05/07/25 0607 69.6 (153.44)            Pertinent  Labs/Diagnostic Test Results:   Radiology:  IR carotid stent   Final Interpretation by SYSTEMGENERATED, DOCUMENTATION (05/07 1041)        Cardiology:  No orders to display     GI:  No orders to display           Results from last 7 days   Lab Units 05/08/25  0522   WBC Thousand/uL 9.07   HEMOGLOBIN g/dL 13.1   HEMATOCRIT % 37.2   PLATELETS Thousands/uL 114*         Results from last 7 days   Lab Units 05/08/25  0522   SODIUM mmol/L 140   POTASSIUM mmol/L 3.7   CHLORIDE mmol/L 111*   CO2 mmol/L 24   ANION GAP mmol/L 5   BUN mg/dL 14   CREATININE mg/dL 0.89   EGFR ml/min/1.73sq m 79   CALCIUM mg/dL 8.3*             Results from last 7 days   Lab Units 05/08/25  0522   GLUCOSE RANDOM mg/dL 106     Network Utilization Review Department  ATTENTION: Please call with any questions or concerns to [phone] and carefully listen to the prompts so that you are directed to the right person. All voicemails are confidential.   For Discharge needs, contact Care Management DC Support Team at [phone] opt. 2  Send all requests for admission clinical reviews, approved or denied determinations and any other requests to dedicated fax number below belonging to the campus where the patient is receiving treatment. List of dedicated fax numbers for the Facilities:  FACILITY NAME UR FAX NUMBER   ADMISSION DENIALS (Administrative/Medical Necessity) [phone]   DISCHARGE SUPPORT TEAM (NETWORK) [phone]   PARENT CHILD HEALTH (Maternity/NICU/Pediatrics) [phone]   [facility] [phone]   [facility] [phone]   [facility] [phone]   [facility] [phone]   [facility] [phone]   [facility] [phone]   [facility] [phone]   [facility] [phone]   [facility]  [phone]   [facility] [phone]   [facility] [phone]   [facility] [phone]

## 2025-05-08 NOTE — ASSESSMENT & PLAN NOTE
S/P left TCAR, POD #1  Incision site negative for signs of infection.  Hemodynamically stable.  Ambulating on unit.  Continue aspirin 81 mg daily, atorvastatin 40 mg daily, clopidogrel 75 mg daily on discharge.  Importance of smoking cessation discussed.  Follow-up with PCP for resources.

## 2025-05-08 NOTE — UTILIZATION REVIEW
NOTIFICATION OF ADMISSION DISCHARGE   This is a Notification of Discharge from [facility]. Please be advised that this patient has been discharge from our facility. Below you will find the admission and discharge date and time including the patient’s disposition.   UTILIZATION REVIEW CONTACT:  Utilization Review Assistants  Network Utilization Review Department  Phone: [phone] x carefully listen to the prompts. All voicemails are confidential.  Email: [email or web address]     ADMISSION INFORMATION  PRESENTATION DATE: 5/7/2025  5:16 AM  OBERVATION ADMISSION DATE: N/A  INPATIENT ADMISSION DATE: 5/7/25 10:08 AM   DISCHARGE DATE: 5/8/2025 12:36 PM   DISPOSITION:Home/Self Care    Network Utilization Review Department  ATTENTION: Please call with any questions or concerns to [phone] and carefully listen to the prompts so that you are directed to the right person. All voicemails are confidential.   For Discharge needs, contact Care Management DC Support Team at [phone] opt. 2  Send all requests for admission clinical reviews, approved or denied determinations and any other requests to dedicated fax number below belonging to the campus where the patient is receiving treatment. List of dedicated fax numbers for the Facilities:  FACILITY NAME UR FAX NUMBER   ADMISSION DENIALS (Administrative/Medical Necessity) [phone]   DISCHARGE SUPPORT TEAM ([facility]) [phone]   PARENT CHILD HEALTH (Maternity/NICU/Pediatrics) [phone]   [facility] [phone]   [facility] [phone]   [facility] [phone]   [facility] [phone]   [facility] [phone]   [facility] [phone]   [facility] [phone]   [facility] [phone]   [facility] [phone]   [facility] [phone]   [facility] [phone]   [facility] [phone]

## 2025-05-08 NOTE — NURSING NOTE
Reviewed AVS with patient and his wife. Discussed post op instructions, follow up appointments and medications. Answered all questions. Patient discharged to home along with all belongings.

## 2025-05-12 ENCOUNTER — TELEPHONE (OUTPATIENT)
Dept: VASCULAR SURGERY | Facility: CLINIC | Age: 85
End: 2025-05-12

## 2025-05-12 NOTE — TELEPHONE ENCOUNTER
Vascular Nurse Navigator Post Op Phone Call    Post-Discharge phone call attempted to assess patient recovery after vascular surgery I left a message on answering machine. Will attempt to contact at later date.        Pt's chart was reviewed prior to call and leaving message.    Procedure: Left - ANGIOPLASTY ARTERY CAROTID W/ STENT     Date of Procedure: 5/7/25    Surgeon:   * Sven Barajas DO - Primary     * Ren Araiza MD - Fellow    Discharge Date: 5/8/25    Discharge Disposition: Home        Anticoagulation pt was discharged on post op?: Aspirin and Clopidogrel (Plavix)    Statin pt was discharged on post op?: Lipitor (atorvastatin)    Reminded pt of the following in message:    NEXT OFFICE VISIT SCHEDULED:  5/14/25 at 3:30 pm with RODRIGUEZ Arevalo at [facility]    To contact [facility] with any concerns.

## 2025-05-12 NOTE — TELEPHONE ENCOUNTER
Vascular Nurse Navigator Post Op Call    Procedure: Left - ANGIOPLASTY ARTERY CAROTID W/ STENT      Date of Procedure: 5/7/25     Surgeon:   * Sven Barajas DO - Primary     * Ren Araiza MD - Fellow     Discharge Date: 5/8/25     Discharge Disposition: Home    Change in Vision?: No    Change in Speech?: No    Weakness?: No    Uncontrolled Pain?: No    Hoarseness?: No    Trouble Swallowing?: No    Incision Concerns?: No    Anticoagulation pt was discharged on post op?: Aspirin and Clopidogrel (Plavix)    Statin pt was discharged on post op?: Lipitor (atorvastatin)    Bleeding?: No    Fever/chills?: No      Reviewed discharge instructions and incision care with patient.      NEXT OFFICE VISIT SCHEDULED:  5/14/25 at 3:30 pm with RODRIGUEZ Arevalo at [facility]     Transportation Available?: Yes      Any further questions/concerns?    Received call from patient's wife Shana who stated that patient is doing good since discharge, but he has not showered or washed incision.  She put patient on phone.  Discussed incision care with him - gently wash daily with soap and water, no rubbing or scrubbing, and do not soak as showering is ok.  Reviewed reasoning to cleanse incision daily.  He denies any redness, drainage, or open areas on neck incision or groin puncture.  He stated that when he turns his neck he feel pulling on incision area.  Advised him to limit this activity until it heals.  Reviewed discharge medications - Aspirin and Plavix.  All questions answered.  No concerns expressed at this time.

## 2025-05-13 NOTE — PROGRESS NOTES
"Name: Joe Aviles      : 1940      MRN: [id number]  Encounter Provider: RODRIGUEZ Streeter  Encounter Date: 2025   Encounter department: [facility] [place]  :  Assessment & Plan  Asymptomatic stenosis of left carotid artery  Patient reports that he is feeling well postoperatively.  He is denying any fever, chills, redness, warmth, or drainage from his left neck incision.  Patient reports that he has been washing his incision daily with soap and water and patting dry.  He has not required any as needed pain medication.  Patient is denying any new TIA/CVA symptoms (amaurosis fugax, slurred speech, facial droop, unilateral weakness, headache). He reports that he slept in an awkward position last night and that his left arm feels \"odd\" today.    Plan:  -Patient instructed to continue washing incision daily with soap and water, pat dry.  -Instructed patient to continue to avoid any heavy lifting or strenuous exercise until cleared at next OV.  -Repeat carotid duplex to be completed 6 weeks post-op.  -Continue medical optimization with ASA, clopidogrel, and atorvastatin.  -Patient instructed to notify the office of any redness, warmth, drainage, fever, or chills.  -Out of an abundance of caution, patient instructed to present to the emergency department for stroke workup related to left arm numbness.  -Follow up in the office with Dr. Barajas after carotid duplex.  Orders:    VAS carotid complete study; Future        History of Present Illness   HPI  Joe Aviles is a 84 y.o. male, current smoker, with PMH HTN, HLD, CVA, dementia, PAD, thrombocytopenia, and bilateral carotid artery stenosis.  Patient is presenting to the vascular surgery office for initial postop evaluation following TCAR 2025 (Karl).    Patient reports that he is feeling well postoperatively.  He is denying any fever, chills, redness, warmth, or drainage from his left neck incision.  Patient reports that " "he has been washing his incision daily with soap and water and patting dry.  He has not required any as needed pain medication.  Patient is denying any new TIA/CVA symptoms (amaurosis fugax, slurred speech, facial droop, unilateral weakness, headache). He reports that he slept in an awkward position last night and that his left arm feels \"odd\" today.    History obtained from: patient and patient's Significant Other    Review of Systems   Constitutional:  Negative for chills and fever.   HENT:  Negative for trouble swallowing.    Eyes:  Negative for visual disturbance.   Skin:  Negative for color change, pallor and wound.   Neurological:  Positive for numbness. Negative for facial asymmetry, speech difficulty, weakness and headaches.     Pertinent Medical History   Past Medical History:   Diagnosis Date    BPH (benign prostatic hyperplasia)     Colon polyp     Dementia (HCC)     Diverticulitis     Hypertension     Macular degeneration     poor vision    Shortness of breath     long term smoker    Stroke (HCC)     TIA x 7, LUE weakness/numbness    Wears dentures     Wears hearing aid in both ears          Medical History Reviewed by provider this encounter:     .  Medications Ordered Prior to Encounter[1]   Social History     Tobacco Use    Smoking status: Every Day     Types: Cigarettes    Smokeless tobacco: Never    Tobacco comments:     Down to 1/2 ppd as of 4/2025. Last smoked today 5/7   Vaping Use    Vaping status: Never Used   Substance and Sexual Activity    Alcohol use: Not Currently    Drug use: No    Sexual activity: Not on file        Objective   /72 (BP Location: Left arm, Patient Position: Sitting)   Pulse 75   Ht 5' 10\" (1.778 m)   Wt 68.9 kg (152 lb)   SpO2 98%   BMI 21.81 kg/m²      Physical Exam  Vitals and nursing note reviewed.   Constitutional:       General: He is not in acute distress.     Appearance: Normal appearance. He is well-developed.   HENT:      Head: Normocephalic and " atraumatic.     Eyes:      Conjunctiva/sclera: Conjunctivae normal.     Neck:      Vascular: No carotid bruit.       Cardiovascular:      Rate and Rhythm: Normal rate and regular rhythm.      Pulses:           Radial pulses are 2+ on the right side and 2+ on the left side.      Heart sounds: Normal heart sounds. No murmur heard.  Pulmonary:      Effort: Pulmonary effort is normal. No respiratory distress.      Breath sounds: Normal breath sounds.   Abdominal:      Palpations: Abdomen is soft.      Tenderness: There is no abdominal tenderness.     Musculoskeletal:         General: No swelling or tenderness.      Cervical back: Normal range of motion and neck supple. No tenderness.     Skin:     General: Skin is warm and dry.      Capillary Refill: Capillary refill takes less than 2 seconds.     Neurological:      General: No focal deficit present.      Mental Status: He is alert and oriented to person, place, and time.      Cranial Nerves: No cranial nerve deficit, dysarthria or facial asymmetry.      Sensory: Sensation is intact.      Motor: Motor function is intact. No weakness.     Psychiatric:         Mood and Affect: Mood normal.         Behavior: Behavior normal.         Administrative Statements   I have spent a total time of 20 minutes in caring for this patient on the day of the visit/encounter including Diagnostic results, Prognosis, Risks and benefits of tx options, Instructions for management, Patient and family education, Importance of tx compliance, Risk factor reductions, Impressions, Counseling / Coordination of care, Documenting in the medical record, Reviewing/placing orders in the medical record (including tests, medications, and/or procedures), and Obtaining or reviewing history  .         [1]   Current Outpatient Medications on File Prior to Visit   Medication Sig Dispense Refill    acetaminophen (TYLENOL) 325 mg tablet Take 2 tablets (650 mg total) by mouth every 6 (six) hours as needed for mild  pain      amLODIPine (NORVASC) 5 mg tablet Take 5 mg by mouth in the morning.      aspirin 81 mg chewable tablet Chew 1 tablet (81 mg total) daily 30 tablet 0    atorvastatin (LIPITOR) 40 mg tablet Take 1 tablet (40 mg total) by mouth every evening 30 tablet 3    clopidogrel (PLAVIX) 75 mg tablet Take 1 tablet (75 mg total) by mouth daily 90 tablet 2    donepezil (ARICEPT) 5 mg tablet Take 10 mg by mouth daily at bedtime      losartan (COZAAR) 100 MG tablet Take 25 mg by mouth in the morning.      Multiple Vitamins-Minerals (OCUVITE PO) Take 1 tablet by mouth in the morning.      propranolol (INDERAL) 20 mg tablet Take 20 mg by mouth in the morning and 20 mg in the evening and 20 mg before bedtime.      buPROPion (WELLBUTRIN XL) 150 mg 24 hr tablet Take 150 mg by mouth every morning (Patient not taking: Reported on 5/14/2025)      tamsulosin (FLOMAX) 0.4 mg Take 0.4 mg by mouth daily with dinner (Patient not taking: Reported on 3/3/2025)       No current facility-administered medications on file prior to visit.

## 2025-05-14 ENCOUNTER — OFFICE VISIT (OUTPATIENT)
Dept: VASCULAR SURGERY | Facility: CLINIC | Age: 85
End: 2025-05-14

## 2025-05-14 VITALS
HEART RATE: 75 BPM | WEIGHT: 152 LBS | OXYGEN SATURATION: 98 % | SYSTOLIC BLOOD PRESSURE: 146 MMHG | DIASTOLIC BLOOD PRESSURE: 72 MMHG | HEIGHT: 70 IN | BODY MASS INDEX: 21.76 KG/M2

## 2025-05-14 DIAGNOSIS — I65.22 ASYMPTOMATIC STENOSIS OF LEFT CAROTID ARTERY: Primary | ICD-10-CM

## 2025-05-14 PROCEDURE — 99024 POSTOP FOLLOW-UP VISIT: CPT

## 2025-05-14 NOTE — ASSESSMENT & PLAN NOTE
"Patient reports that he is feeling well postoperatively.  He is denying any fever, chills, redness, warmth, or drainage from his left neck incision.  Patient reports that he has been washing his incision daily with soap and water and patting dry.  He has not required any as needed pain medication.  Patient is denying any new TIA/CVA symptoms (amaurosis fugax, slurred speech, facial droop, unilateral weakness, headache). He reports that he slept in an awkward position last night and that his left arm feels \"odd\" today.    Plan:  -Patient instructed to continue washing incision daily with soap and water, pat dry.  -Instructed patient to continue to avoid any heavy lifting or strenuous exercise until cleared at next OV.  -Repeat carotid duplex to be completed 6 weeks post-op.  -Continue medical optimization with ASA, clopidogrel, and atorvastatin.  -Patient instructed to notify the office of any redness, warmth, drainage, fever, or chills.  -Out of an abundance of caution, patient instructed to present to the emergency department for stroke workup related to left arm numbness.  -Follow up in the office with Dr. Barajas after carotid duplex.  Orders:    VAS carotid complete study; Future    "

## 2025-05-15 PROBLEM — Z01.89 ENCOUNTER FOR GERIATRIC ASSESSMENT: Status: RESOLVED | Noted: 2024-05-17 | Resolved: 2025-05-15

## 2025-06-22 ENCOUNTER — APPOINTMENT (EMERGENCY)
Dept: CT IMAGING | Facility: HOSPITAL | Age: 85
End: 2025-06-22
Payer: COMMERCIAL

## 2025-06-22 ENCOUNTER — HOSPITAL ENCOUNTER (EMERGENCY)
Facility: HOSPITAL | Age: 85
Discharge: HOME | End: 2025-06-22
Attending: EMERGENCY MEDICINE | Admitting: EMERGENCY MEDICINE
Payer: COMMERCIAL

## 2025-06-22 VITALS
OXYGEN SATURATION: 96 % | HEART RATE: 76 BPM | BODY MASS INDEX: 22.41 KG/M2 | WEIGHT: 156.53 LBS | RESPIRATION RATE: 16 BRPM | DIASTOLIC BLOOD PRESSURE: 89 MMHG | SYSTOLIC BLOOD PRESSURE: 141 MMHG | HEIGHT: 70 IN | TEMPERATURE: 97.3 F

## 2025-06-22 DIAGNOSIS — R42 LIGHTHEADEDNESS: Primary | ICD-10-CM

## 2025-06-22 DIAGNOSIS — R06.00 DYSPNEA, UNSPECIFIED TYPE: ICD-10-CM

## 2025-06-22 DIAGNOSIS — N40.0 ENLARGED PROSTATE: ICD-10-CM

## 2025-06-22 DIAGNOSIS — N39.0 UTI (URINARY TRACT INFECTION): ICD-10-CM

## 2025-06-22 DIAGNOSIS — R91.1 PULMONARY NODULE: ICD-10-CM

## 2025-06-22 LAB
2HR DELTA HS TROPONIN: 0 NG/L
ALBUMIN SERPL BCG-MCNC: 4.2 G/DL (ref 3.5–5)
ALP SERPL-CCNC: 117 U/L (ref 34–104)
ALT SERPL W P-5'-P-CCNC: 18 U/L (ref 7–52)
ANION GAP SERPL CALCULATED.3IONS-SCNC: 6 MMOL/L (ref 4–13)
APTT PPP: 33 SECONDS (ref 23–34)
AST SERPL W P-5'-P-CCNC: 16 U/L (ref 13–39)
BACTERIA UR QL AUTO: ABNORMAL /HPF
BASOPHILS # BLD AUTO: 0.04 THOUSANDS/ÂΜL (ref 0–0.1)
BASOPHILS NFR BLD AUTO: 0 % (ref 0–1)
BILIRUB SERPL-MCNC: 0.94 MG/DL (ref 0.2–1)
BILIRUB UR QL STRIP: NEGATIVE
BNP SERPL-MCNC: 47 PG/ML (ref 0–100)
BUN SERPL-MCNC: 17 MG/DL (ref 5–25)
CALCIUM SERPL-MCNC: 9.3 MG/DL (ref 8.4–10.2)
CARDIAC TROPONIN I PNL SERPL HS: 5 NG/L (ref ?–50)
CARDIAC TROPONIN I PNL SERPL HS: 5 NG/L (ref ?–50)
CHLORIDE SERPL-SCNC: 109 MMOL/L (ref 96–108)
CLARITY UR: CLEAR
CO2 SERPL-SCNC: 29 MMOL/L (ref 21–32)
COLOR UR: YELLOW
CREAT SERPL-MCNC: 0.94 MG/DL (ref 0.6–1.3)
EOSINOPHIL # BLD AUTO: 0.16 THOUSAND/ÂΜL (ref 0–0.61)
EOSINOPHIL NFR BLD AUTO: 2 % (ref 0–6)
ERYTHROCYTE [DISTWIDTH] IN BLOOD BY AUTOMATED COUNT: 13.4 % (ref 11.6–15.1)
GFR SERPL CREATININE-BSD FRML MDRD: 75 ML/MIN/1.73SQ M
GLUCOSE SERPL-MCNC: 113 MG/DL (ref 65–140)
GLUCOSE UR STRIP-MCNC: NEGATIVE MG/DL
HCT VFR BLD AUTO: 44.6 % (ref 36.5–49.3)
HGB BLD-MCNC: 14.7 G/DL (ref 12–17)
HGB UR QL STRIP.AUTO: ABNORMAL
IMM GRANULOCYTES # BLD AUTO: 0.04 THOUSAND/UL (ref 0–0.2)
IMM GRANULOCYTES NFR BLD AUTO: 0 % (ref 0–2)
INR PPP: 0.98 (ref 0.85–1.19)
KETONES UR STRIP-MCNC: NEGATIVE MG/DL
LACTATE SERPL-SCNC: 0.9 MMOL/L (ref 0.5–2)
LEUKOCYTE ESTERASE UR QL STRIP: ABNORMAL
LYMPHOCYTES # BLD AUTO: 2.17 THOUSANDS/ÂΜL (ref 0.6–4.47)
LYMPHOCYTES NFR BLD AUTO: 22 % (ref 14–44)
MAGNESIUM SERPL-MCNC: 2.1 MG/DL (ref 1.9–2.7)
MCH RBC QN AUTO: 31.7 PG (ref 26.8–34.3)
MCHC RBC AUTO-ENTMCNC: 33 G/DL (ref 31.4–37.4)
MCV RBC AUTO: 96 FL (ref 82–98)
MONOCYTES # BLD AUTO: 0.55 THOUSAND/ÂΜL (ref 0.17–1.22)
MONOCYTES NFR BLD AUTO: 6 % (ref 4–12)
NEUTROPHILS # BLD AUTO: 6.72 THOUSANDS/ÂΜL (ref 1.85–7.62)
NEUTS SEG NFR BLD AUTO: 70 % (ref 43–75)
NITRITE UR QL STRIP: NEGATIVE
NON-SQ EPI CELLS URNS QL MICRO: ABNORMAL /HPF
NRBC BLD AUTO-RTO: 0 /100 WBCS
PH UR STRIP.AUTO: 7 [PH]
PLATELET # BLD AUTO: 147 THOUSANDS/UL (ref 149–390)
PMV BLD AUTO: 11.8 FL (ref 8.9–12.7)
POTASSIUM SERPL-SCNC: 3.7 MMOL/L (ref 3.5–5.3)
PROCALCITONIN SERPL-MCNC: <0.05 NG/ML
PROT SERPL-MCNC: 7 G/DL (ref 6.4–8.4)
PROT UR STRIP-MCNC: NEGATIVE MG/DL
PROTHROMBIN TIME: 13.4 SECONDS (ref 12.3–15)
RBC # BLD AUTO: 4.63 MILLION/UL (ref 3.88–5.62)
RBC #/AREA URNS AUTO: ABNORMAL /HPF
SODIUM SERPL-SCNC: 144 MMOL/L (ref 135–147)
SP GR UR STRIP.AUTO: 1.01 (ref 1–1.03)
UROBILINOGEN UR QL STRIP.AUTO: 0.2 E.U./DL
WBC # BLD AUTO: 9.68 THOUSAND/UL (ref 4.31–10.16)
WBC #/AREA URNS AUTO: ABNORMAL /HPF

## 2025-06-22 PROCEDURE — 87077 CULTURE AEROBIC IDENTIFY: CPT | Performed by: EMERGENCY MEDICINE

## 2025-06-22 PROCEDURE — 83880 ASSAY OF NATRIURETIC PEPTIDE: CPT | Performed by: EMERGENCY MEDICINE

## 2025-06-22 PROCEDURE — 94640 AIRWAY INHALATION TREATMENT: CPT

## 2025-06-22 PROCEDURE — 84484 ASSAY OF TROPONIN QUANT: CPT | Performed by: EMERGENCY MEDICINE

## 2025-06-22 PROCEDURE — 85610 PROTHROMBIN TIME: CPT | Performed by: EMERGENCY MEDICINE

## 2025-06-22 PROCEDURE — 71275 CT ANGIOGRAPHY CHEST: CPT

## 2025-06-22 PROCEDURE — 87186 SC STD MICRODIL/AGAR DIL: CPT | Performed by: EMERGENCY MEDICINE

## 2025-06-22 PROCEDURE — 85025 COMPLETE CBC W/AUTO DIFF WBC: CPT | Performed by: EMERGENCY MEDICINE

## 2025-06-22 PROCEDURE — 83735 ASSAY OF MAGNESIUM: CPT | Performed by: EMERGENCY MEDICINE

## 2025-06-22 PROCEDURE — 36415 COLL VENOUS BLD VENIPUNCTURE: CPT | Performed by: EMERGENCY MEDICINE

## 2025-06-22 PROCEDURE — 87086 URINE CULTURE/COLONY COUNT: CPT | Performed by: EMERGENCY MEDICINE

## 2025-06-22 PROCEDURE — 80053 COMPREHEN METABOLIC PANEL: CPT | Performed by: EMERGENCY MEDICINE

## 2025-06-22 PROCEDURE — 99285 EMERGENCY DEPT VISIT HI MDM: CPT

## 2025-06-22 PROCEDURE — 84145 PROCALCITONIN (PCT): CPT | Performed by: EMERGENCY MEDICINE

## 2025-06-22 PROCEDURE — 74177 CT ABD & PELVIS W/CONTRAST: CPT

## 2025-06-22 PROCEDURE — 83605 ASSAY OF LACTIC ACID: CPT | Performed by: EMERGENCY MEDICINE

## 2025-06-22 PROCEDURE — 85730 THROMBOPLASTIN TIME PARTIAL: CPT | Performed by: EMERGENCY MEDICINE

## 2025-06-22 PROCEDURE — 93005 ELECTROCARDIOGRAM TRACING: CPT

## 2025-06-22 PROCEDURE — 99285 EMERGENCY DEPT VISIT HI MDM: CPT | Performed by: EMERGENCY MEDICINE

## 2025-06-22 PROCEDURE — 81001 URINALYSIS AUTO W/SCOPE: CPT | Performed by: EMERGENCY MEDICINE

## 2025-06-22 RX ORDER — IPRATROPIUM BROMIDE AND ALBUTEROL SULFATE 2.5; .5 MG/3ML; MG/3ML
3 SOLUTION RESPIRATORY (INHALATION) ONCE
Status: COMPLETED | OUTPATIENT
Start: 2025-06-22 | End: 2025-06-22

## 2025-06-22 RX ORDER — ALBUTEROL SULFATE 90 UG/1
2 INHALANT RESPIRATORY (INHALATION) EVERY 6 HOURS PRN
Qty: 6.7 G | Refills: 0 | Status: SHIPPED | OUTPATIENT
Start: 2025-06-22

## 2025-06-22 RX ADMIN — IPRATROPIUM BROMIDE AND ALBUTEROL SULFATE 3 ML: .5; 3 SOLUTION RESPIRATORY (INHALATION) at 13:53

## 2025-06-22 RX ADMIN — SODIUM CHLORIDE 500 ML: 0.9 INJECTION, SOLUTION INTRAVENOUS at 13:52

## 2025-06-22 RX ADMIN — IOHEXOL 100 ML: 350 INJECTION, SOLUTION INTRAVENOUS at 14:24

## 2025-06-22 NOTE — ED PROVIDER NOTES
Time reflects when diagnosis was documented in both MDM as applicable and the Disposition within this note       Time User Action Codes Description Comment    6/22/2025  4:33 PM Jude Rice [R42] Lightheadedness     6/22/2025  4:33 PM Jude Rice [R06.00] Dyspnea, unspecified type     6/22/2025  4:33 PM Jude Rice [R91.1] Pulmonary nodule     6/22/2025  4:36 PM Jude Rice [N40.0] Enlarged prostate           ED Disposition       ED Disposition   Discharge    Condition   Stable    Date/Time   Sun Jun 22, 2025  4:33 PM    Comment   Joe Aviles discharge to home/self care.                   Assessment & Plan       Medical Decision Making  Patient was seen and evaluated for his presentation as outlined.  At risk for acute coronary syndrome, electrolyte disturbance, infection, pulmonary embolism, acute thoracic process, intra-abdominal process, medication effect, other.  Due to weight loss, consideration also given to neoplastic disease.  Workup as shown.  No concerning laboratory abnormalities.  Small amount of white blood cells noted in the urine, no bacteria.  Sent for culture.  Renal function normal.  Slight hyper chloremia and elevated alkaline phosphatase of doubtful clinical significance.  Incidental 6 mm pulmonary nodule noted.  No other acute findings identified.  Feels better after receiving IV fluids.  Discussed possible medication effect.  Patient on propranolol, states takes it twice a day, not 3 times a day as prescribed.  However, did take only once a day in the past.  Suggested decreasing propranolol dosing to once a day, monitoring blood pressure.  Advised follow-up with primary care for recheck of symptoms.  Enlarged prostate also noted on CT scan.  Advise follow-up with primary care and urology.  Patient is otherwise stable for discharge from the emergency department.  Strict return precautions reviewed.  All questions answered.    Amount and/or Complexity of Data  Reviewed  Labs: ordered.  Radiology: ordered.  ECG/medicine tests: ordered and independent interpretation performed.     Details: EKG per my interpretation demonstrates sinus bradycardia at a ventricular rate of 57 bpm.  Left axis deviation, normal intervals.  No acute ST elevations or T wave inversions.  Compared with prior EKG from January 31, 2025, ventricular rate has decreased by 12 bpm.    Risk  Prescription drug management.             Medications   sodium chloride 0.9 % bolus 500 mL (0 mL Intravenous Stopped 6/22/25 1419)   ipratropium-albuterol (DUO-NEB) 0.5-2.5 mg/3 mL inhalation solution 3 mL (3 mL Nebulization Given 6/22/25 1353)   iohexol (OMNIPAQUE) 350 MG/ML injection (MULTI-DOSE) 100 mL (100 mL Intravenous Given 6/22/25 1424)       ED Risk Strat Scores                    No data recorded        SBIRT 20yo+      Flowsheet Row Most Recent Value   Initial Alcohol Screen: US AUDIT-C     1. How often do you have a drink containing alcohol? 0 Filed at: 06/22/2025 6736   2. How many drinks containing alcohol do you have on a typical day you are drinking?  0 Filed at: 06/22/2025 2053   3b. FEMALE Any Age, or MALE 65+: How often do you have 4 or more drinks on one occassion? 0 Filed at: 06/22/2025 1556   Audit-C Score 0 Filed at: 06/22/2025 1554   ZEINA: How many times in the past year have you...    Used an illegal drug or used a prescription medication for non-medical reasons? Never Filed at: 06/22/2025 6243                            History of Present Illness       Chief Complaint   Patient presents with    Dizziness     Pt arrives from home with c/o dizziness for weeks. Daughter reports pt had bp of 90/50 at home. Pt also c/o SOB.        Past Medical History[1]   Past Surgical History[2]   Family History[3]   Social History[4]   E-Cigarette/Vaping    E-Cigarette Use Never User       E-Cigarette/Vaping Substances    Nicotine No     THC No     CBD No     Flavoring No       I have reviewed and agree with the  history as documented.     Patient presents to the emergency department brought in by his wife for evaluation of lightheadedness, dizziness, hypotension.  Patient states that this morning after his morning routine, he sat down and had an episode of lightheadedness where he felt like he might pass out.  Also had mild headache that lasted a few minutes.  Dizziness and lightheadedness lasted for a couple of hours.  Wife took his blood pressure at that time, initially 98 systolic, repeat pressure was in the 90s systolic.  Improved after arrival here, but fluctuating.  Initial blood pressure was in the 160s systolic, repeat at time of my evaluation in the 120 systolic.  Still feels lightheaded if he were to sit up.  No headache at present.  No visual changes, numbness, tingling, or weakness.  History of prior TIA and strokes.  No new focal deficit.  Has been having some shortness of breath over the past month.  Had home health nurse checked him, stated he might have COPD.  Has an extensive smoking history.  Does not use any inhalers at home.  Wife also reports a 20 pound unintentional weight loss over the last 4 months.  No other complaints, modifying factors, or associated symptoms.        Review of Systems   All other systems reviewed and are negative.          Objective       ED Triage Vitals [06/22/25 1307]   Temperature Pulse Blood Pressure Respirations SpO2 Patient Position - Orthostatic VS   (!) 97.3 °F (36.3 °C) 60 161/85 20 98 % Sitting      Temp Source Heart Rate Source BP Location FiO2 (%) Pain Score    Temporal Monitor Right arm -- No Pain      Vitals      Date and Time Temp Pulse SpO2 Resp BP Pain Score FACES Pain Rating User   06/22/25 1445 -- 76 98 % 18 144/98 -- -- NB   06/22/25 1430 -- 60 97 % 17 142/68 -- -- NB   06/22/25 1400 -- 58 98 % 16 142/68 -- -- NB   06/22/25 1330 -- 61 97 % 18 129/71 -- -- NB   06/22/25 1307 97.3 °F (36.3 °C) 60 98 % 20 161/85 No Pain -- MD            Physical Exam  Vitals and  nursing note reviewed.   Constitutional:       General: He is not in acute distress.     Appearance: Normal appearance. He is well-developed. He is not ill-appearing.   HENT:      Head: Normocephalic and atraumatic.      Nose: Nose normal.      Mouth/Throat:      Mouth: Mucous membranes are moist.     Eyes:      Extraocular Movements: Extraocular movements intact.      Conjunctiva/sclera: Conjunctivae normal.      Pupils: Pupils are equal, round, and reactive to light.       Cardiovascular:      Rate and Rhythm: Normal rate and regular rhythm.      Pulses: Normal pulses.      Heart sounds: No murmur heard.     No friction rub. No gallop.   Pulmonary:      Effort: Pulmonary effort is normal. No respiratory distress.      Breath sounds: Normal breath sounds. No wheezing, rhonchi or rales.   Abdominal:      General: Abdomen is flat. There is no distension.      Palpations: Abdomen is soft.      Tenderness: There is no abdominal tenderness. There is no guarding or rebound.     Musculoskeletal:         General: No swelling. Normal range of motion.      Cervical back: Neck supple.      Right lower leg: No edema.      Left lower leg: No edema.     Skin:     General: Skin is warm and dry.      Capillary Refill: Capillary refill takes less than 2 seconds.     Neurological:      General: No focal deficit present.      Mental Status: He is alert and oriented to person, place, and time.     Psychiatric:         Mood and Affect: Mood normal.         Behavior: Behavior normal.         Results Reviewed       Procedure Component Value Units Date/Time    Urine culture [[id number]] Collected: 06/22/25 1619    Lab Status: In process Specimen: Urine, Clean Catch Updated: 06/22/25 1622    HS Troponin I 2hr [[id number]]  (Normal) Collected: 06/22/25 1540    Lab Status: Final result Specimen: Blood from Arm, Left Updated: 06/22/25 1606     hs TnI 2hr 5 ng/L      Delta 2hr hsTnI 0 ng/L     Urine Microscopic [[id number]]  (Abnormal) Collected:  06/22/25 1442    Lab Status: Final result Specimen: Urine, Clean Catch Updated: 06/22/25 1512     RBC, UA 0-1 /hpf      WBC, UA 4-10 /hpf      Epithelial Cells Occasional /hpf      Bacteria, UA Occasional /hpf     UA w Reflex to Microscopic w Reflex to Culture [[id number]]  (Abnormal) Collected: 06/22/25 1442    Lab Status: Final result Specimen: Urine, Clean Catch Updated: 06/22/25 1459     Color, UA Yellow     Clarity, UA Clear     Specific Gravity, UA 1.015     pH, UA 7.0     Leukocytes, UA Trace     Nitrite, UA Negative     Protein, UA Negative mg/dl      Glucose, UA Negative mg/dl      Ketones, UA Negative mg/dl      Urobilinogen, UA 0.2 E.U./dl      Bilirubin, UA Negative     Occult Blood, UA Trace-Intact    HS Troponin 0hr (reflex protocol) [[id number]]  (Normal) Collected: 06/22/25 1350    Lab Status: Final result Specimen: Blood from Arm, Left Updated: 06/22/25 1428     hs TnI 0hr 5 ng/L     Procalcitonin [[id number]]  (Normal) Collected: 06/22/25 1350    Lab Status: Final result Specimen: Blood from Arm, Left Updated: 06/22/25 1428     Procalcitonin <0.05 ng/ml     B-Type Natriuretic Peptide(BNP) [[id number]]  (Normal) Collected: 06/22/25 1350    Lab Status: Final result Specimen: Blood from Arm, Left Updated: 06/22/25 1424     BNP 47 pg/mL     Protime-INR [[id number]]  (Normal) Collected: 06/22/25 1350    Lab Status: Final result Specimen: Blood from Arm, Left Updated: 06/22/25 1417     Protime 13.4 seconds      INR 0.98    Narrative:      INR Therapeutic Range    Indication                                             INR Range      Atrial Fibrillation                                               2.0-3.0  Hypercoagulable State                                    2.0.2.3  Left Ventricular Asist Device                            2.0-3.0  Mechanical Heart Valve                                  -    Aortic(with afib, MI, embolism, HF, LA enlargement,    and/or coagulopathy)                                      2.0-3.0 (2.5-3.5)     Mitral                                                             2.5-3.5  Prosthetic/Bioprosthetic Heart Valve               2.0-3.0  Venous thromboembolism (VTE: VT, PE        2.0-3.0    APTT [[id number]]  (Normal) Collected: 06/22/25 1350    Lab Status: Final result Specimen: Blood from Arm, Left Updated: 06/22/25 1417     PTT 33 seconds     Comprehensive metabolic panel [[id number]]  (Abnormal) Collected: 06/22/25 1350    Lab Status: Final result Specimen: Blood from Arm, Left Updated: 06/22/25 1415     Sodium 144 mmol/L      Potassium 3.7 mmol/L      Chloride 109 mmol/L      CO2 29 mmol/L      ANION GAP 6 mmol/L      BUN 17 mg/dL      Creatinine 0.94 mg/dL      Glucose 113 mg/dL      Calcium 9.3 mg/dL      AST 16 U/L      ALT 18 U/L      Alkaline Phosphatase 117 U/L      Total Protein 7.0 g/dL      Albumin 4.2 g/dL      Total Bilirubin 0.94 mg/dL      eGFR 75 ml/min/1.73sq m     Narrative:      National Kidney Disease Foundation guidelines for Chronic Kidney Disease (CKD):     Stage 1 with normal or high GFR (GFR > 90 mL/min/1.73 square meters)    Stage 2 Mild CKD (GFR = 60-89 mL/min/1.73 square meters)    Stage 3A Moderate CKD (GFR = 45-59 mL/min/1.73 square meters)    Stage 3B Moderate CKD (GFR = 30-44 mL/min/1.73 square meters)    Stage 4 Severe CKD (GFR = 15-29 mL/min/1.73 square meters)    Stage 5 End Stage CKD (GFR <15 mL/min/1.73 square meters)  Note: GFR calculation is accurate only with a steady state creatinine    Lactic acid, plasma (w/reflex if result > 2.0) [[id number]]  (Normal) Collected: 06/22/25 1350    Lab Status: Final result Specimen: Blood from Arm, Left Updated: 06/22/25 1415     LACTIC ACID 0.9 mmol/L     Narrative:      Result may be elevated if tourniquet was used during collection.    Magnesium [[id number]]  (Normal) Collected: 06/22/25 1350    Lab Status: Final result Specimen: Blood from Arm, Left Updated: 06/22/25 1415     Magnesium 2.1 mg/dL     CBC and  differential [[id number]]  (Abnormal) Collected: 06/22/25 1350    Lab Status: Final result Specimen: Blood from Arm, Left Updated: 06/22/25 1400     WBC 9.68 Thousand/uL      RBC 4.63 Million/uL      Hemoglobin 14.7 g/dL      Hematocrit 44.6 %      MCV 96 fL      MCH 31.7 pg      MCHC 33.0 g/dL      RDW 13.4 %      MPV 11.8 fL      Platelets 147 Thousands/uL      nRBC 0 /100 WBCs      Segmented % 70 %      Immature Grans % 0 %      Lymphocytes % 22 %      Monocytes % 6 %      Eosinophils Relative 2 %      Basophils Relative 0 %      Absolute Neutrophils 6.72 Thousands/µL      Absolute Immature Grans 0.04 Thousand/uL      Absolute Lymphocytes 2.17 Thousands/µL      Absolute Monocytes 0.55 Thousand/µL      Eosinophils Absolute 0.16 Thousand/µL      Basophils Absolute 0.04 Thousands/µL             CT pe study w abdomen pelvis w contrast   Final Interpretation by Jude Martinez MD (06/22 0002)      No acute findings in the chest, abdomen or pelvis. No pulmonary embolus.      6 mm left lower lobe nodule. Based on current Fleischner Society 2017 Guidelines on incidental pulmonary nodule, follow-up non-contrast CT is recommended at 6-12 months from the initial examination and, if stable at that time, an additional follow-up is    recommended for 18-24 months from the initial examination.      Colonic diverticulosis without diverticulitis.      Prostatomegaly.      The study was marked in EPIC for immediate notification and follow-up.            Computerized Assisted Algorithm (CAA) may have aided analysis of applicable images.      Workstation performed: [id number]             Procedures    ED Medication and Procedure Management   Prior to Admission Medications   Prescriptions Last Dose Informant Patient Reported? Taking?   Multiple Vitamins-Minerals (OCUVITE PO)  Spouse/Significant Other Yes No   Sig: Take 1 tablet by mouth in the morning.   acetaminophen (TYLENOL) 325 mg tablet  Spouse/Significant Other No No   Sig:  Take 2 tablets (650 mg total) by mouth every 6 (six) hours as needed for mild pain   amLODIPine (NORVASC) 5 mg tablet  Spouse/Significant Other Yes No   Sig: Take 5 mg by mouth in the morning.   aspirin 81 mg chewable tablet  Spouse/Significant Other No No   Sig: Chew 1 tablet (81 mg total) daily   atorvastatin (LIPITOR) 40 mg tablet  Spouse/Significant Other No No   Sig: Take 1 tablet (40 mg total) by mouth every evening   buPROPion (WELLBUTRIN XL) 150 mg 24 hr tablet   Yes No   Sig: Take 150 mg by mouth every morning   Patient not taking: Reported on 5/14/2025   clopidogrel (PLAVIX) 75 mg tablet  Spouse/Significant Other No No   Sig: Take 1 tablet (75 mg total) by mouth daily   donepezil (ARICEPT) 5 mg tablet  Spouse/Significant Other Yes No   Sig: Take 10 mg by mouth daily at bedtime   losartan (COZAAR) 100 MG tablet   Yes No   Sig: Take 25 mg by mouth in the morning.   propranolol (INDERAL) 20 mg tablet  Spouse/Significant Other Yes No   Sig: Take 20 mg by mouth in the morning and 20 mg in the evening and 20 mg before bedtime.   tamsulosin (FLOMAX) 0.4 mg  Spouse/Significant Other Yes No   Sig: Take 0.4 mg by mouth daily with dinner   Patient not taking: Reported on 3/3/2025      Facility-Administered Medications: None     Patient's Medications   Discharge Prescriptions    ALBUTEROL (PROVENTIL HFA) 90 MCG/ACT INHALER    Inhale 2 puffs every 6 (six) hours as needed (Shortness of breath, cough, or wheeze.)       Start Date: 6/22/2025 End Date: --       Order Dose: 2 puffs       Quantity: 6.7 g    Refills: 0     No discharge procedures on file.  ED SEPSIS DOCUMENTATION   Time reflects when diagnosis was documented in both MDM as applicable and the Disposition within this note       Time User Action Codes Description Comment    6/22/2025  4:33 PM Jude Rice [R42] Lightheadedness     6/22/2025  4:33 PM Jude Rice [R06.00] Dyspnea, unspecified type     6/22/2025  4:33 PM Jude Rice [R91.1]  Pulmonary nodule     6/22/2025  4:36 PM Jude Rice Add [N40.0] Enlarged prostate                      [1]   Past Medical History:  Diagnosis Date    BPH (benign prostatic hyperplasia)     Colon polyp     Dementia (HCC)     Diverticulitis     Hypertension     Macular degeneration     poor vision    Shortness of breath     long term smoker    Stroke (HCC)     TIA x 7, LUE weakness/numbness    Wears dentures     Wears hearing aid in both ears    [2]   Past Surgical History:  Procedure Laterality Date    COLONOSCOPY      CORONARY ANGIOPLASTY WITH STENT PLACEMENT      CT CYSTOGRAM  1/14/2022    CYSTOSCOPY      IR CAROTID STENT  5/7/2025    IA TCAT IV STENT CRV CRTD ART EMBOLIC PROTECJ Right 05/22/2024    Procedure: ANGIOPLASTY ARTERY CAROTID W/ STENT;  Surgeon: Sven Barajas DO;  Location: [facility];  Service: Vascular    IA TCAT IV STENT CRV CRTD ART EMBOLIC PROTECJ Left 5/7/2025    Procedure: ANGIOPLASTY ARTERY CAROTID W/ STENT;  Surgeon: Sven Barajas DO;  Location: [facility];  Service: Vascular    PROSTATE SURGERY      TONSILLECTOMY      VASECTOMY     [3]   Family History  Problem Relation Name Age of Onset    Brain cancer Mother      Cancer Brother          Bladder    Brain cancer Child Son    [4]   Social History  Tobacco Use    Smoking status: Every Day     Types: Cigarettes    Smokeless tobacco: Never    Tobacco comments:     Down to 1/2 ppd as of 4/2025. Last smoked today 5/7   Vaping Use    Vaping status: Never Used   Substance Use Topics    Alcohol use: Not Currently    Drug use: No        Jude Rice MD  06/22/25 8022

## 2025-06-22 NOTE — DISCHARGE INSTRUCTIONS
Consider decreasing propranolol to once daily dosing.  Stay well-hydrated.  Follow-up with primary care and urology.  Return to the ED for any new or worsening symptoms or concerns.

## 2025-06-23 LAB
ATRIAL RATE: 57 BPM
P AXIS: 47 DEGREES
PR INTERVAL: 154 MS
QRS AXIS: -40 DEGREES
QRSD INTERVAL: 102 MS
QT INTERVAL: 416 MS
QTC INTERVAL: 404 MS
T WAVE AXIS: 0 DEGREES
VENTRICULAR RATE: 57 BPM

## 2025-06-24 LAB — BACTERIA UR CULT: ABNORMAL

## 2025-06-25 RX ORDER — CEPHALEXIN 500 MG/1
500 CAPSULE ORAL EVERY 6 HOURS SCHEDULED
Qty: 28 CAPSULE | Refills: 0 | Status: SHIPPED | OUTPATIENT
Start: 2025-06-25 | End: 2025-07-02

## 2025-07-03 ENCOUNTER — HOSPITAL ENCOUNTER (OUTPATIENT)
Facility: CLINIC | Age: 85
Discharge: HOME | End: 2025-07-03
Payer: COMMERCIAL

## 2025-07-03 DIAGNOSIS — I65.22 ASYMPTOMATIC STENOSIS OF LEFT CAROTID ARTERY: ICD-10-CM

## 2025-07-03 PROCEDURE — 93880 EXTRACRANIAL BILAT STUDY: CPT | Performed by: SURGERY

## 2025-07-03 PROCEDURE — 93880 EXTRACRANIAL BILAT STUDY: CPT

## 2025-07-29 ENCOUNTER — OFFICE VISIT (OUTPATIENT)
Dept: VASCULAR SURGERY | Facility: CLINIC | Age: 85
End: 2025-07-29

## 2025-07-29 VITALS
BODY MASS INDEX: 21.52 KG/M2 | WEIGHT: 150 LBS | OXYGEN SATURATION: 97 % | HEART RATE: 63 BPM | DIASTOLIC BLOOD PRESSURE: 80 MMHG | SYSTOLIC BLOOD PRESSURE: 150 MMHG

## 2025-07-29 DIAGNOSIS — I65.23 BILATERAL CAROTID ARTERY STENOSIS: Primary | ICD-10-CM

## 2025-07-29 DIAGNOSIS — I65.22 ASYMPTOMATIC STENOSIS OF LEFT CAROTID ARTERY: ICD-10-CM

## 2025-07-29 DIAGNOSIS — I72.3 ANEURYSM OF LEFT COMMON ILIAC ARTERY (HCC): ICD-10-CM

## 2025-07-29 DIAGNOSIS — I65.21 SYMPTOMATIC STENOSIS OF RIGHT CAROTID ARTERY: ICD-10-CM

## 2025-07-29 PROCEDURE — 99024 POSTOP FOLLOW-UP VISIT: CPT | Performed by: SURGERY

## 2025-07-29 RX ORDER — ATORVASTATIN CALCIUM 10 MG/1
10 TABLET, FILM COATED ORAL EVERY EVENING
COMMUNITY
Start: 2025-06-02

## 2025-07-29 RX ORDER — DONEPEZIL HYDROCHLORIDE 10 MG/1
10 TABLET, FILM COATED ORAL
COMMUNITY
Start: 2025-07-28

## 2025-08-11 ENCOUNTER — TELEPHONE (OUTPATIENT)
Dept: CARDIOLOGY CLINIC | Facility: CLINIC | Age: 85
End: 2025-08-11

## 2025-08-17 ENCOUNTER — APPOINTMENT (EMERGENCY)
Dept: RADIOLOGY | Facility: HOSPITAL | Age: 85
End: 2025-08-17
Payer: COMMERCIAL

## 2025-08-17 ENCOUNTER — HOSPITAL ENCOUNTER (EMERGENCY)
Facility: HOSPITAL | Age: 85
Discharge: HOME | End: 2025-08-17
Attending: EMERGENCY MEDICINE | Admitting: EMERGENCY MEDICINE
Payer: COMMERCIAL

## 2025-08-17 VITALS
HEART RATE: 83 BPM | BODY MASS INDEX: 23.68 KG/M2 | OXYGEN SATURATION: 97 % | TEMPERATURE: 98.4 F | WEIGHT: 165 LBS | DIASTOLIC BLOOD PRESSURE: 99 MMHG | SYSTOLIC BLOOD PRESSURE: 175 MMHG | RESPIRATION RATE: 18 BRPM

## 2025-08-17 DIAGNOSIS — J06.9 VIRAL URI WITH COUGH: Primary | ICD-10-CM

## 2025-08-17 LAB
FLUAV AG UPPER RESP QL IA.RAPID: NEGATIVE
FLUBV AG UPPER RESP QL IA.RAPID: NEGATIVE
SARS-COV+SARS-COV-2 AG RESP QL IA.RAPID: NEGATIVE

## 2025-08-17 PROCEDURE — 99284 EMERGENCY DEPT VISIT MOD MDM: CPT

## 2025-08-17 PROCEDURE — 94640 AIRWAY INHALATION TREATMENT: CPT

## 2025-08-17 PROCEDURE — 87804 INFLUENZA ASSAY W/OPTIC: CPT | Performed by: EMERGENCY MEDICINE

## 2025-08-17 PROCEDURE — 99284 EMERGENCY DEPT VISIT MOD MDM: CPT | Performed by: EMERGENCY MEDICINE

## 2025-08-17 PROCEDURE — 87811 SARS-COV-2 COVID19 W/OPTIC: CPT | Performed by: EMERGENCY MEDICINE

## 2025-08-17 PROCEDURE — 71046 X-RAY EXAM CHEST 2 VIEWS: CPT

## 2025-08-17 RX ORDER — BENZONATATE 100 MG/1
100 CAPSULE ORAL ONCE
Status: COMPLETED | OUTPATIENT
Start: 2025-08-17 | End: 2025-08-17

## 2025-08-17 RX ORDER — BENZONATATE 100 MG/1
100 CAPSULE ORAL EVERY 8 HOURS
Qty: 21 CAPSULE | Refills: 0 | Status: SHIPPED | OUTPATIENT
Start: 2025-08-17

## 2025-08-17 RX ORDER — PREDNISONE 20 MG/1
40 TABLET ORAL DAILY
Qty: 10 TABLET | Refills: 0 | Status: SHIPPED | OUTPATIENT
Start: 2025-08-17 | End: 2025-08-22

## 2025-08-17 RX ORDER — IPRATROPIUM BROMIDE AND ALBUTEROL SULFATE 2.5; .5 MG/3ML; MG/3ML
3 SOLUTION RESPIRATORY (INHALATION) ONCE
Status: COMPLETED | OUTPATIENT
Start: 2025-08-17 | End: 2025-08-17

## 2025-08-17 RX ADMIN — IPRATROPIUM BROMIDE AND ALBUTEROL SULFATE 3 ML: .5; 3 SOLUTION RESPIRATORY (INHALATION) at 18:17

## 2025-08-17 RX ADMIN — BENZONATATE 100 MG: 100 CAPSULE ORAL at 18:17

## (undated) DEVICE — THYROID SHEET: Brand: CONVERTORS

## (undated) DEVICE — VESSEL CANNULA

## (undated) DEVICE — UMBILICAL TAPE: Brand: DEROYAL

## (undated) DEVICE — EXOFIN PRECISION PEN HIGH VISCOSITY TOPICAL SKIN ADHESIVE: Brand: EXOFIN PRECISION PEN, 1G

## (undated) DEVICE — PETRI DISH STERILE

## (undated) DEVICE — PROBE COVER: Brand: STERILE PROBE COVER

## (undated) DEVICE — GLOVE INDICATOR PI UNDERGLOVE SZ 8 BLUE

## (undated) DEVICE — SET EXT 20IN IV LS SLIDE CLAMP LF NON DEHP

## (undated) DEVICE — REM POLYHESIVE ADULT PATIENT RETURN ELECTRODE: Brand: VALLEYLAB

## (undated) DEVICE — RADIFOCUS TORQUE DEVICE MULTI-TORQUE VISE: Brand: RADIFOCUS TORQUE DEVICE

## (undated) DEVICE — PVC URETHRAL CATHETER: Brand: DOVER

## (undated) DEVICE — DECANTER: Brand: UNBRANDED

## (undated) DEVICE — SUT SILK 2-0 18 IN A185H

## (undated) DEVICE — PACK CUSTOM CARDIOVASCULAR

## (undated) DEVICE — SUT PROLENE 5-0 BV-1 24 IN 9702H

## (undated) DEVICE — FLUID MANAGEMENT KIT - IR

## (undated) DEVICE — SUT MONOCRYL 4-0 PS-2 18 IN Y496G

## (undated) DEVICE — INSTRUMENT POUCH: Brand: CONVERTORS

## (undated) DEVICE — PRESTO™ INFLATION DEVICE: Brand: PRESTO

## (undated) DEVICE — SUT SILK 0 CT-1 30 IN 424H

## (undated) DEVICE — Device

## (undated) DEVICE — SURGICEL FIBRILLAR 1 X 2

## (undated) DEVICE — ANGLED-TIP ARTERIAL SHEATH CONFIGURATION: Brand: ENROUTE TRANSCAROTID NEUROPROTECTION SYSTEM

## (undated) DEVICE — AVIATOR PLUS .014 5.0X30 142CM: Brand: AVIATOR

## (undated) DEVICE — SUT MONOCRYL 3-0 SH 27 IN Y416H

## (undated) DEVICE — 40529 DERMAPROX PAD 11'' X 15'' X 1'': Brand: 40529 DERMAPROX PAD 11'' X 15'' X 1''

## (undated) DEVICE — 2000CC GUARDIAN II: Brand: GUARDIAN

## (undated) DEVICE — TELFA NON-ADHERENT ABSORBENT DRESSING: Brand: TELFA

## (undated) DEVICE — 3M™ TEGADERM™ TRANSPARENT FILM DRESSING FRAME STYLE, 1624W, 2-3/8 IN X 2-3/4 IN (6 CM X 7 CM), 100/CT 4CT/CASE: Brand: 3M™ TEGADERM™

## (undated) DEVICE — GLOVE SRG BIOGEL 7.5

## (undated) DEVICE — GAUZE SPONGES,16 PLY: Brand: CURITY

## (undated) DEVICE — BETHL CAROTID ENDARTERECTOMY: Brand: CARDINAL HEALTH

## (undated) DEVICE — INTRODUCER KIT MICO 4FR 21G X 7CM

## (undated) DEVICE — SUT SILK 2-0 30 IN A305H

## (undated) DEVICE — SUT POLYESTER TAPE D-G 8618-00

## (undated) DEVICE — MICROPUNCTURE 501

## (undated) DEVICE — 3M™ STERI-STRIP™ REINFORCED ADHESIVE SKIN CLOSURES, R1547, 1/2 IN X 4 IN (12 MM X 100 MM), 6 STRIPS/ENVELOPE: Brand: 3M™ STERI-STRIP™

## (undated) DEVICE — DRESSING MEPORE FILM ADHESIVE 4 X 5IN

## (undated) DEVICE — 3M™ IOBAN™ 2 ANTIMICROBIAL INCISE DRAPE 6650EZ: Brand: IOBAN™ 2

## (undated) DEVICE — SLIM BODY SKIN STAPLER: Brand: APPOSE ULC

## (undated) DEVICE — SUT MONOCRYL 4-0 PS-2 27 IN Y426H

## (undated) DEVICE — 3M™ IOBAN™ 2 ANTIMICROBIAL INCISE DRAPE 6640EZ: Brand: IOBAN™ 2

## (undated) DEVICE — GUIDEWIRE THRUWAY 0.014 IN 130CM LONG STR

## (undated) DEVICE — NEEDLE 25G X 1 1/2

## (undated) DEVICE — PAD GROUNDING DUAL ADULT

## (undated) DEVICE — SNAP KOVER: Brand: UNBRANDED